# Patient Record
Sex: FEMALE | Race: WHITE | Employment: OTHER
[De-identification: names, ages, dates, MRNs, and addresses within clinical notes are randomized per-mention and may not be internally consistent; named-entity substitution may affect disease eponyms.]

---

## 2017-01-09 DIAGNOSIS — Z12.11 SCREENING FOR COLORECTAL CANCER: ICD-10-CM

## 2017-01-09 DIAGNOSIS — Z12.12 SCREENING FOR COLORECTAL CANCER: ICD-10-CM

## 2017-01-09 LAB
CONTROL: NORMAL
HEMOCCULT STL QL: NEGATIVE

## 2017-01-09 PROCEDURE — 82274 ASSAY TEST FOR BLOOD FECAL: CPT | Performed by: NURSE PRACTITIONER

## 2017-01-16 ENCOUNTER — OFFICE VISIT (OUTPATIENT)
Dept: PRIMARY CARE CLINIC | Facility: CLINIC | Age: 62
End: 2017-01-16

## 2017-01-16 VITALS
BODY MASS INDEX: 38.72 KG/M2 | HEIGHT: 66 IN | SYSTOLIC BLOOD PRESSURE: 121 MMHG | RESPIRATION RATE: 22 BRPM | TEMPERATURE: 97.6 F | DIASTOLIC BLOOD PRESSURE: 78 MMHG | WEIGHT: 240.9 LBS | HEART RATE: 70 BPM

## 2017-01-16 DIAGNOSIS — G47.33 OSA (OBSTRUCTIVE SLEEP APNEA): ICD-10-CM

## 2017-01-16 DIAGNOSIS — E78.5 DYSLIPIDEMIA: ICD-10-CM

## 2017-01-16 DIAGNOSIS — E11.8 CONTROLLED TYPE 2 DIABETES MELLITUS WITH COMPLICATION, WITHOUT LONG-TERM CURRENT USE OF INSULIN (HCC): Primary | ICD-10-CM

## 2017-01-16 DIAGNOSIS — E03.1 CONGENITAL HYPOTHYROIDISM WITHOUT GOITER: ICD-10-CM

## 2017-01-16 PROCEDURE — 99214 OFFICE O/P EST MOD 30 MIN: CPT | Performed by: NURSE PRACTITIONER

## 2017-01-16 RX ORDER — ATORVASTATIN CALCIUM 40 MG/1
40 TABLET, FILM COATED ORAL DAILY
Qty: 90 TABLET | Refills: 3 | Status: SHIPPED | OUTPATIENT
Start: 2017-01-16 | End: 2018-03-20 | Stop reason: SDUPTHER

## 2017-01-16 RX ORDER — SIMVASTATIN 40 MG
TABLET ORAL
Qty: 90 TABLET | Refills: 1 | Status: CANCELLED | OUTPATIENT
Start: 2017-01-16

## 2017-01-16 ASSESSMENT — ENCOUNTER SYMPTOMS
WHEEZING: 0
CONSTIPATION: 0
RHINORRHEA: 0
COUGH: 0
SORE THROAT: 0
SHORTNESS OF BREATH: 0
DIARRHEA: 0
VOMITING: 0
BACK PAIN: 1
ABDOMINAL PAIN: 0
NAUSEA: 0

## 2017-02-08 ENCOUNTER — TELEPHONE (OUTPATIENT)
Dept: PRIMARY CARE CLINIC | Facility: CLINIC | Age: 62
End: 2017-02-08

## 2017-02-08 DIAGNOSIS — E11.8 CONTROLLED TYPE 2 DIABETES MELLITUS WITH COMPLICATION, WITHOUT LONG-TERM CURRENT USE OF INSULIN (HCC): Primary | ICD-10-CM

## 2017-02-08 DIAGNOSIS — M17.11 PRIMARY OSTEOARTHRITIS OF RIGHT KNEE: ICD-10-CM

## 2017-02-08 DIAGNOSIS — G89.29 CHRONIC BILATERAL LOW BACK PAIN WITH RIGHT-SIDED SCIATICA: ICD-10-CM

## 2017-02-08 DIAGNOSIS — M54.41 CHRONIC BILATERAL LOW BACK PAIN WITH RIGHT-SIDED SCIATICA: ICD-10-CM

## 2017-02-08 RX ORDER — MELOXICAM 15 MG/1
15 TABLET ORAL DAILY
Qty: 90 TABLET | Refills: 1 | Status: SHIPPED | OUTPATIENT
Start: 2017-02-08 | End: 2018-03-20 | Stop reason: HOSPADM

## 2017-02-08 RX ORDER — LANCETS 30 GAUGE
1 EACH MISCELLANEOUS DAILY
Qty: 100 EACH | Refills: 3 | Status: SHIPPED | OUTPATIENT
Start: 2017-02-08 | End: 2021-09-01 | Stop reason: SDUPTHER

## 2017-04-12 DIAGNOSIS — E11.8 CONTROLLED TYPE 2 DIABETES MELLITUS WITH COMPLICATION, WITHOUT LONG-TERM CURRENT USE OF INSULIN (HCC): Primary | ICD-10-CM

## 2017-04-12 RX ORDER — LISINOPRIL 2.5 MG/1
2.5 TABLET ORAL DAILY
Qty: 90 TABLET | Refills: 3 | Status: SHIPPED | OUTPATIENT
Start: 2017-04-12 | End: 2018-03-20 | Stop reason: SDUPTHER

## 2017-04-12 RX ORDER — M-VIT,TX,IRON,MINS/CALC/FOLIC 27MG-0.4MG
1 TABLET ORAL DAILY
Qty: 90 TABLET | Refills: 3 | Status: SHIPPED | OUTPATIENT
Start: 2017-04-12 | End: 2018-03-20 | Stop reason: SDUPTHER

## 2017-05-01 ENCOUNTER — HOSPITAL ENCOUNTER (OUTPATIENT)
Age: 62
Discharge: HOME | End: 2017-05-01
Payer: COMMERCIAL

## 2017-05-01 DIAGNOSIS — E78.5 DYSLIPIDEMIA: ICD-10-CM

## 2017-05-01 DIAGNOSIS — E03.1 CONGENITAL HYPOTHYROIDISM WITHOUT GOITER: ICD-10-CM

## 2017-05-01 LAB
ALT SERPL-CCNC: 28 U/L (ref 5–33)
AST SERPL-CCNC: 18 U/L
CHOLESTEROL/HDL RATIO: 5.2
CHOLESTEROL: 171 MG/DL
HDLC SERPL-MCNC: 33 MG/DL
LDL CHOLESTEROL: 117 MG/DL (ref 0–130)
THYROXINE, FREE: 1.37 NG/DL (ref 0.93–1.7)
TRIGL SERPL-MCNC: 107 MG/DL
TSH SERPL DL<=0.05 MIU/L-ACNC: 2.55 MIU/L (ref 0.3–5)
VLDLC SERPL CALC-MCNC: ABNORMAL MG/DL (ref 1–30)

## 2017-05-01 PROCEDURE — 80061 LIPID PANEL: CPT

## 2017-05-01 PROCEDURE — 84460 ALANINE AMINO (ALT) (SGPT): CPT

## 2017-05-01 PROCEDURE — 36415 COLL VENOUS BLD VENIPUNCTURE: CPT

## 2017-05-01 PROCEDURE — 84439 ASSAY OF FREE THYROXINE: CPT

## 2017-05-01 PROCEDURE — 84443 ASSAY THYROID STIM HORMONE: CPT

## 2017-05-01 PROCEDURE — 84450 TRANSFERASE (AST) (SGOT): CPT

## 2017-05-02 ENCOUNTER — TELEPHONE (OUTPATIENT)
Dept: PRIMARY CARE CLINIC | Age: 62
End: 2017-05-02

## 2017-07-12 ENCOUNTER — APPOINTMENT (OUTPATIENT)
Dept: CT IMAGING | Age: 62
End: 2017-07-12
Payer: COMMERCIAL

## 2017-07-12 ENCOUNTER — HOSPITAL ENCOUNTER (EMERGENCY)
Age: 62
Discharge: HOME | End: 2017-07-12
Attending: EMERGENCY MEDICINE
Payer: COMMERCIAL

## 2017-07-12 VITALS
DIASTOLIC BLOOD PRESSURE: 95 MMHG | BODY MASS INDEX: 35.51 KG/M2 | RESPIRATION RATE: 16 BRPM | WEIGHT: 220 LBS | OXYGEN SATURATION: 94 % | SYSTOLIC BLOOD PRESSURE: 152 MMHG | TEMPERATURE: 97.4 F | HEART RATE: 82 BPM

## 2017-07-12 DIAGNOSIS — S16.1XXA NECK STRAIN, INITIAL ENCOUNTER: Primary | ICD-10-CM

## 2017-07-12 DIAGNOSIS — M54.2 NECK PAIN: ICD-10-CM

## 2017-07-12 LAB
ANION GAP SERPL CALCULATED.3IONS-SCNC: 16 MMOL/L (ref 9–17)
BUN BLDV-MCNC: 12 MG/DL (ref 8–23)
BUN/CREAT BLD: 15 (ref 9–20)
CALCIUM SERPL-MCNC: 9.5 MG/DL (ref 8.6–10.4)
CHLORIDE BLD-SCNC: 100 MMOL/L (ref 98–107)
CO2: 24 MMOL/L (ref 20–31)
CREAT SERPL-MCNC: 0.82 MG/DL (ref 0.5–0.9)
GFR AFRICAN AMERICAN: >60 ML/MIN
GFR NON-AFRICAN AMERICAN: >60 ML/MIN
GFR SERPL CREATININE-BSD FRML MDRD: ABNORMAL ML/MIN/{1.73_M2}
GFR SERPL CREATININE-BSD FRML MDRD: ABNORMAL ML/MIN/{1.73_M2}
GLUCOSE BLD-MCNC: 196 MG/DL (ref 70–99)
POTASSIUM SERPL-SCNC: 3.9 MMOL/L (ref 3.7–5.3)
SODIUM BLD-SCNC: 140 MMOL/L (ref 135–144)

## 2017-07-12 PROCEDURE — 70450 CT HEAD/BRAIN W/O DYE: CPT

## 2017-07-12 PROCEDURE — 70498 CT ANGIOGRAPHY NECK: CPT

## 2017-07-12 PROCEDURE — 6360000004 HC RX CONTRAST MEDICATION: Performed by: EMERGENCY MEDICINE

## 2017-07-12 PROCEDURE — 36415 COLL VENOUS BLD VENIPUNCTURE: CPT

## 2017-07-12 PROCEDURE — 6370000000 HC RX 637 (ALT 250 FOR IP): Performed by: EMERGENCY MEDICINE

## 2017-07-12 PROCEDURE — 99284 EMERGENCY DEPT VISIT MOD MDM: CPT

## 2017-07-12 PROCEDURE — 80048 BASIC METABOLIC PNL TOTAL CA: CPT

## 2017-07-12 RX ORDER — HYDROCODONE BITARTRATE AND ACETAMINOPHEN 5; 325 MG/1; MG/1
2 TABLET ORAL ONCE
Status: COMPLETED | OUTPATIENT
Start: 2017-07-12 | End: 2017-07-12

## 2017-07-12 RX ORDER — IBUPROFEN 200 MG
400 TABLET ORAL EVERY 8 HOURS PRN
Qty: 30 TABLET | Refills: 0 | Status: SHIPPED | OUTPATIENT
Start: 2017-07-12 | End: 2019-10-15

## 2017-07-12 RX ORDER — DIAZEPAM 2 MG/1
2 TABLET ORAL EVERY 8 HOURS PRN
Qty: 10 TABLET | Refills: 0 | Status: SHIPPED | OUTPATIENT
Start: 2017-07-12 | End: 2017-07-22

## 2017-07-12 RX ADMIN — HYDROCODONE BITARTRATE AND ACETAMINOPHEN 2 TABLET: 5; 325 TABLET ORAL at 15:46

## 2017-07-12 RX ADMIN — IOPAMIDOL 100 ML: 612 INJECTION, SOLUTION INTRAVENOUS at 16:31

## 2017-07-12 ASSESSMENT — PAIN DESCRIPTION - ORIENTATION
ORIENTATION: POSTERIOR
ORIENTATION: POSTERIOR

## 2017-07-12 ASSESSMENT — PAIN DESCRIPTION - FREQUENCY: FREQUENCY: CONTINUOUS

## 2017-07-12 ASSESSMENT — PAIN DESCRIPTION - LOCATION
LOCATION: NECK
LOCATION: NECK

## 2017-07-12 ASSESSMENT — PAIN SCALES - GENERAL
PAINLEVEL_OUTOF10: 8
PAINLEVEL_OUTOF10: 10
PAINLEVEL_OUTOF10: 10

## 2017-07-12 ASSESSMENT — ENCOUNTER SYMPTOMS: BACK PAIN: 1

## 2017-07-12 ASSESSMENT — PAIN DESCRIPTION - PAIN TYPE
TYPE: ACUTE PAIN
TYPE: ACUTE PAIN

## 2017-07-12 ASSESSMENT — PAIN DESCRIPTION - ONSET: ONSET: SUDDEN

## 2017-07-12 ASSESSMENT — PAIN DESCRIPTION - DESCRIPTORS: DESCRIPTORS: ACHING

## 2018-03-09 ENCOUNTER — HOSPITAL ENCOUNTER (EMERGENCY)
Age: 63
Discharge: HOME | End: 2018-03-09

## 2018-03-09 ENCOUNTER — APPOINTMENT (OUTPATIENT)
Dept: GENERAL RADIOLOGY | Age: 63
End: 2018-03-09

## 2018-03-09 VITALS
BODY MASS INDEX: 36.65 KG/M2 | HEART RATE: 89 BPM | TEMPERATURE: 97.2 F | HEIGHT: 65 IN | WEIGHT: 220 LBS | RESPIRATION RATE: 16 BRPM | SYSTOLIC BLOOD PRESSURE: 163 MMHG | DIASTOLIC BLOOD PRESSURE: 95 MMHG | OXYGEN SATURATION: 96 %

## 2018-03-09 DIAGNOSIS — R05.9 COUGH: Primary | ICD-10-CM

## 2018-03-09 PROCEDURE — 99283 EMERGENCY DEPT VISIT LOW MDM: CPT

## 2018-03-09 PROCEDURE — 6370000000 HC RX 637 (ALT 250 FOR IP)

## 2018-03-09 PROCEDURE — 71046 X-RAY EXAM CHEST 2 VIEWS: CPT

## 2018-03-09 PROCEDURE — 94664 DEMO&/EVAL PT USE INHALER: CPT

## 2018-03-09 RX ORDER — GUAIFENESIN AND CODEINE PHOSPHATE 100; 10 MG/5ML; MG/5ML
5 SOLUTION ORAL 3 TIMES DAILY PRN
Qty: 180 ML | Refills: 0 | Status: SHIPPED | OUTPATIENT
Start: 2018-03-09 | End: 2018-03-16

## 2018-03-09 RX ORDER — DOXYCYCLINE HYCLATE 100 MG
100 TABLET ORAL 2 TIMES DAILY
Qty: 20 TABLET | Refills: 0 | Status: SHIPPED | OUTPATIENT
Start: 2018-03-09 | End: 2018-03-19

## 2018-03-09 RX ORDER — IPRATROPIUM BROMIDE AND ALBUTEROL SULFATE 2.5; .5 MG/3ML; MG/3ML
1 SOLUTION RESPIRATORY (INHALATION)
Status: DISCONTINUED | OUTPATIENT
Start: 2018-03-09 | End: 2018-03-09

## 2018-03-09 RX ORDER — IPRATROPIUM BROMIDE AND ALBUTEROL SULFATE 2.5; .5 MG/3ML; MG/3ML
SOLUTION RESPIRATORY (INHALATION)
Status: COMPLETED
Start: 2018-03-09 | End: 2018-03-09

## 2018-03-09 RX ORDER — ALBUTEROL SULFATE 90 UG/1
2 AEROSOL, METERED RESPIRATORY (INHALATION)
Qty: 1 INHALER | Refills: 0 | Status: SHIPPED | OUTPATIENT
Start: 2018-03-09 | End: 2019-03-26

## 2018-03-09 RX ORDER — IPRATROPIUM BROMIDE AND ALBUTEROL SULFATE 2.5; .5 MG/3ML; MG/3ML
1 SOLUTION RESPIRATORY (INHALATION) ONCE
Status: COMPLETED | OUTPATIENT
Start: 2018-03-09 | End: 2018-03-09

## 2018-03-09 RX ADMIN — IPRATROPIUM BROMIDE AND ALBUTEROL SULFATE 1 AMPULE: 2.5; .5 SOLUTION RESPIRATORY (INHALATION) at 15:40

## 2018-03-09 RX ADMIN — IPRATROPIUM BROMIDE AND ALBUTEROL SULFATE 1 AMPULE: .5; 3 SOLUTION RESPIRATORY (INHALATION) at 15:40

## 2018-03-09 ASSESSMENT — ENCOUNTER SYMPTOMS
EYE REDNESS: 0
EYE DISCHARGE: 0

## 2018-03-09 NOTE — ED PROVIDER NOTES
LANCETS MISC    1 each by Does not apply route daily    LEVOTHYROXINE (SYNTHROID) 175 MCG TABLET    Take 1 tablet by mouth Daily    LISINOPRIL (ZESTRIL) 2.5 MG TABLET    Take 1 tablet by mouth daily    MELOXICAM (MOBIC) 15 MG TABLET    Take 1 tablet by mouth daily    METFORMIN (GLUCOPHAGE) 1000 MG TABLET    Take 1 tablet by mouth 2 times daily (with meals)    MULTIPLE VITAMINS-MINERALS (THERAPEUTIC MULTIVITAMIN-MINERALS) TABLET    Take 1 tablet by mouth daily    VENTOLIN  (90 BASE) MCG/ACT INHALER    INHALE TWO PUFFS BY MOUTH EVERY 6 HOURS AS NEEDED FOR WHEEZING       ALLERGIES     Patient has no known allergies. FAMILY HISTORY       Family History   Problem Relation Age of Onset    Cancer Father     Diabetes Sister     Diabetes Brother     Diabetes Paternal Grandmother         SOCIAL HISTORY       Social History     Social History    Marital status:      Spouse name: N/A    Number of children: N/A    Years of education: N/A     Social History Main Topics    Smoking status: Current Every Day Smoker     Packs/day: 1.00     Years: 40.00    Smokeless tobacco: Never Used    Alcohol use No    Drug use: No    Sexual activity: No     Other Topics Concern    None     Social History Narrative    None       REVIEW OF SYSTEMS       Review of Systems   Constitutional: Negative. HENT: Negative. Eyes: Negative for discharge and redness. Respiratory:        See HPI   Cardiovascular: Negative. Review of systems as in HPI & PMH otherwise negative    PHYSICAL EXAM    (up to 7 for level 4, 8 or more for level 5)     ED Triage Vitals [03/09/18 1446]   BP Temp Temp Source Pulse Resp SpO2 Height Weight   (!) 163/95 97.2 °F (36.2 °C) Tympanic 79 20 93 % 5' 5\" (1.651 m) 220 lb (99.8 kg)       Physical Exam   Constitutional: She is oriented to person, place, and time. She appears well-developed and well-nourished. HENT:   Head: Normocephalic and atraumatic.    Mouth/Throat: Oropharynx is clear administered this visit:    Medications   ipratropium-albuterol (DUONEB) nebulizer solution 1 ampule (1 ampule Inhalation Given 3/9/18 4300)       CONSULTS: (None if blank)  None    Procedures: (None if blank)       CLINICAL IMPRESSION      1. Cough          DISPOSITION/PLAN   DISPOSITION Decision To Discharge 03/09/2018 03:46:03 PM      PATIENT REFERRED TO:  Ioana Johnson, [place] [address]  [phone]    Schedule an appointment as soon as possible for a visit         DISCHARGE MEDICATIONS:  New Prescriptions    ALBUTEROL SULFATE HFA (PROAIR HFA) 108 (90 BASE) MCG/ACT INHALER    Inhale 2 puffs into the lungs every 4-6 hours as needed for Wheezing    DOXYCYCLINE HYCLATE (VIBRA-TABS) 100 MG TABLET    Take 1 tablet by mouth 2 times daily for 10 days    GUAIFENESIN-CODEINE (TUSSI-ORGANIDIN NR) 100-10 MG/5ML SYRUP    Take 5 mLs by mouth 3 times daily as needed for Cough for up to 7 days.               (Please note that portions of this note were completed with a voice recognition program.  Efforts were made to edit the dictations but occasionally words are mis-transcribed.)      Electronically signed by Noemi Rice PA-C on 3/9/18 at 3:15 PM              Noemi Rice PA-C  03/09/18 4171

## 2018-03-20 ENCOUNTER — OFFICE VISIT (OUTPATIENT)
Dept: PRIMARY CARE CLINIC | Age: 63
End: 2018-03-20

## 2018-03-20 VITALS
SYSTOLIC BLOOD PRESSURE: 116 MMHG | HEART RATE: 65 BPM | TEMPERATURE: 97.7 F | WEIGHT: 247.8 LBS | BODY MASS INDEX: 39.82 KG/M2 | HEIGHT: 66 IN | RESPIRATION RATE: 22 BRPM | DIASTOLIC BLOOD PRESSURE: 67 MMHG

## 2018-03-20 DIAGNOSIS — E11.8 CONTROLLED TYPE 2 DIABETES MELLITUS WITH COMPLICATION, WITHOUT LONG-TERM CURRENT USE OF INSULIN (HCC): Primary | ICD-10-CM

## 2018-03-20 DIAGNOSIS — E78.5 DYSLIPIDEMIA: ICD-10-CM

## 2018-03-20 DIAGNOSIS — E03.1 CONGENITAL HYPOTHYROIDISM WITHOUT GOITER: ICD-10-CM

## 2018-03-20 PROCEDURE — 99214 OFFICE O/P EST MOD 30 MIN: CPT | Performed by: NURSE PRACTITIONER

## 2018-03-20 RX ORDER — ATORVASTATIN CALCIUM 40 MG/1
40 TABLET, FILM COATED ORAL DAILY
Qty: 90 TABLET | Refills: 3 | Status: SHIPPED | OUTPATIENT
Start: 2018-03-20 | End: 2019-03-26 | Stop reason: SDUPTHER

## 2018-03-20 RX ORDER — M-VIT,TX,IRON,MINS/CALC/FOLIC 27MG-0.4MG
1 TABLET ORAL DAILY
Qty: 90 TABLET | Refills: 3 | Status: SHIPPED | OUTPATIENT
Start: 2018-03-20 | End: 2018-06-20

## 2018-03-20 RX ORDER — LEVOTHYROXINE SODIUM 175 UG/1
175 TABLET ORAL DAILY
Qty: 90 TABLET | Refills: 3 | Status: SHIPPED | OUTPATIENT
Start: 2018-03-20 | End: 2019-03-26 | Stop reason: SDUPTHER

## 2018-03-20 RX ORDER — LISINOPRIL 2.5 MG/1
2.5 TABLET ORAL DAILY
Qty: 90 TABLET | Refills: 3 | Status: SHIPPED | OUTPATIENT
Start: 2018-03-20 | End: 2019-03-26 | Stop reason: SDUPTHER

## 2018-03-20 ASSESSMENT — PATIENT HEALTH QUESTIONNAIRE - PHQ9
SUM OF ALL RESPONSES TO PHQ QUESTIONS 1-9: 2
2. FEELING DOWN, DEPRESSED OR HOPELESS: 1
SUM OF ALL RESPONSES TO PHQ9 QUESTIONS 1 & 2: 2
1. LITTLE INTEREST OR PLEASURE IN DOING THINGS: 1

## 2018-03-20 ASSESSMENT — ENCOUNTER SYMPTOMS
NAUSEA: 0
SORE THROAT: 1
SINUS PRESSURE: 1
COUGH: 1
EYE REDNESS: 1
SHORTNESS OF BREATH: 0
DIARRHEA: 0
ABDOMINAL PAIN: 0
VOMITING: 0
WHEEZING: 0

## 2018-03-20 NOTE — PROGRESS NOTES
include diabetes mellitus, dyslipidemia, obesity and tobacco exposure. Current diabetic treatment includes oral agent (monotherapy). She is compliant with treatment all of the time. Her weight is stable. She is following a generally unhealthy diet. When asked about meal planning, she reported none. She has had a previous visit with a dietitian. She rarely participates in exercise. There is no change in her home blood glucose trend. An ACE inhibitor/angiotensin II receptor blocker is being taken. She does not see a podiatrist.Eye exam is not current. Past Medical History:     Past Medical History:   Diagnosis Date    COPD (chronic obstructive pulmonary disease) ([facility] [address]     Hyperlipidemia     Hyperthyroidism     Hypothyroidism     Obesity     Osteoarthritis     Sleep apnea     Tobacco abuse     Type II or unspecified type diabetes mellitus without mention of complication, not stated as uncontrolled       Reviewed all health maintenance requirements and ordered appropriate tests  Health Maintenance Due   Topic Date Due    Colon Cancer Screen FIT/FOBT  01/09/2018    A1C test (Diabetic or Prediabetic)  01/13/2018    Diabetic microalbuminuria test  01/13/2018    Diabetic foot exam  01/16/2018       Past Surgical History:     Past Surgical History:   Procedure Laterality Date    HEEL SPUR SURGERY      right heel    TONSILLECTOMY      TOOTH EXTRACTION      All of back teeth    TUBAL LIGATION          Medications:       Prior to Admission medications    Medication Sig Start Date End Date Taking?  Authorizing Provider   lisinopril (ZESTRIL) 2.5 MG tablet Take 1 tablet by mouth daily 3/20/18  Yes Stephanie Johnson CNP   Multiple Vitamins-Minerals (THERAPEUTIC MULTIVITAMIN-MINERALS) tablet Take 1 tablet by mouth daily 3/20/18 3/20/19 Yes Stephanie Johnson CNP   atorvastatin (LIPITOR) 40 MG tablet Take 1 tablet by mouth daily 3/20/18  Yes Stephanie Johnson CNP   levothyroxine (SYNTHROID) 175 MCG tablet Take 1 tablet breath and wheezing. Cardiovascular: Negative for chest pain. Gastrointestinal: Negative for abdominal pain, diarrhea, nausea and vomiting. Endocrine: Positive for polydipsia and polyuria. Musculoskeletal: Negative for myalgias. Skin: Negative for rash. Neurological: Positive for dizziness. Negative for headaches. Physical Exam:   Vitals:  /67   Pulse 65   Temp 97.7 °F (36.5 °C) (Temporal)   Resp 22   Ht 5' 6\" (1.676 m)   Wt 247 lb 12.8 oz (112.4 kg)   BMI 40.00 kg/m²     Physical Exam   Constitutional: She is oriented to person, place, and time. She appears well-developed and well-nourished. No distress. Over nourished   HENT:   Mouth/Throat: Mucous membranes are normal. Mucous membranes are not dry. Posterior oropharyngeal erythema present. Eyes: Right conjunctiva is injected. Left conjunctiva is injected. No scleral icterus. Bilateral lower lid periorbital edema noted   Neck: Normal range of motion. Neck supple. Cardiovascular: Normal rate and regular rhythm. Pulmonary/Chest: Effort normal and breath sounds normal.   Abdominal: Soft. Bowel sounds are normal. She exhibits no distension. There is no tenderness. Obese abdomen   Musculoskeletal: She exhibits no edema. Neurological: She is alert and oriented to person, place, and time. Skin: Skin is warm and dry. No rash noted. Psychiatric: She has a normal mood and affect. Her behavior is normal.   Nursing note and vitals reviewed. Patient is here for diabetic followup. A complete foot exam was done as follows:  Skin: Skin color, texture, turgor normal. No rashes or lesions.   Pulses:  present 2+  Nails:  discoloration: yes  thickening: yes  Monofilament testing:  present  Hair:  absent  Data:     Lab Results   Component Value Date     07/12/2017    K 3.9 07/12/2017     07/12/2017    CO2 24 07/12/2017    BUN 12 07/12/2017    CREATININE 0.82 07/12/2017    GLUCOSE 196 07/12/2017    GLUCOSE 103 05/24/2012    PROT 7.7 01/13/2017    LABALBU 4.3 01/13/2017    LABALBU 4.5 10/27/2011    BILITOT 0.42 01/13/2017    ALKPHOS 95 01/13/2017    AST 18 05/01/2017    ALT 28 05/01/2017     Lab Results   Component Value Date    WBC 8.0 06/20/2016    RBC 4.89 06/20/2016    RBC 4.83 05/24/2012    HGB 14.2 06/20/2016    HCT 42.8 06/20/2016    MCV 87.5 06/20/2016    MCH 29.1 06/20/2016    MCHC 33.2 06/20/2016    RDW 15.0 06/20/2016     06/20/2016     05/24/2012    MPV NOT REPORTED 06/20/2016     Lab Results   Component Value Date    TSH 2.55 05/01/2017     Lab Results   Component Value Date    CHOL 171 05/01/2017    HDL 33 05/01/2017    LABA1C 6.4 01/13/2017       Assessment/Plan:     1. Controlled type 2 diabetes mellitus with complication, without long-term current use of insulin (HCC)  HM DIABETES FOOT EXAM    lisinopril (ZESTRIL) 2.5 MG tablet    Multiple Vitamins-Minerals (THERAPEUTIC MULTIVITAMIN-MINERALS) tablet    metFORMIN (GLUCOPHAGE) 1000 MG tablet    CBC Auto Differential    Comprehensive Metabolic Panel    Hemoglobin A1C    Microalbumin, Ur   2. Dyslipidemia  atorvastatin (LIPITOR) 40 MG tablet    Lipid Panel   3. Congenital hypothyroidism without goiter  levothyroxine (SYNTHROID) 175 MCG tablet    TSH without Reflex    T4, Free    T4, Free    TSH without Reflex       1. Demetrius Thomson received counseling on the following healthy behaviors: nutrition, exercise and medication adherence  2. Patient given educational materials - see patient instructions  3. Was a self-tracking handout given in paper form or via [employer]? No  If yes, see orders or list here. 4.  Discussed use, benefit, and side effects of prescribed medications. Barriers to medication compliance addressed. All patient questions answered. Pt voiced understanding. 5.  Reviewed prior labs and health maintenance  6. Continue current medications, diet and exercise.     Completed Refills   Requested Prescriptions     Signed Prescriptions Disp Refills    lisinopril (ZESTRIL) 2.5 MG tablet 90 tablet 3     Sig: Take 1 tablet by mouth daily    Multiple Vitamins-Minerals (THERAPEUTIC MULTIVITAMIN-MINERALS) tablet 90 tablet 3     Sig: Take 1 tablet by mouth daily    atorvastatin (LIPITOR) 40 MG tablet 90 tablet 3     Sig: Take 1 tablet by mouth daily    levothyroxine (SYNTHROID) 175 MCG tablet 90 tablet 3     Sig: Take 1 tablet by mouth Daily    metFORMIN (GLUCOPHAGE) 1000 MG tablet 180 tablet 3     Sig: Take 1 tablet by mouth 2 times daily (with meals)         Return in about 3 months (around 6/20/2018) for check up-a1c in office.

## 2018-03-21 ENCOUNTER — TELEPHONE (OUTPATIENT)
Dept: PRIMARY CARE CLINIC | Age: 63
End: 2018-03-21

## 2018-03-21 ENCOUNTER — HOSPITAL ENCOUNTER (OUTPATIENT)
Age: 63
Discharge: HOME | End: 2018-03-21

## 2018-03-21 DIAGNOSIS — E11.8 CONTROLLED TYPE 2 DIABETES MELLITUS WITH COMPLICATION, WITHOUT LONG-TERM CURRENT USE OF INSULIN (HCC): ICD-10-CM

## 2018-03-21 DIAGNOSIS — E78.5 DYSLIPIDEMIA: ICD-10-CM

## 2018-03-21 DIAGNOSIS — E03.1 CONGENITAL HYPOTHYROIDISM WITHOUT GOITER: Primary | ICD-10-CM

## 2018-03-21 DIAGNOSIS — E03.1 CONGENITAL HYPOTHYROIDISM WITHOUT GOITER: ICD-10-CM

## 2018-03-21 LAB
ABSOLUTE EOS #: 0.37 K/UL (ref 0–0.44)
ABSOLUTE IMMATURE GRANULOCYTE: 0.03 K/UL (ref 0–0.3)
ABSOLUTE LYMPH #: 2.16 K/UL (ref 1.1–3.7)
ABSOLUTE MONO #: 0.48 K/UL (ref 0.1–1.2)
ALBUMIN SERPL-MCNC: 4.3 G/DL (ref 3.5–5.2)
ALBUMIN/GLOBULIN RATIO: 1.4 (ref 1–2.5)
ALP BLD-CCNC: 64 U/L (ref 35–104)
ALT SERPL-CCNC: 22 U/L (ref 5–33)
ANION GAP SERPL CALCULATED.3IONS-SCNC: 10 MMOL/L (ref 9–17)
AST SERPL-CCNC: 36 U/L
BASOPHILS # BLD: 1 % (ref 0–2)
BASOPHILS ABSOLUTE: 0.08 K/UL (ref 0–0.2)
BILIRUB SERPL-MCNC: 0.32 MG/DL (ref 0.3–1.2)
BUN BLDV-MCNC: 9 MG/DL (ref 8–23)
BUN/CREAT BLD: 10 (ref 9–20)
CALCIUM SERPL-MCNC: 10.1 MG/DL (ref 8.6–10.4)
CHLORIDE BLD-SCNC: 92 MMOL/L (ref 98–107)
CHOLESTEROL/HDL RATIO: 7.2
CHOLESTEROL: 402 MG/DL
CO2: 36 MMOL/L (ref 20–31)
CREAT SERPL-MCNC: 0.91 MG/DL (ref 0.5–0.9)
CREATININE URINE: 33 MG/DL (ref 28–217)
DIFFERENTIAL TYPE: ABNORMAL
EOSINOPHILS RELATIVE PERCENT: 5 % (ref 1–4)
ESTIMATED AVERAGE GLUCOSE: 137 MG/DL
GFR AFRICAN AMERICAN: >60 ML/MIN
GFR NON-AFRICAN AMERICAN: >60 ML/MIN
GFR SERPL CREATININE-BSD FRML MDRD: ABNORMAL ML/MIN/{1.73_M2}
GFR SERPL CREATININE-BSD FRML MDRD: ABNORMAL ML/MIN/{1.73_M2}
GLUCOSE BLD-MCNC: 111 MG/DL (ref 70–99)
HBA1C MFR BLD: 6.4 % (ref 4.8–5.9)
HCT VFR BLD CALC: 44.5 % (ref 36.3–47.1)
HDLC SERPL-MCNC: 56 MG/DL
HEMOGLOBIN: 14.4 G/DL (ref 11.9–15.1)
IMMATURE GRANULOCYTES: 0 %
LDL CHOLESTEROL: 294 MG/DL (ref 0–130)
LYMPHOCYTES # BLD: 29 % (ref 24–43)
MCH RBC QN AUTO: 30.7 PG (ref 25.2–33.5)
MCHC RBC AUTO-ENTMCNC: 32.4 G/DL (ref 28.4–34.8)
MCV RBC AUTO: 94.9 FL (ref 82.6–102.9)
MICROALBUMIN/CREAT 24H UR: 15 MG/L
MICROALBUMIN/CREAT UR-RTO: 45 MCG/MG CREAT
MONOCYTES # BLD: 7 % (ref 3–12)
NRBC AUTOMATED: 0 PER 100 WBC
PDW BLD-RTO: 13.5 % (ref 11.8–14.4)
PLATELET # BLD: 238 K/UL (ref 138–453)
PLATELET ESTIMATE: ABNORMAL
PMV BLD AUTO: 10 FL (ref 8.1–13.5)
POTASSIUM SERPL-SCNC: 4.3 MMOL/L (ref 3.7–5.3)
RBC # BLD: 4.69 M/UL (ref 3.95–5.11)
RBC # BLD: ABNORMAL 10*6/UL
SEG NEUTROPHILS: 58 % (ref 36–65)
SEGMENTED NEUTROPHILS ABSOLUTE COUNT: 4.22 K/UL (ref 1.5–8.1)
SODIUM BLD-SCNC: 138 MMOL/L (ref 135–144)
THYROXINE, FREE: 0.38 NG/DL (ref 0.93–1.7)
TOTAL PROTEIN: 7.4 G/DL (ref 6.4–8.3)
TRIGL SERPL-MCNC: 259 MG/DL
TSH SERPL DL<=0.05 MIU/L-ACNC: 58.28 MIU/L (ref 0.3–5)
VLDLC SERPL CALC-MCNC: ABNORMAL MG/DL (ref 1–30)
WBC # BLD: 7.3 K/UL (ref 3.5–11.3)
WBC # BLD: ABNORMAL 10*3/UL

## 2018-03-21 PROCEDURE — 80053 COMPREHEN METABOLIC PANEL: CPT

## 2018-03-21 PROCEDURE — 84443 ASSAY THYROID STIM HORMONE: CPT

## 2018-03-21 PROCEDURE — 85025 COMPLETE CBC W/AUTO DIFF WBC: CPT

## 2018-03-21 PROCEDURE — 84439 ASSAY OF FREE THYROXINE: CPT

## 2018-03-21 PROCEDURE — 82043 UR ALBUMIN QUANTITATIVE: CPT

## 2018-03-21 PROCEDURE — 36415 COLL VENOUS BLD VENIPUNCTURE: CPT

## 2018-03-21 PROCEDURE — 83036 HEMOGLOBIN GLYCOSYLATED A1C: CPT

## 2018-03-21 PROCEDURE — 82570 ASSAY OF URINE CREATININE: CPT

## 2018-03-21 PROCEDURE — 80061 LIPID PANEL: CPT

## 2018-03-21 NOTE — TELEPHONE ENCOUNTER
----- Message from [address] [place] [address], CNP sent at 3/21/2018 11:01 AM EDT -----  Restart all medications and recheck thyroid labs in 2 months.

## 2018-06-16 ENCOUNTER — HOSPITAL ENCOUNTER (OUTPATIENT)
Age: 63
Discharge: HOME | End: 2018-06-16

## 2018-06-16 DIAGNOSIS — E03.1 CONGENITAL HYPOTHYROIDISM WITHOUT GOITER: ICD-10-CM

## 2018-06-16 LAB
THYROXINE, FREE: 1.66 NG/DL (ref 0.93–1.7)
TSH SERPL DL<=0.05 MIU/L-ACNC: 0.04 MIU/L (ref 0.3–5)

## 2018-06-16 PROCEDURE — 84443 ASSAY THYROID STIM HORMONE: CPT

## 2018-06-16 PROCEDURE — 36415 COLL VENOUS BLD VENIPUNCTURE: CPT

## 2018-06-16 PROCEDURE — 84439 ASSAY OF FREE THYROXINE: CPT

## 2018-06-20 ENCOUNTER — TELEPHONE (OUTPATIENT)
Dept: PRIMARY CARE CLINIC | Age: 63
End: 2018-06-20

## 2018-06-20 ENCOUNTER — OFFICE VISIT (OUTPATIENT)
Dept: PRIMARY CARE CLINIC | Age: 63
End: 2018-06-20

## 2018-06-20 VITALS
HEIGHT: 66 IN | WEIGHT: 224.1 LBS | SYSTOLIC BLOOD PRESSURE: 128 MMHG | DIASTOLIC BLOOD PRESSURE: 66 MMHG | TEMPERATURE: 98.6 F | HEART RATE: 83 BPM | RESPIRATION RATE: 20 BRPM | BODY MASS INDEX: 36.02 KG/M2

## 2018-06-20 DIAGNOSIS — E03.1 CONGENITAL HYPOTHYROIDISM WITHOUT GOITER: ICD-10-CM

## 2018-06-20 DIAGNOSIS — E78.5 DYSLIPIDEMIA: ICD-10-CM

## 2018-06-20 DIAGNOSIS — Z12.11 SCREENING FOR COLORECTAL CANCER: Primary | ICD-10-CM

## 2018-06-20 DIAGNOSIS — Z12.12 SCREENING FOR COLORECTAL CANCER: Primary | ICD-10-CM

## 2018-06-20 DIAGNOSIS — Z12.31 ENCOUNTER FOR SCREENING MAMMOGRAM FOR BREAST CANCER: ICD-10-CM

## 2018-06-20 DIAGNOSIS — E11.8 CONTROLLED TYPE 2 DIABETES MELLITUS WITH COMPLICATION, WITHOUT LONG-TERM CURRENT USE OF INSULIN (HCC): Primary | ICD-10-CM

## 2018-06-20 DIAGNOSIS — R01.1 NEWLY RECOGNIZED HEART MURMUR: ICD-10-CM

## 2018-06-20 PROCEDURE — 99214 OFFICE O/P EST MOD 30 MIN: CPT | Performed by: NURSE PRACTITIONER

## 2018-06-20 ASSESSMENT — ENCOUNTER SYMPTOMS
NAUSEA: 0
ABDOMINAL PAIN: 0
CONSTIPATION: 0
COUGH: 0
DIARRHEA: 0
VOMITING: 0
RHINORRHEA: 0
SHORTNESS OF BREATH: 0
SORE THROAT: 0
WHEEZING: 0

## 2018-06-27 ENCOUNTER — TELEPHONE (OUTPATIENT)
Dept: PRIMARY CARE CLINIC | Age: 63
End: 2018-06-27

## 2018-06-27 DIAGNOSIS — Z12.11 SCREENING FOR COLORECTAL CANCER: ICD-10-CM

## 2018-06-27 DIAGNOSIS — Z12.12 SCREENING FOR COLORECTAL CANCER: ICD-10-CM

## 2018-06-27 DIAGNOSIS — E03.1 CONGENITAL HYPOTHYROIDISM WITHOUT GOITER: ICD-10-CM

## 2018-06-27 LAB
CONTROL: PRESENT
HEMOCCULT STL QL: NORMAL

## 2018-06-27 PROCEDURE — 82274 ASSAY TEST FOR BLOOD FECAL: CPT | Performed by: NURSE PRACTITIONER

## 2018-07-16 ENCOUNTER — HOSPITAL ENCOUNTER (OUTPATIENT)
Dept: NON INVASIVE DIAGNOSTICS | Age: 63
Discharge: HOME | End: 2018-07-16

## 2018-07-16 ENCOUNTER — HOSPITAL ENCOUNTER (OUTPATIENT)
Dept: WOMENS IMAGING | Age: 63
Discharge: HOME | End: 2018-07-18

## 2018-07-16 DIAGNOSIS — R01.1 NEWLY RECOGNIZED HEART MURMUR: ICD-10-CM

## 2018-07-16 DIAGNOSIS — Z12.31 ENCOUNTER FOR SCREENING MAMMOGRAM FOR BREAST CANCER: ICD-10-CM

## 2018-07-16 LAB
LV EF: 60 %
LVEF MODALITY: NORMAL

## 2018-07-16 PROCEDURE — 77067 SCR MAMMO BI INCL CAD: CPT

## 2018-07-16 PROCEDURE — 93306 TTE W/DOPPLER COMPLETE: CPT

## 2018-07-17 ENCOUNTER — TELEPHONE (OUTPATIENT)
Dept: PRIMARY CARE CLINIC | Age: 63
End: 2018-07-17

## 2018-07-17 NOTE — TELEPHONE ENCOUNTER
----- Message from [address] [place] [address], CYRUS - CNP sent at 7/16/2018  6:01 PM EDT -----  Results are normal, please call patient and make them aware.

## 2018-07-18 ENCOUNTER — TELEPHONE (OUTPATIENT)
Dept: PRIMARY CARE CLINIC | Age: 63
End: 2018-07-18

## 2018-08-03 ENCOUNTER — HOSPITAL ENCOUNTER (OUTPATIENT)
Age: 63
Discharge: HOME | End: 2018-08-03

## 2018-09-13 ENCOUNTER — HOSPITAL ENCOUNTER (OUTPATIENT)
Age: 63
Discharge: HOME | End: 2018-09-13

## 2018-09-13 DIAGNOSIS — E11.8 CONTROLLED TYPE 2 DIABETES MELLITUS WITH COMPLICATION, WITHOUT LONG-TERM CURRENT USE OF INSULIN (HCC): ICD-10-CM

## 2018-09-13 DIAGNOSIS — E03.1 CONGENITAL HYPOTHYROIDISM WITHOUT GOITER: ICD-10-CM

## 2018-09-13 LAB
ALBUMIN SERPL-MCNC: 3.9 G/DL (ref 3.5–5.2)
ALBUMIN/GLOBULIN RATIO: 1 (ref 1–2.5)
ALP BLD-CCNC: 127 U/L (ref 35–104)
ALT SERPL-CCNC: 17 U/L (ref 5–33)
ANION GAP SERPL CALCULATED.3IONS-SCNC: 13 MMOL/L (ref 9–17)
AST SERPL-CCNC: 15 U/L
BILIRUB SERPL-MCNC: 0.36 MG/DL (ref 0.3–1.2)
BUN BLDV-MCNC: 9 MG/DL (ref 8–23)
BUN/CREAT BLD: 14 (ref 9–20)
CALCIUM SERPL-MCNC: 10 MG/DL (ref 8.6–10.4)
CHLORIDE BLD-SCNC: 102 MMOL/L (ref 98–107)
CHOLESTEROL/HDL RATIO: 7.9
CHOLESTEROL: 254 MG/DL
CO2: 27 MMOL/L (ref 20–31)
CREAT SERPL-MCNC: 0.63 MG/DL (ref 0.5–0.9)
CREATININE URINE: 102.8 MG/DL (ref 28–217)
ESTIMATED AVERAGE GLUCOSE: 134 MG/DL
GFR AFRICAN AMERICAN: >60 ML/MIN
GFR NON-AFRICAN AMERICAN: >60 ML/MIN
GFR SERPL CREATININE-BSD FRML MDRD: ABNORMAL ML/MIN/{1.73_M2}
GFR SERPL CREATININE-BSD FRML MDRD: ABNORMAL ML/MIN/{1.73_M2}
GLUCOSE BLD-MCNC: 127 MG/DL (ref 70–99)
HBA1C MFR BLD: 6.3 % (ref 4.8–5.9)
HDLC SERPL-MCNC: 32 MG/DL
LDL CHOLESTEROL: 177 MG/DL (ref 0–130)
MICROALBUMIN/CREAT 24H UR: 14 MG/L
MICROALBUMIN/CREAT UR-RTO: 14 MCG/MG CREAT
POTASSIUM SERPL-SCNC: 4.3 MMOL/L (ref 3.7–5.3)
SODIUM BLD-SCNC: 142 MMOL/L (ref 135–144)
THYROXINE, FREE: 1.51 NG/DL (ref 0.93–1.7)
TOTAL PROTEIN: 7.7 G/DL (ref 6.4–8.3)
TRIGL SERPL-MCNC: 226 MG/DL
TSH SERPL DL<=0.05 MIU/L-ACNC: 0.3 MIU/L (ref 0.3–5)
VLDLC SERPL CALC-MCNC: ABNORMAL MG/DL (ref 1–30)

## 2018-09-13 PROCEDURE — 84439 ASSAY OF FREE THYROXINE: CPT

## 2018-09-13 PROCEDURE — 82043 UR ALBUMIN QUANTITATIVE: CPT

## 2018-09-13 PROCEDURE — 84443 ASSAY THYROID STIM HORMONE: CPT

## 2018-09-13 PROCEDURE — 36415 COLL VENOUS BLD VENIPUNCTURE: CPT

## 2018-09-13 PROCEDURE — 80053 COMPREHEN METABOLIC PANEL: CPT

## 2018-09-13 PROCEDURE — 82570 ASSAY OF URINE CREATININE: CPT

## 2018-09-13 PROCEDURE — 80061 LIPID PANEL: CPT

## 2018-09-13 PROCEDURE — 83036 HEMOGLOBIN GLYCOSYLATED A1C: CPT

## 2018-09-24 ENCOUNTER — OFFICE VISIT (OUTPATIENT)
Dept: PRIMARY CARE CLINIC | Age: 63
End: 2018-09-24

## 2018-09-24 VITALS
HEART RATE: 85 BPM | BODY MASS INDEX: 35.62 KG/M2 | RESPIRATION RATE: 20 BRPM | HEIGHT: 66 IN | TEMPERATURE: 98.4 F | WEIGHT: 221.6 LBS | SYSTOLIC BLOOD PRESSURE: 125 MMHG | DIASTOLIC BLOOD PRESSURE: 66 MMHG

## 2018-09-24 DIAGNOSIS — E11.8 CONTROLLED TYPE 2 DIABETES MELLITUS WITH COMPLICATION, WITHOUT LONG-TERM CURRENT USE OF INSULIN (HCC): Primary | ICD-10-CM

## 2018-09-24 DIAGNOSIS — E03.1 CONGENITAL HYPOTHYROIDISM WITHOUT GOITER: ICD-10-CM

## 2018-09-24 DIAGNOSIS — E78.5 DYSLIPIDEMIA: ICD-10-CM

## 2018-09-24 PROCEDURE — 99214 OFFICE O/P EST MOD 30 MIN: CPT | Performed by: NURSE PRACTITIONER

## 2018-09-24 ASSESSMENT — ENCOUNTER SYMPTOMS
DIARRHEA: 0
VOMITING: 0
ABDOMINAL PAIN: 0
SORE THROAT: 0
WHEEZING: 0
CONSTIPATION: 0
RHINORRHEA: 0
COUGH: 0
SHORTNESS OF BREATH: 0
NAUSEA: 0

## 2018-09-24 NOTE — PROGRESS NOTES
Name: Marina Kelley  : 1955         Chief Complaint:     Chief Complaint   Patient presents with    Diabetes     Routine office visit. Statyes her blood sugar today was 140.  Hyperlipidemia    Hypertension    Thyroid Problem       History of Present Illness:      Marina Kelley is a 58 y.o.  female who presents with Diabetes (Routine office visit. Statyes her blood sugar today was 140. ); Hyperlipidemia; Hypertension; and Thyroid Problem      Hilda Shahid is here today for a routine office visit. HypothyroidStable, patient continues to take levothyroxin as directed on an empty stomach first thing in the morning with water. Labs within normal limits. Diabetes   She presents for her follow-up diabetic visit. She has type 2 diabetes mellitus. Her disease course has been stable. There are no hypoglycemic associated symptoms. Pertinent negatives for hypoglycemia include no dizziness or headaches. Associated symptoms include foot paresthesias. Pertinent negatives for diabetes include no chest pain, no fatigue, no polydipsia, no polyphagia and no polyuria. There are no hypoglycemic complications. Symptoms are stable. Risk factors for coronary artery disease include diabetes mellitus, dyslipidemia, hypertension, obesity, post-menopausal, sedentary lifestyle and tobacco exposure. Current diabetic treatment includes oral agent (monotherapy). She is compliant with treatment most of the time. Her weight is stable. She is following a generally healthy diet. When asked about meal planning, she reported none. She has had a previous visit with a dietitian. She rarely participates in exercise. There is no change in her home blood glucose trend. An ACE inhibitor/angiotensin II receptor blocker is being taken. She does not see a podiatrist.Eye exam is not current. Hyperlipidemia   This is a chronic problem. The current episode started more than 1 year ago. The problem is uncontrolled.  Recent lipid tests were reviewed and are high (NOT TAKING MEDICATION). Exacerbating diseases include diabetes and obesity. Factors aggravating her hyperlipidemia include smoking. Pertinent negatives include no chest pain, myalgias or shortness of breath. Current antihyperlipidemic treatment includes statins. The current treatment provides mild improvement of lipids. Compliance problems include adherence to diet and adherence to exercise. Risk factors for coronary artery disease include dyslipidemia, diabetes mellitus, hypertension, obesity, post-menopausal and a sedentary lifestyle. Past Medical History:     Past Medical History:   Diagnosis Date    COPD (chronic obstructive pulmonary disease) ([facility] [address]     Hyperlipidemia     Hyperthyroidism     Hypothyroidism     Obesity     Osteoarthritis     Sleep apnea     Tobacco abuse     Type II or unspecified type diabetes mellitus without mention of complication, not stated as uncontrolled       Reviewed all health maintenance requirements and ordered appropriate tests  Health Maintenance Due   Topic Date Due    Diabetic foot exam  01/16/2018       Past Surgical History:     Past Surgical History:   Procedure Laterality Date    HEEL SPUR SURGERY      right heel    TONSILLECTOMY      TOOTH EXTRACTION      All of back teeth    TUBAL LIGATION          Medications:       Prior to Admission medications    Medication Sig Start Date End Date Taking?  Authorizing Provider   lisinopril (ZESTRIL) 2.5 MG tablet Take 1 tablet by mouth daily 3/20/18  Yes Yvetta Due Might, APRN - CNP   atorvastatin (LIPITOR) 40 MG tablet Take 1 tablet by mouth daily 3/20/18  Yes Yvetta Due Might, APRN - CNP   levothyroxine (SYNTHROID) 175 MCG tablet Take 1 tablet by mouth Daily 3/20/18  Yes Yvetta Due Might, APRN - CNP   metFORMIN (GLUCOPHAGE) 1000 MG tablet Take 1 tablet by mouth 2 times daily (with meals) 3/20/18  Yes Yvetta Due Might, APRN - CNP   albuterol sulfate HFA (PROAIR HFA) 108 (90 Base) MCG/ACT inhaler Inhale Negative for gait problem and myalgias. Skin: Negative for rash. Neurological: Negative for dizziness, syncope and headaches. Physical Exam:   Vitals:  /66 (Site: Left Upper Arm, Position: Sitting, Cuff Size: Large Adult)   Pulse 85   Temp 98.4 °F (36.9 °C) (Temporal)   Resp 20   Ht 5' 6\" (1.676 m)   Wt 221 lb 9.6 oz (100.5 kg)   BMI 35.77 kg/m²     Physical Exam   Constitutional: She is oriented to person, place, and time. She appears well-developed and well-nourished. No distress. HENT:   Nose: Mucosal edema present. No rhinorrhea. Mouth/Throat: Mucous membranes are normal. Mucous membranes are not dry. Posterior oropharyngeal erythema present. Eyes: Conjunctivae are normal. No scleral icterus. Neck: Normal range of motion. Neck supple. No thyromegaly present. Cardiovascular: Normal rate and regular rhythm. Murmur heard. Pulmonary/Chest: Effort normal and breath sounds normal.   Abdominal: Soft. Bowel sounds are normal. She exhibits no distension. There is no tenderness. Musculoskeletal: She exhibits no edema. Lymphadenopathy:     She has no cervical adenopathy. Neurological: She is alert and oriented to person, place, and time. Skin: Skin is warm and dry. No rash noted. Psychiatric: She has a normal mood and affect. Her behavior is normal.   Nursing note and vitals reviewed. Patient is here for diabetic followup. A complete foot exam was done as follows:  Skin: Skin color, texture, turgor normal. No rashes or lesions.   Pulses:  present 2+  Nails:  discoloration: yes  thickening: yes  Monofilament testing:  partially present  Hair:  absent    Data:     Lab Results   Component Value Date     09/13/2018    K 4.3 09/13/2018     09/13/2018    CO2 27 09/13/2018    BUN 9 09/13/2018    CREATININE 0.63 09/13/2018    GLUCOSE 127 09/13/2018    GLUCOSE 103 05/24/2012    PROT 7.7 09/13/2018    LABALBU 3.9 09/13/2018    LABALBU 4.5 10/27/2011    BILITOT 0.36 09/13/2018

## 2019-01-08 ENCOUNTER — TELEPHONE (OUTPATIENT)
Dept: PRIMARY CARE CLINIC | Age: 64
End: 2019-01-08

## 2019-03-23 ENCOUNTER — HOSPITAL ENCOUNTER (OUTPATIENT)
Age: 64
Discharge: HOME | End: 2019-03-23
Payer: MEDICARE

## 2019-03-23 DIAGNOSIS — E11.8 CONTROLLED TYPE 2 DIABETES MELLITUS WITH COMPLICATION, WITHOUT LONG-TERM CURRENT USE OF INSULIN (HCC): ICD-10-CM

## 2019-03-23 DIAGNOSIS — E78.5 DYSLIPIDEMIA: ICD-10-CM

## 2019-03-23 LAB
ABSOLUTE EOS #: 0.32 K/UL (ref 0–0.44)
ABSOLUTE IMMATURE GRANULOCYTE: 0.04 K/UL (ref 0–0.3)
ABSOLUTE LYMPH #: 3.15 K/UL (ref 1.1–3.7)
ABSOLUTE MONO #: 0.73 K/UL (ref 0.1–1.2)
ALBUMIN SERPL-MCNC: 3.7 G/DL (ref 3.5–5.2)
ALBUMIN/GLOBULIN RATIO: 1 (ref 1–2.5)
ALP BLD-CCNC: 147 U/L (ref 35–104)
ALT SERPL-CCNC: 21 U/L (ref 5–33)
ANION GAP SERPL CALCULATED.3IONS-SCNC: 11 MMOL/L (ref 9–17)
AST SERPL-CCNC: 16 U/L
BASOPHILS # BLD: 1 % (ref 0–2)
BASOPHILS ABSOLUTE: 0.09 K/UL (ref 0–0.2)
BILIRUB SERPL-MCNC: 0.34 MG/DL (ref 0.3–1.2)
BUN BLDV-MCNC: 10 MG/DL (ref 8–23)
BUN/CREAT BLD: 16 (ref 9–20)
CALCIUM SERPL-MCNC: 10.1 MG/DL (ref 8.6–10.4)
CHLORIDE BLD-SCNC: 99 MMOL/L (ref 98–107)
CHOLESTEROL/HDL RATIO: 6.2
CHOLESTEROL: 230 MG/DL
CO2: 28 MMOL/L (ref 20–31)
CREAT SERPL-MCNC: 0.64 MG/DL (ref 0.5–0.9)
CREATININE URINE: 97.9 MG/DL (ref 28–217)
DIFFERENTIAL TYPE: ABNORMAL
EOSINOPHILS RELATIVE PERCENT: 4 % (ref 1–4)
ESTIMATED AVERAGE GLUCOSE: 148 MG/DL
GFR AFRICAN AMERICAN: >60 ML/MIN
GFR NON-AFRICAN AMERICAN: >60 ML/MIN
GFR SERPL CREATININE-BSD FRML MDRD: ABNORMAL ML/MIN/{1.73_M2}
GFR SERPL CREATININE-BSD FRML MDRD: ABNORMAL ML/MIN/{1.73_M2}
GLUCOSE BLD-MCNC: 161 MG/DL (ref 70–99)
HBA1C MFR BLD: 6.8 % (ref 4.8–5.9)
HCT VFR BLD CALC: 48.4 % (ref 36.3–47.1)
HDLC SERPL-MCNC: 37 MG/DL
HEMOGLOBIN: 15.8 G/DL (ref 11.9–15.1)
IMMATURE GRANULOCYTES: 0 %
LDL CHOLESTEROL: 163 MG/DL (ref 0–130)
LYMPHOCYTES # BLD: 35 % (ref 24–43)
MCH RBC QN AUTO: 28.9 PG (ref 25.2–33.5)
MCHC RBC AUTO-ENTMCNC: 32.6 G/DL (ref 28.4–34.8)
MCV RBC AUTO: 88.6 FL (ref 82.6–102.9)
MICROALBUMIN/CREAT 24H UR: <12 MG/L
MICROALBUMIN/CREAT UR-RTO: NORMAL MCG/MG CREAT
MONOCYTES # BLD: 8 % (ref 3–12)
NRBC AUTOMATED: 0 PER 100 WBC
PDW BLD-RTO: 12.7 % (ref 11.8–14.4)
PLATELET # BLD: 299 K/UL (ref 138–453)
PLATELET ESTIMATE: ABNORMAL
PMV BLD AUTO: 9.5 FL (ref 8.1–13.5)
POTASSIUM SERPL-SCNC: 4.2 MMOL/L (ref 3.7–5.3)
RBC # BLD: 5.46 M/UL (ref 3.95–5.11)
RBC # BLD: ABNORMAL 10*6/UL
SEG NEUTROPHILS: 52 % (ref 36–65)
SEGMENTED NEUTROPHILS ABSOLUTE COUNT: 4.66 K/UL (ref 1.5–8.1)
SODIUM BLD-SCNC: 138 MMOL/L (ref 135–144)
TOTAL PROTEIN: 7.4 G/DL (ref 6.4–8.3)
TRIGL SERPL-MCNC: 151 MG/DL
VLDLC SERPL CALC-MCNC: ABNORMAL MG/DL (ref 1–30)
WBC # BLD: 9 K/UL (ref 3.5–11.3)
WBC # BLD: ABNORMAL 10*3/UL

## 2019-03-23 PROCEDURE — 85025 COMPLETE CBC W/AUTO DIFF WBC: CPT

## 2019-03-23 PROCEDURE — 83036 HEMOGLOBIN GLYCOSYLATED A1C: CPT

## 2019-03-23 PROCEDURE — 80061 LIPID PANEL: CPT

## 2019-03-23 PROCEDURE — 82043 UR ALBUMIN QUANTITATIVE: CPT

## 2019-03-23 PROCEDURE — 82570 ASSAY OF URINE CREATININE: CPT

## 2019-03-23 PROCEDURE — 80053 COMPREHEN METABOLIC PANEL: CPT

## 2019-03-23 PROCEDURE — 36415 COLL VENOUS BLD VENIPUNCTURE: CPT

## 2019-03-26 ENCOUNTER — OFFICE VISIT (OUTPATIENT)
Dept: PRIMARY CARE CLINIC | Age: 64
End: 2019-03-26
Payer: MEDICARE

## 2019-03-26 VITALS
WEIGHT: 230 LBS | RESPIRATION RATE: 20 BRPM | BODY MASS INDEX: 36.96 KG/M2 | HEART RATE: 77 BPM | TEMPERATURE: 98 F | DIASTOLIC BLOOD PRESSURE: 71 MMHG | SYSTOLIC BLOOD PRESSURE: 124 MMHG | HEIGHT: 66 IN

## 2019-03-26 DIAGNOSIS — Z87.891 PERSONAL HISTORY OF TOBACCO USE: ICD-10-CM

## 2019-03-26 DIAGNOSIS — E11.8 CONTROLLED TYPE 2 DIABETES MELLITUS WITH COMPLICATION, WITHOUT LONG-TERM CURRENT USE OF INSULIN (HCC): Primary | ICD-10-CM

## 2019-03-26 DIAGNOSIS — E78.5 DYSLIPIDEMIA: ICD-10-CM

## 2019-03-26 DIAGNOSIS — E03.1 CONGENITAL HYPOTHYROIDISM WITHOUT GOITER: ICD-10-CM

## 2019-03-26 PROCEDURE — G8484 FLU IMMUNIZE NO ADMIN: HCPCS | Performed by: NURSE PRACTITIONER

## 2019-03-26 PROCEDURE — 1090F PRES/ABSN URINE INCON ASSESS: CPT | Performed by: NURSE PRACTITIONER

## 2019-03-26 PROCEDURE — G0296 VISIT TO DETERM LDCT ELIG: HCPCS | Performed by: NURSE PRACTITIONER

## 2019-03-26 PROCEDURE — 4004F PT TOBACCO SCREEN RCVD TLK: CPT | Performed by: NURSE PRACTITIONER

## 2019-03-26 PROCEDURE — G8417 CALC BMI ABV UP PARAM F/U: HCPCS | Performed by: NURSE PRACTITIONER

## 2019-03-26 PROCEDURE — 99214 OFFICE O/P EST MOD 30 MIN: CPT | Performed by: NURSE PRACTITIONER

## 2019-03-26 PROCEDURE — G8427 DOCREV CUR MEDS BY ELIG CLIN: HCPCS | Performed by: NURSE PRACTITIONER

## 2019-03-26 PROCEDURE — 3017F COLORECTAL CA SCREEN DOC REV: CPT | Performed by: NURSE PRACTITIONER

## 2019-03-26 PROCEDURE — 1123F ACP DISCUSS/DSCN MKR DOCD: CPT | Performed by: NURSE PRACTITIONER

## 2019-03-26 PROCEDURE — G8400 PT W/DXA NO RESULTS DOC: HCPCS | Performed by: NURSE PRACTITIONER

## 2019-03-26 PROCEDURE — 2022F DILAT RTA XM EVC RTNOPTHY: CPT | Performed by: NURSE PRACTITIONER

## 2019-03-26 PROCEDURE — 3044F HG A1C LEVEL LT 7.0%: CPT | Performed by: NURSE PRACTITIONER

## 2019-03-26 PROCEDURE — 4040F PNEUMOC VAC/ADMIN/RCVD: CPT | Performed by: NURSE PRACTITIONER

## 2019-03-26 RX ORDER — LEVOTHYROXINE SODIUM 175 UG/1
175 TABLET ORAL DAILY
Qty: 90 TABLET | Refills: 3 | Status: SHIPPED | OUTPATIENT
Start: 2019-03-26 | End: 2020-04-27

## 2019-03-26 RX ORDER — ATORVASTATIN CALCIUM 40 MG/1
40 TABLET, FILM COATED ORAL DAILY
Qty: 90 TABLET | Refills: 3 | Status: SHIPPED | OUTPATIENT
Start: 2019-03-26 | End: 2020-04-27

## 2019-03-26 RX ORDER — LISINOPRIL 2.5 MG/1
2.5 TABLET ORAL DAILY
Qty: 90 TABLET | Refills: 3 | Status: SHIPPED | OUTPATIENT
Start: 2019-03-26 | End: 2020-04-27

## 2019-03-26 ASSESSMENT — ENCOUNTER SYMPTOMS
SORE THROAT: 0
NAUSEA: 0
WHEEZING: 0
VOMITING: 0
CONSTIPATION: 0
DIARRHEA: 0
SHORTNESS OF BREATH: 0
RHINORRHEA: 0
ABDOMINAL PAIN: 0
COUGH: 0

## 2019-03-26 ASSESSMENT — PATIENT HEALTH QUESTIONNAIRE - PHQ9
SUM OF ALL RESPONSES TO PHQ9 QUESTIONS 1 & 2: 1
2. FEELING DOWN, DEPRESSED OR HOPELESS: 1
1. LITTLE INTEREST OR PLEASURE IN DOING THINGS: 0
SUM OF ALL RESPONSES TO PHQ QUESTIONS 1-9: 1
SUM OF ALL RESPONSES TO PHQ QUESTIONS 1-9: 1

## 2019-04-08 ENCOUNTER — TELEPHONE (OUTPATIENT)
Dept: ONCOLOGY | Age: 64
End: 2019-04-08

## 2019-04-08 NOTE — LETTER
4/8/2019        Raz Vargas  [address] [facility]    Dear Raz Vargas:    Your healthcare provider has ordered a low dose CT scan of the chest for lung cancer screening. You will find enclosed, information about CT lung screening. Please review the statement of understanding, you will be asked to sign a copy of this at the time of your CT scan    If you have not already been contacted to make the appointment for your scan, please call our scheduling department at [phone]    Keep in mind that CT lung screening does not take the place of smoking cessation. If you are a current smoker, you will find enclosed smoking cessation resources. Please do not hesitate to contact me if you have any questions or concerns.     [address] [facility],      [facility] Scre[facility]  [phone]

## 2019-04-16 ENCOUNTER — TELEPHONE (OUTPATIENT)
Dept: PRIMARY CARE CLINIC | Age: 64
End: 2019-04-16

## 2019-04-16 ENCOUNTER — HOSPITAL ENCOUNTER (OUTPATIENT)
Dept: CT IMAGING | Age: 64
Discharge: HOME | End: 2019-04-18
Payer: MEDICARE

## 2019-04-16 DIAGNOSIS — Z87.891 PERSONAL HISTORY OF TOBACCO USE: ICD-10-CM

## 2019-04-16 PROCEDURE — G0297 LDCT FOR LUNG CA SCREEN: HCPCS

## 2019-08-06 ENCOUNTER — TELEPHONE (OUTPATIENT)
Dept: PRIMARY CARE CLINIC | Age: 64
End: 2019-08-06

## 2019-09-30 ENCOUNTER — TELEPHONE (OUTPATIENT)
Dept: PRIMARY CARE CLINIC | Age: 64
End: 2019-09-30

## 2019-10-07 ENCOUNTER — TELEPHONE (OUTPATIENT)
Dept: PRIMARY CARE CLINIC | Age: 64
End: 2019-10-07

## 2019-10-09 ENCOUNTER — HOSPITAL ENCOUNTER (OUTPATIENT)
Age: 64
Discharge: HOME | End: 2019-10-09
Payer: MEDICARE

## 2019-10-09 DIAGNOSIS — E11.8 CONTROLLED TYPE 2 DIABETES MELLITUS WITH COMPLICATION, WITHOUT LONG-TERM CURRENT USE OF INSULIN (HCC): ICD-10-CM

## 2019-10-09 DIAGNOSIS — E03.1 CONGENITAL HYPOTHYROIDISM WITHOUT GOITER: ICD-10-CM

## 2019-10-09 LAB
ALBUMIN SERPL-MCNC: 3.9 G/DL (ref 3.5–5.2)
ALBUMIN/GLOBULIN RATIO: 1.1 (ref 1–2.5)
ALP BLD-CCNC: 132 U/L (ref 35–104)
ALT SERPL-CCNC: 18 U/L (ref 5–33)
ANION GAP SERPL CALCULATED.3IONS-SCNC: 15 MMOL/L (ref 9–17)
AST SERPL-CCNC: 16 U/L
BILIRUB SERPL-MCNC: 0.35 MG/DL (ref 0.3–1.2)
BUN BLDV-MCNC: 9 MG/DL (ref 8–23)
BUN/CREAT BLD: 15 (ref 9–20)
CALCIUM SERPL-MCNC: 9.7 MG/DL (ref 8.6–10.4)
CHLORIDE BLD-SCNC: 102 MMOL/L (ref 98–107)
CHOLESTEROL/HDL RATIO: 4.6
CHOLESTEROL: 193 MG/DL
CO2: 24 MMOL/L (ref 20–31)
CREAT SERPL-MCNC: 0.62 MG/DL (ref 0.5–0.9)
ESTIMATED AVERAGE GLUCOSE: 166 MG/DL
GFR AFRICAN AMERICAN: >60 ML/MIN
GFR NON-AFRICAN AMERICAN: >60 ML/MIN
GFR SERPL CREATININE-BSD FRML MDRD: ABNORMAL ML/MIN/{1.73_M2}
GFR SERPL CREATININE-BSD FRML MDRD: ABNORMAL ML/MIN/{1.73_M2}
GLUCOSE BLD-MCNC: 159 MG/DL (ref 70–99)
HBA1C MFR BLD: 7.4 % (ref 4.8–5.9)
HDLC SERPL-MCNC: 42 MG/DL
LDL CHOLESTEROL: 119 MG/DL (ref 0–130)
POTASSIUM SERPL-SCNC: 3.9 MMOL/L (ref 3.7–5.3)
SODIUM BLD-SCNC: 141 MMOL/L (ref 135–144)
THYROXINE, FREE: 0.82 NG/DL (ref 0.93–1.7)
TOTAL PROTEIN: 7.5 G/DL (ref 6.4–8.3)
TRIGL SERPL-MCNC: 158 MG/DL
TSH SERPL DL<=0.05 MIU/L-ACNC: 10.4 MIU/L (ref 0.3–5)
VLDLC SERPL CALC-MCNC: ABNORMAL MG/DL (ref 1–30)

## 2019-10-09 PROCEDURE — 80061 LIPID PANEL: CPT

## 2019-10-09 PROCEDURE — 84439 ASSAY OF FREE THYROXINE: CPT

## 2019-10-09 PROCEDURE — 80053 COMPREHEN METABOLIC PANEL: CPT

## 2019-10-09 PROCEDURE — 36415 COLL VENOUS BLD VENIPUNCTURE: CPT

## 2019-10-09 PROCEDURE — 83036 HEMOGLOBIN GLYCOSYLATED A1C: CPT

## 2019-10-09 PROCEDURE — 84443 ASSAY THYROID STIM HORMONE: CPT

## 2019-10-15 ENCOUNTER — OFFICE VISIT (OUTPATIENT)
Dept: PRIMARY CARE CLINIC | Age: 64
End: 2019-10-15
Payer: MEDICARE

## 2019-10-15 VITALS
HEIGHT: 66 IN | RESPIRATION RATE: 18 BRPM | HEART RATE: 78 BPM | DIASTOLIC BLOOD PRESSURE: 70 MMHG | WEIGHT: 238.4 LBS | BODY MASS INDEX: 38.31 KG/M2 | SYSTOLIC BLOOD PRESSURE: 119 MMHG | TEMPERATURE: 97.8 F

## 2019-10-15 DIAGNOSIS — E03.1 CONGENITAL HYPOTHYROIDISM WITHOUT GOITER: ICD-10-CM

## 2019-10-15 DIAGNOSIS — Z12.12 SCREENING FOR COLORECTAL CANCER: Primary | ICD-10-CM

## 2019-10-15 DIAGNOSIS — E11.8 CONTROLLED TYPE 2 DIABETES MELLITUS WITH COMPLICATION, WITHOUT LONG-TERM CURRENT USE OF INSULIN (HCC): Primary | ICD-10-CM

## 2019-10-15 DIAGNOSIS — Z12.11 SCREENING FOR COLORECTAL CANCER: Primary | ICD-10-CM

## 2019-10-15 DIAGNOSIS — E78.5 DYSLIPIDEMIA: ICD-10-CM

## 2019-10-15 PROCEDURE — 1090F PRES/ABSN URINE INCON ASSESS: CPT | Performed by: NURSE PRACTITIONER

## 2019-10-15 PROCEDURE — G8400 PT W/DXA NO RESULTS DOC: HCPCS | Performed by: NURSE PRACTITIONER

## 2019-10-15 PROCEDURE — 1123F ACP DISCUSS/DSCN MKR DOCD: CPT | Performed by: NURSE PRACTITIONER

## 2019-10-15 PROCEDURE — G8427 DOCREV CUR MEDS BY ELIG CLIN: HCPCS | Performed by: NURSE PRACTITIONER

## 2019-10-15 PROCEDURE — 2022F DILAT RTA XM EVC RTNOPTHY: CPT | Performed by: NURSE PRACTITIONER

## 2019-10-15 PROCEDURE — 4040F PNEUMOC VAC/ADMIN/RCVD: CPT | Performed by: NURSE PRACTITIONER

## 2019-10-15 PROCEDURE — 4004F PT TOBACCO SCREEN RCVD TLK: CPT | Performed by: NURSE PRACTITIONER

## 2019-10-15 PROCEDURE — G8484 FLU IMMUNIZE NO ADMIN: HCPCS | Performed by: NURSE PRACTITIONER

## 2019-10-15 PROCEDURE — 3017F COLORECTAL CA SCREEN DOC REV: CPT | Performed by: NURSE PRACTITIONER

## 2019-10-15 PROCEDURE — G8417 CALC BMI ABV UP PARAM F/U: HCPCS | Performed by: NURSE PRACTITIONER

## 2019-10-15 PROCEDURE — 99214 OFFICE O/P EST MOD 30 MIN: CPT | Performed by: NURSE PRACTITIONER

## 2019-10-15 ASSESSMENT — ENCOUNTER SYMPTOMS
DIARRHEA: 0
WHEEZING: 0
VOMITING: 0
NAUSEA: 0
COUGH: 0
CONSTIPATION: 0
RHINORRHEA: 0
SORE THROAT: 0
ABDOMINAL PAIN: 0
SHORTNESS OF BREATH: 0

## 2019-10-30 ENCOUNTER — TELEPHONE (OUTPATIENT)
Dept: PRIMARY CARE CLINIC | Age: 64
End: 2019-10-30

## 2019-11-04 ENCOUNTER — TELEPHONE (OUTPATIENT)
Dept: PRIMARY CARE CLINIC | Age: 64
End: 2019-11-04

## 2019-11-04 DIAGNOSIS — R19.5 POSITIVE FIT (FECAL IMMUNOCHEMICAL TEST): Primary | ICD-10-CM

## 2019-11-04 DIAGNOSIS — Z12.12 SCREENING FOR COLORECTAL CANCER: ICD-10-CM

## 2019-11-04 DIAGNOSIS — Z12.11 SCREENING FOR COLORECTAL CANCER: ICD-10-CM

## 2019-11-04 LAB
CONTROL: PRESENT
HEMOCCULT STL QL: POSITIVE

## 2019-11-04 PROCEDURE — 82274 ASSAY TEST FOR BLOOD FECAL: CPT | Performed by: NURSE PRACTITIONER

## 2019-11-08 PROBLEM — R19.5 POSITIVE FIT (FECAL IMMUNOCHEMICAL TEST): Status: ACTIVE | Noted: 2019-11-08

## 2019-11-08 RX ORDER — SODIUM, POTASSIUM,MAG SULFATES 17.5-3.13G
SOLUTION, RECONSTITUTED, ORAL ORAL
Qty: 2 BOTTLE | Refills: 0 | Status: SHIPPED
Start: 2019-11-08 | End: 2020-06-25

## 2020-04-09 ENCOUNTER — OFFICE VISIT (OUTPATIENT)
Dept: PRIMARY CARE CLINIC | Age: 65
End: 2020-04-09
Payer: MEDICARE

## 2020-04-09 VITALS
SYSTOLIC BLOOD PRESSURE: 123 MMHG | WEIGHT: 212.6 LBS | RESPIRATION RATE: 20 BRPM | BODY MASS INDEX: 35.42 KG/M2 | HEART RATE: 62 BPM | DIASTOLIC BLOOD PRESSURE: 64 MMHG | TEMPERATURE: 98.3 F | HEIGHT: 65 IN

## 2020-04-09 PROCEDURE — G8417 CALC BMI ABV UP PARAM F/U: HCPCS | Performed by: NURSE PRACTITIONER

## 2020-04-09 PROCEDURE — 4040F PNEUMOC VAC/ADMIN/RCVD: CPT | Performed by: NURSE PRACTITIONER

## 2020-04-09 PROCEDURE — 1090F PRES/ABSN URINE INCON ASSESS: CPT | Performed by: NURSE PRACTITIONER

## 2020-04-09 PROCEDURE — 1123F ACP DISCUSS/DSCN MKR DOCD: CPT | Performed by: NURSE PRACTITIONER

## 2020-04-09 PROCEDURE — G0444 DEPRESSION SCREEN ANNUAL: HCPCS | Performed by: NURSE PRACTITIONER

## 2020-04-09 PROCEDURE — 4004F PT TOBACCO SCREEN RCVD TLK: CPT | Performed by: NURSE PRACTITIONER

## 2020-04-09 PROCEDURE — 3017F COLORECTAL CA SCREEN DOC REV: CPT | Performed by: NURSE PRACTITIONER

## 2020-04-09 PROCEDURE — 99214 OFFICE O/P EST MOD 30 MIN: CPT | Performed by: NURSE PRACTITIONER

## 2020-04-09 PROCEDURE — G8427 DOCREV CUR MEDS BY ELIG CLIN: HCPCS | Performed by: NURSE PRACTITIONER

## 2020-04-09 PROCEDURE — G8400 PT W/DXA NO RESULTS DOC: HCPCS | Performed by: NURSE PRACTITIONER

## 2020-04-09 RX ORDER — ALBUTEROL SULFATE 90 UG/1
2 AEROSOL, METERED RESPIRATORY (INHALATION)
Qty: 1 INHALER | Refills: 0 | Status: SHIPPED | OUTPATIENT
Start: 2020-04-09 | End: 2021-08-31 | Stop reason: SDUPTHER

## 2020-04-09 RX ORDER — HYDROXYZINE HYDROCHLORIDE 25 MG/1
25 TABLET, FILM COATED ORAL EVERY 8 HOURS PRN
Qty: 90 TABLET | Refills: 0 | Status: SHIPPED | OUTPATIENT
Start: 2020-04-09 | End: 2020-09-10

## 2020-04-09 RX ORDER — VENLAFAXINE HYDROCHLORIDE 37.5 MG/1
37.5 CAPSULE, EXTENDED RELEASE ORAL DAILY
Qty: 30 CAPSULE | Refills: 0 | Status: SHIPPED | OUTPATIENT
Start: 2020-04-09 | End: 2020-06-25 | Stop reason: SDUPTHER

## 2020-04-09 ASSESSMENT — PATIENT HEALTH QUESTIONNAIRE - PHQ9
4. FEELING TIRED OR HAVING LITTLE ENERGY: 3
8. MOVING OR SPEAKING SO SLOWLY THAT OTHER PEOPLE COULD HAVE NOTICED. OR THE OPPOSITE, BEING SO FIGETY OR RESTLESS THAT YOU HAVE BEEN MOVING AROUND A LOT MORE THAN USUAL: 0
5. POOR APPETITE OR OVEREATING: 2
9. THOUGHTS THAT YOU WOULD BE BETTER OFF DEAD, OR OF HURTING YOURSELF: 1
3. TROUBLE FALLING OR STAYING ASLEEP: 1
SUM OF ALL RESPONSES TO PHQ QUESTIONS 1-9: 15
6. FEELING BAD ABOUT YOURSELF - OR THAT YOU ARE A FAILURE OR HAVE LET YOURSELF OR YOUR FAMILY DOWN: 2
SUM OF ALL RESPONSES TO PHQ QUESTIONS 1-9: 15
10. IF YOU CHECKED OFF ANY PROBLEMS, HOW DIFFICULT HAVE THESE PROBLEMS MADE IT FOR YOU TO DO YOUR WORK, TAKE CARE OF THINGS AT HOME, OR GET ALONG WITH OTHER PEOPLE: 1
SUM OF ALL RESPONSES TO PHQ9 QUESTIONS 1 & 2: 4
1. LITTLE INTEREST OR PLEASURE IN DOING THINGS: 1
7. TROUBLE CONCENTRATING ON THINGS, SUCH AS READING THE NEWSPAPER OR WATCHING TELEVISION: 2
2. FEELING DOWN, DEPRESSED OR HOPELESS: 3

## 2020-04-09 ASSESSMENT — ENCOUNTER SYMPTOMS
VISUAL CHANGE: 0
NAUSEA: 0
COUGH: 0
VOMITING: 0
CONSTIPATION: 0
SORE THROAT: 0
SHORTNESS OF BREATH: 0
RHINORRHEA: 0
DIARRHEA: 0
WHEEZING: 0
ABDOMINAL PAIN: 0

## 2020-04-09 ASSESSMENT — COLUMBIA-SUICIDE SEVERITY RATING SCALE - C-SSRS
1. WITHIN THE PAST MONTH, HAVE YOU WISHED YOU WERE DEAD OR WISHED YOU COULD GO TO SLEEP AND NOT WAKE UP?: YES
6. HAVE YOU EVER DONE ANYTHING, STARTED TO DO ANYTHING, OR PREPARED TO DO ANYTHING TO END YOUR LIFE?: NO
2. HAVE YOU ACTUALLY HAD ANY THOUGHTS OF KILLING YOURSELF?: NO

## 2020-04-09 NOTE — PROGRESS NOTES
(chronic obstructive pulmonary disease) ([facility] [address]     Hyperlipidemia     Hyperthyroidism     Hypothyroidism     Obesity     Osteoarthritis     Sleep apnea     Tobacco abuse     Type II or unspecified type diabetes mellitus without mention of complication, not stated as uncontrolled       Reviewed all health maintenance requirements and ordered appropriate tests  Health Maintenance Due   Topic Date Due    DTaP/Tdap/Td vaccine (1 - Tdap) 01/02/1973    Diabetic retinal exam  11/06/2013    Shingles Vaccine (2 of 3) 09/02/2016    Annual Wellness Visit (AWV)  05/29/2019    Diabetic microalbuminuria test  03/23/2020    Diabetic foot exam  03/26/2020       Past Surgical History:     Past Surgical History:   Procedure Laterality Date    HEEL SPUR SURGERY      right heel    TONSILLECTOMY      TOOTH EXTRACTION      All of back teeth    TUBAL LIGATION          Medications:       Prior to Admission medications    Medication Sig Start Date End Date Taking?  Authorizing Provider   albuterol sulfate HFA (PROAIR HFA) 108 (90 Base) MCG/ACT inhaler Inhale 2 puffs into the lungs every 4-6 hours as needed for Wheezing 4/9/20  Yes Milas Brim Might, APRN - CNP   venlafaxine (EFFEXOR XR) 37.5 MG extended release capsule Take 1 capsule by mouth daily 4/9/20  Yes Deniz Romanoim Might, APRN - CNP   hydrOXYzine (ATARAX) 25 MG tablet Take 1 tablet by mouth every 8 hours as needed for Anxiety 4/9/20 5/9/20 Yes Milas Brim Might, APRN - CNP   SITagliptin (JANUVIA) 100 MG tablet Take 1 tablet by mouth daily 10/15/19  Yes Sudhiras Rosalinaim Might, APRN - CNP   lisinopril (ZESTRIL) 2.5 MG tablet Take 1 tablet by mouth daily 3/26/19  Yes Sudhiras Brim Might, APRN - CNP   atorvastatin (LIPITOR) 40 MG tablet Take 1 tablet by mouth daily 3/26/19  Yes Milas Brim Might, APRN - CNP   levothyroxine (SYNTHROID) 175 MCG tablet Take 1 tablet by mouth Daily 3/26/19  Yes Sudhiras Brim Might, APRN - CNP   metFORMIN (GLUCOPHAGE) 1000 MG tablet Take 1 tablet by mouth 2 times daily (with meals) 3/26/19  Yes CYRUS Freedman CNP   Blood Glucose Monitoring Suppl (TRUE METRIX METER) W/DEVICE KIT 1 each by Does not apply route daily 2/8/17  Yes CYRUS Freedman CNP   glucose blood VI test strips (TRUE METRIX BLOOD GLUCOSE TEST) strip 1 each by In Vitro route daily 2/8/17  Yes CYRUS Freedman CNP   Lancets MISC 1 each by Does not apply route daily 2/8/17  Yes Reuben Stevens MightCYRUS - CNP   aspirin EC 81 MG EC tablet Take 81 mg by mouth daily. Yes Historical Provider, MD   Na Sulfate-K Sulfate-Mg Sulf (SUPREP BOWEL PREP KIT) 17.5-3.13-1.6 GM/177ML SOLN Take as directed  Patient not taking: Reported on 4/9/2020 11/8/19   Rolando Mann MD        Allergies:       Patient has no known allergies. Social History:     Tobacco:    reports that she has been smoking. She has a 40.00 pack-year smoking history. She has never used smokeless tobacco.  Alcohol:      reports no history of alcohol use. Drug Use:  reports no history of drug use. Family History:     Family History   Problem Relation Age of Onset   [place] Cancer Father     Diabetes Sister     Diabetes Brother     Diabetes Paternal Grandmother        Review of Systems:     Positive and Negative as described in HPI    Review of Systems   Constitutional: Positive for fatigue. Negative for appetite change, chills, fever and unexpected weight change. HENT: Negative for congestion, rhinorrhea and sore throat. Eyes: Negative for visual disturbance. Respiratory: Negative for cough, shortness of breath and wheezing. Cardiovascular: Negative for chest pain and palpitations. Gastrointestinal: Negative for abdominal pain, constipation, diarrhea, nausea and vomiting. Genitourinary: Negative for difficulty urinating and dysuria. Musculoskeletal: Negative for gait problem. Skin: Negative for rash. Neurological: Positive for headaches. Negative for dizziness, seizures and syncope.    Psychiatric/Behavioral: Positive for agitation, decreased concentration, dysphoric mood and sleep disturbance. Negative for behavioral problems, confusion, hallucinations, self-injury and suicidal ideas. The patient is nervous/anxious and has insomnia. The patient is not hyperactive. Physical Exam:   Vitals:  /64 (Site: Left Upper Arm, Position: Sitting, Cuff Size: Large Adult)   Pulse 62   Temp 98.3 °F (36.8 °C) (Temporal)   Resp 20   Ht 5' 5\" (1.651 m)   Wt 212 lb 9.6 oz (96.4 kg)   BMI 35.38 kg/m²     Physical Exam  Vitals signs and nursing note reviewed. Constitutional:       General: She is not in acute distress. Appearance: Normal appearance. She is obese. She is not ill-appearing. HENT:      Mouth/Throat:      Mouth: Mucous membranes are moist.   Eyes:      General: No scleral icterus. Neck:      Musculoskeletal: Normal range of motion and neck supple. Cardiovascular:      Rate and Rhythm: Normal rate and regular rhythm. Pulmonary:      Effort: Pulmonary effort is normal.      Breath sounds: Normal breath sounds. No wheezing. Abdominal:      General: Bowel sounds are normal. There is no distension. Palpations: Abdomen is soft. Tenderness: There is no abdominal tenderness. Musculoskeletal:      Right lower leg: No edema. Left lower leg: No edema. Skin:     General: Skin is warm and dry. Findings: No rash. Neurological:      Mental Status: She is alert and oriented to person, place, and time. Psychiatric:         Attention and Perception: Attention normal.         Mood and Affect: Affect normal. Mood is depressed. Mood is not anxious. Speech: Speech normal.         Behavior: Behavior normal. Behavior is cooperative. Thought Content: Thought content normal. Thought content does not include homicidal or suicidal ideation. Thought content does not include homicidal or suicidal plan.          Cognition and Memory: Cognition normal.         Judgment: Judgment normal.         Data: (EFFEXOR XR) 37.5 MG extended release capsule 30 capsule 0     Sig: Take 1 capsule by mouth daily    hydrOXYzine (ATARAX) 25 MG tablet 90 tablet 0     Sig: Take 1 tablet by mouth every 8 hours as needed for Anxiety         Return in about 4 weeks (around 5/7/2020) for check up.

## 2020-04-09 NOTE — PATIENT INSTRUCTIONS
depressed. Your relationships may suffer, and you may not do well at work or school. What can you do to manage stress? How to relax your mind  · Write. It may help to write about things that are bothering you. This helps you find out how much stress you feel and what is causing it. When you know this, you can find better ways to cope. · Let your feelings out. Talk, laugh, cry, and express anger when you need to. Talking with friends, family, a counselor, or a member of the clergy about your feelings is a healthy way to relieve stress. · Do something you enjoy. For example, listen to music or go to a movie. Practice your hobby or do volunteer work. · Meditate. This can help you relax, because you are not worrying about what happened before or what may happen in the future. · Do guided imagery. Imagine yourself in any setting that helps you feel calm. You can use audiotapes, books, or a teacher to guide you. How to relax your body  · Do something active. Exercise or activity can help reduce stress. Walking is a great way to get started. Even everyday activities such as housecleaning or yard work can help. · Do breathing exercises. For example:  ? From a standing position, bend forward from the waist with your knees slightly bent. Let your arms dangle close to the floor. ? Breathe in slowly and deeply as you return to a standing position. Roll up slowly and lift your head last.  ? Hold your breath for just a few seconds in the standing position. ? Breathe out slowly and bend forward from the waist.  · Try yoga or errol chi. These techniques combine exercise and meditation. You may need some training at first to learn them. What can you do to prevent stress? · Manage your time. This helps you find time to do the things you want and need to do. · Get enough sleep. Your body recovers from the stresses of the day while you are sleeping. · Get support.  Your family, friends, and community can make a difference in

## 2020-04-15 ENCOUNTER — HOSPITAL ENCOUNTER (OUTPATIENT)
Age: 65
Discharge: HOME | End: 2020-04-15
Payer: MEDICARE

## 2020-04-15 LAB
ALT SERPL-CCNC: 22 U/L (ref 5–33)
ANION GAP SERPL CALCULATED.3IONS-SCNC: 14 MMOL/L (ref 9–17)
AST SERPL-CCNC: 20 U/L
BUN BLDV-MCNC: 8 MG/DL (ref 8–23)
BUN/CREAT BLD: 13 (ref 9–20)
CALCIUM SERPL-MCNC: 9.9 MG/DL (ref 8.6–10.4)
CHLORIDE BLD-SCNC: 101 MMOL/L (ref 98–107)
CO2: 25 MMOL/L (ref 20–31)
CREAT SERPL-MCNC: 0.6 MG/DL (ref 0.5–0.9)
CREATININE URINE: 117.2 MG/DL (ref 28–217)
ESTIMATED AVERAGE GLUCOSE: 151 MG/DL
GFR AFRICAN AMERICAN: >60 ML/MIN
GFR NON-AFRICAN AMERICAN: >60 ML/MIN
GFR SERPL CREATININE-BSD FRML MDRD: ABNORMAL ML/MIN/{1.73_M2}
GFR SERPL CREATININE-BSD FRML MDRD: ABNORMAL ML/MIN/{1.73_M2}
GLUCOSE BLD-MCNC: 116 MG/DL (ref 70–99)
HBA1C MFR BLD: 6.9 % (ref 4.8–5.9)
MICROALBUMIN/CREAT 24H UR: 23 MG/L
MICROALBUMIN/CREAT UR-RTO: 20 MCG/MG CREAT
POTASSIUM SERPL-SCNC: 4.1 MMOL/L (ref 3.7–5.3)
SODIUM BLD-SCNC: 140 MMOL/L (ref 135–144)
THYROXINE, FREE: 1.55 NG/DL (ref 0.93–1.7)
TSH SERPL DL<=0.05 MIU/L-ACNC: 1.15 MIU/L (ref 0.3–5)

## 2020-04-15 PROCEDURE — 84439 ASSAY OF FREE THYROXINE: CPT

## 2020-04-15 PROCEDURE — 82570 ASSAY OF URINE CREATININE: CPT

## 2020-04-15 PROCEDURE — 83036 HEMOGLOBIN GLYCOSYLATED A1C: CPT

## 2020-04-15 PROCEDURE — 80048 BASIC METABOLIC PNL TOTAL CA: CPT

## 2020-04-15 PROCEDURE — 84443 ASSAY THYROID STIM HORMONE: CPT

## 2020-04-15 PROCEDURE — 84450 TRANSFERASE (AST) (SGOT): CPT

## 2020-04-15 PROCEDURE — 84460 ALANINE AMINO (ALT) (SGPT): CPT

## 2020-04-15 PROCEDURE — 36415 COLL VENOUS BLD VENIPUNCTURE: CPT

## 2020-04-15 PROCEDURE — 82043 UR ALBUMIN QUANTITATIVE: CPT

## 2020-04-27 RX ORDER — LEVOTHYROXINE SODIUM 175 UG/1
TABLET ORAL
Qty: 90 TABLET | Refills: 2 | Status: SHIPPED | OUTPATIENT
Start: 2020-04-27 | End: 2020-12-28 | Stop reason: SDUPTHER

## 2020-04-27 RX ORDER — LISINOPRIL 2.5 MG/1
TABLET ORAL
Qty: 90 TABLET | Refills: 2 | Status: SHIPPED | OUTPATIENT
Start: 2020-04-27 | End: 2020-12-28 | Stop reason: SDUPTHER

## 2020-04-27 RX ORDER — ATORVASTATIN CALCIUM 40 MG/1
TABLET, FILM COATED ORAL
Qty: 90 TABLET | Refills: 2 | Status: SHIPPED | OUTPATIENT
Start: 2020-04-27 | End: 2020-12-28 | Stop reason: SDUPTHER

## 2020-05-20 ENCOUNTER — TELEPHONE (OUTPATIENT)
Dept: ONCOLOGY | Age: 65
End: 2020-05-20

## 2020-06-25 ENCOUNTER — OFFICE VISIT (OUTPATIENT)
Dept: PRIMARY CARE CLINIC | Age: 65
End: 2020-06-25
Payer: MEDICARE

## 2020-06-25 VITALS
BODY MASS INDEX: 37.22 KG/M2 | TEMPERATURE: 97.4 F | DIASTOLIC BLOOD PRESSURE: 58 MMHG | SYSTOLIC BLOOD PRESSURE: 116 MMHG | HEIGHT: 65 IN | WEIGHT: 223.4 LBS | HEART RATE: 67 BPM | RESPIRATION RATE: 20 BRPM

## 2020-06-25 PROBLEM — E66.01 MORBIDLY OBESE (HCC): Status: ACTIVE | Noted: 2020-06-25

## 2020-06-25 PROCEDURE — 4040F PNEUMOC VAC/ADMIN/RCVD: CPT | Performed by: NURSE PRACTITIONER

## 2020-06-25 PROCEDURE — 3017F COLORECTAL CA SCREEN DOC REV: CPT | Performed by: NURSE PRACTITIONER

## 2020-06-25 PROCEDURE — 4004F PT TOBACCO SCREEN RCVD TLK: CPT | Performed by: NURSE PRACTITIONER

## 2020-06-25 PROCEDURE — G8417 CALC BMI ABV UP PARAM F/U: HCPCS | Performed by: NURSE PRACTITIONER

## 2020-06-25 PROCEDURE — G8427 DOCREV CUR MEDS BY ELIG CLIN: HCPCS | Performed by: NURSE PRACTITIONER

## 2020-06-25 PROCEDURE — 3044F HG A1C LEVEL LT 7.0%: CPT | Performed by: NURSE PRACTITIONER

## 2020-06-25 PROCEDURE — 1090F PRES/ABSN URINE INCON ASSESS: CPT | Performed by: NURSE PRACTITIONER

## 2020-06-25 PROCEDURE — G8400 PT W/DXA NO RESULTS DOC: HCPCS | Performed by: NURSE PRACTITIONER

## 2020-06-25 PROCEDURE — 99214 OFFICE O/P EST MOD 30 MIN: CPT | Performed by: NURSE PRACTITIONER

## 2020-06-25 PROCEDURE — 2022F DILAT RTA XM EVC RTNOPTHY: CPT | Performed by: NURSE PRACTITIONER

## 2020-06-25 PROCEDURE — 1123F ACP DISCUSS/DSCN MKR DOCD: CPT | Performed by: NURSE PRACTITIONER

## 2020-06-25 RX ORDER — VENLAFAXINE HYDROCHLORIDE 37.5 MG/1
37.5 CAPSULE, EXTENDED RELEASE ORAL DAILY
Qty: 90 CAPSULE | Refills: 1 | Status: SHIPPED | OUTPATIENT
Start: 2020-06-25 | End: 2020-12-28 | Stop reason: SDUPTHER

## 2020-06-25 ASSESSMENT — ENCOUNTER SYMPTOMS
CONSTIPATION: 0
NAUSEA: 0
DIARRHEA: 0
ABDOMINAL PAIN: 0
VOMITING: 0
COUGH: 0
WHEEZING: 0
VISUAL CHANGE: 0
RHINORRHEA: 0
SHORTNESS OF BREATH: 0
SORE THROAT: 0

## 2020-06-25 NOTE — PATIENT INSTRUCTIONS
sugar levels. A registered dietitian or certified diabetes educator can help you plan how many carbs to include in each meal and snack. For most adults, a guideline for the daily amount of carbs is:  · 45 to 60 grams at each meal. That's about the same as 3 to 4 carbohydrate servings. · 15 to 20 grams at each snack. That's about the same as 1 carbohydrate serving. Count carbs  Counting carbs lets you know how much rapid-acting insulin to take before you eat. If you use an insulin pump, you get a constant rate of insulin during the day. So the pump must be programmed at meals. This gives you extra insulin to cover the rise in blood sugar after meals. If you take insulin:  · Learn your own insulin-to-carb ratio. You and your diabetes health professional will figure out the ratio. You can do this by testing your blood sugar after meals. For example, you may need a certain amount of insulin for every 15 grams of carbs. · Add up the carb grams in a meal. Then you can figure out how many units of insulin to take based on your insulin-to-carb ratio. · Exercise lowers blood sugar. You can use less insulin than you would if you were not doing exercise. Keep in mind that timing matters. If you exercise within 1 hour after a meal, your body may need less insulin for that meal than it would if you exercised 3 hours after the meal. Test your blood sugar to find out how exercise affects your need for insulin. If you do or don't take insulin:  · Look at labels on packaged foods. This can tell you how many carbs are in a serving. You can also use guides from the American Diabetes Association. · Be aware of portions, or serving sizes. If a package has two servings and you eat the whole package, you need to double the number of grams of carbohydrate listed for one serving. · Protein, fat, and fiber do not raise blood sugar as much as carbs do.  If you eat a lot of these nutrients in a meal, your blood sugar will rise more slowly than it would otherwise. Eat from all food groups  · Eat at least three meals a day. · Plan meals to include food from all the food groups. The food groups include grains, fruits, dairy, proteins, and vegetables. · Talk to your dietitian or diabetes educator about ways to add limited amounts of sweets into your meal plan. · If you drink alcohol, talk to your doctor. It may not be recommended when you are taking certain diabetes medicines. Where can you learn more? Go to https://[employer].Planet Daily. org and sign in to your [employer] account. Enter O852 in the [employer] box to learn more about \"Counting Carbohydrates: Care Instructions. \"     If you do not have an account, please click on the \"Sign Up Now\" link. Current as of: December 20, 2019               Content Version: 12.5  © 2805-8631 Healthwise, Incorporated. Care instructions adapted under license by [facility] ([facility]). If you have questions about a medical condition or this instruction, always ask your healthcare professional. [address] any warranty or liability for your use of this information.

## 2020-06-25 NOTE — PROGRESS NOTES
concentrated sweets. She has not had a previous visit with a dietitian. She never participates in exercise. There is no change in her home blood glucose trend. Her breakfast blood glucose range is generally 140-180 mg/dl. An ACE inhibitor/angiotensin II receptor blocker is being taken. She does not see a podiatrist.Eye exam is current. Hyperlipidemia   This is a chronic problem. The current episode started more than 1 year ago. The problem is controlled. Recent lipid tests were reviewed and are normal. Exacerbating diseases include diabetes, hypothyroidism and obesity. She has no history of chronic renal disease, liver disease or nephrotic syndrome. Factors aggravating her hyperlipidemia include fatty foods and smoking. Pertinent negatives include no chest pain, leg pain, myalgias or shortness of breath. Current antihyperlipidemic treatment includes statins. The current treatment provides moderate improvement of lipids. Compliance problems include adherence to exercise and adherence to diet. Risk factors for coronary artery disease include diabetes mellitus, hypertension, obesity, family history, dyslipidemia, stress, a sedentary lifestyle and post-menopausal.   Mental Health Problem   The primary symptoms include dysphoric mood (improving). The primary symptoms do not include delusions, hallucinations, bizarre behavior, disorganized speech, negative symptoms or somatic symptoms. The current episode started more than 1 month ago. This is a chronic problem. The onset of the illness is precipitated by emotional stress and a stressful event. The degree of incapacity that she is experiencing as a consequence of her illness is moderate. Sequelae of the illness include harmed interpersonal relations. Additional symptoms of the illness include anhedonia and insomnia.  Additional symptoms of the illness do not include hypersomnia, appetite change, unexpected weight change, fatigue, agitation, psychomotor retardation, tablet TAKE ONE TABLET BY MOUTH TWICE A DAY WITH MEALS 4/27/20  Yes CYRUS Prabhakar CNP   levothyroxine (SYNTHROID) 175 MCG tablet TAKE ONE TABLET BY MOUTH DAILY 4/27/20  Yes CYRUS Prabhakar CNP   atorvastatin (LIPITOR) 40 MG tablet TAKE ONE TABLET BY MOUTH DAILY 4/27/20  Yes CYRUS Prabhakar CNP   albuterol sulfate HFA (PROAIR HFA) 108 (90 Base) MCG/ACT inhaler Inhale 2 puffs into the lungs every 4-6 hours as needed for Wheezing 4/9/20  Yes CYRUS Prabhakar CNP   Blood Glucose Monitoring Suppl (TRUE METRIX METER) W/DEVICE KIT 1 each by Does not apply route daily 2/8/17  Yes CYRUS Prabhakar CNP   glucose blood VI test strips (TRUE METRIX BLOOD GLUCOSE TEST) strip 1 each by In Vitro route daily 2/8/17  Yes CYRUS Prabhakar CNP   Lancets MISC 1 each by Does not apply route daily 2/8/17  Yes CYRUS Prabhakar CNP   aspirin EC 81 MG EC tablet Take 81 mg by mouth daily. Yes Historical Provider, MD        Allergies:       Patient has no known allergies. Social History:     Tobacco:    reports that she has been smoking. She has a 40.00 pack-year smoking history. She has never used smokeless tobacco.  Alcohol:      reports no history of alcohol use. Drug Use:  reports no history of drug use. Family History:     Family History   Problem Relation Age of Onset   [facility] Cancer Father     Diabetes Sister     Diabetes Brother     Diabetes Paternal Grandmother        Review of Systems:     Positive and Negative as described in HPI    Review of Systems   Constitutional: Negative for appetite change, chills, fatigue, fever and unexpected weight change. HENT: Negative for congestion, rhinorrhea and sore throat. Eyes: Negative for visual disturbance. Respiratory: Negative for cough, shortness of breath and wheezing. Cardiovascular: Negative for chest pain and palpitations. Gastrointestinal: Negative for abdominal pain, constipation, diarrhea, nausea and vomiting.    Endocrine: Negative for polydipsia, polyphagia and polyuria. Genitourinary: Negative for difficulty urinating and dysuria. Musculoskeletal: Negative for gait problem and myalgias. Skin: Negative for rash. Neurological: Negative for dizziness, seizures, syncope and headaches. Psychiatric/Behavioral: Positive for dysphoric mood (improving). Negative for agitation, behavioral problems, confusion, decreased concentration, hallucinations, self-injury, sleep disturbance and suicidal ideas. The patient has insomnia. The patient is not nervous/anxious and is not hyperactive. Physical Exam:   Vitals:  BP (!) 116/58 (Site: Left Upper Arm, Position: Sitting, Cuff Size: Large Adult)   Pulse 67   Temp 97.4 °F (36.3 °C) (Temporal)   Resp 20   Ht 5' 4.5\" (1.638 m)   Wt 223 lb 6.4 oz (101.3 kg)   BMI 37.75 kg/m²     Physical Exam  Vitals signs and nursing note reviewed. Constitutional:       General: She is not in acute distress. Appearance: Normal appearance. She is obese. She is not ill-appearing. HENT:      Mouth/Throat:      Mouth: Mucous membranes are moist.      Pharynx: Posterior oropharyngeal erythema present. Eyes:      General: No scleral icterus. Conjunctiva/sclera: Conjunctivae normal.   Neck:      Musculoskeletal: Normal range of motion and neck supple. Cardiovascular:      Rate and Rhythm: Normal rate and regular rhythm. Heart sounds: No murmur. Pulmonary:      Effort: Pulmonary effort is normal.      Breath sounds: Normal breath sounds. No wheezing. Abdominal:      General: Bowel sounds are normal. There is no distension. Palpations: Abdomen is soft. Tenderness: There is no abdominal tenderness. Musculoskeletal:      Right lower leg: No edema. Left lower leg: No edema. Skin:     General: Skin is warm and dry. Findings: No rash. Neurological:      Mental Status: She is alert and oriented to person, place, and time.    Psychiatric:         Attention and Perception: Attention and perception normal.         Mood and Affect: Mood is depressed (mildly). Mood is not anxious. Speech: Speech normal.         Behavior: Behavior normal.         Thought Content: Thought content normal. Thought content does not include homicidal or suicidal ideation. Thought content does not include homicidal or suicidal plan. Cognition and Memory: Cognition normal.         Judgment: Judgment normal.         Data:     Lab Results   Component Value Date     04/15/2020    K 4.1 04/15/2020     04/15/2020    CO2 25 04/15/2020    BUN 8 04/15/2020    CREATININE 0.60 04/15/2020    GLUCOSE 116 04/15/2020    GLUCOSE 103 05/24/2012    PROT 7.5 10/09/2019    LABALBU 3.9 10/09/2019    LABALBU 4.5 10/27/2011    BILITOT 0.35 10/09/2019    ALKPHOS 132 10/09/2019    AST 20 04/15/2020    ALT 22 04/15/2020     Lab Results   Component Value Date    WBC 9.0 03/23/2019    RBC 5.46 03/23/2019    RBC 4.83 05/24/2012    HGB 15.8 03/23/2019    HCT 48.4 03/23/2019    MCV 88.6 03/23/2019    MCH 28.9 03/23/2019    MCHC 32.6 03/23/2019    RDW 12.7 03/23/2019     03/23/2019     05/24/2012    MPV 9.5 03/23/2019     Lab Results   Component Value Date    TSH 1.15 04/15/2020     Lab Results   Component Value Date    CHOL 193 10/09/2019    HDL 42 10/09/2019    LABA1C 6.9 04/15/2020       Assessment/Plan:      Diagnosis Orders   1. Controlled type 2 diabetes mellitus with complication, without long-term current use of insulin ([facility])   DIABETES FOOT EXAM    SITagliptin (JANUVIA) 100 MG tablet    CBC Auto Differential    ALT    AST    Basic Metabolic Panel    Hemoglobin A1C    Lipid Panel    Microalbumin, Ur   2. Dysthymia  venlafaxine (EFFEXOR XR) 37.5 MG extended release capsule   3. Stress reaction  venlafaxine (EFFEXOR XR) 37.5 MG extended release capsule   4. Congenital hypothyroidism without goiter  T4, Free    TSH without Reflex   5.  Morbidly obese ([address]       Diabetes-continue

## 2020-07-02 ENCOUNTER — OFFICE VISIT (OUTPATIENT)
Dept: PRIMARY CARE CLINIC | Age: 65
End: 2020-07-02
Payer: MEDICARE

## 2020-07-02 VITALS
TEMPERATURE: 96.5 F | HEART RATE: 69 BPM | SYSTOLIC BLOOD PRESSURE: 118 MMHG | BODY MASS INDEX: 38.06 KG/M2 | DIASTOLIC BLOOD PRESSURE: 61 MMHG | WEIGHT: 225.2 LBS

## 2020-07-02 PROCEDURE — G8400 PT W/DXA NO RESULTS DOC: HCPCS | Performed by: NURSE PRACTITIONER

## 2020-07-02 PROCEDURE — G8427 DOCREV CUR MEDS BY ELIG CLIN: HCPCS | Performed by: NURSE PRACTITIONER

## 2020-07-02 PROCEDURE — 99213 OFFICE O/P EST LOW 20 MIN: CPT | Performed by: NURSE PRACTITIONER

## 2020-07-02 PROCEDURE — 4040F PNEUMOC VAC/ADMIN/RCVD: CPT | Performed by: NURSE PRACTITIONER

## 2020-07-02 PROCEDURE — 1123F ACP DISCUSS/DSCN MKR DOCD: CPT | Performed by: NURSE PRACTITIONER

## 2020-07-02 PROCEDURE — 1090F PRES/ABSN URINE INCON ASSESS: CPT | Performed by: NURSE PRACTITIONER

## 2020-07-02 PROCEDURE — 4004F PT TOBACCO SCREEN RCVD TLK: CPT | Performed by: NURSE PRACTITIONER

## 2020-07-02 PROCEDURE — 3017F COLORECTAL CA SCREEN DOC REV: CPT | Performed by: NURSE PRACTITIONER

## 2020-07-02 PROCEDURE — G8417 CALC BMI ABV UP PARAM F/U: HCPCS | Performed by: NURSE PRACTITIONER

## 2020-07-02 RX ORDER — AMOXICILLIN AND CLAVULANATE POTASSIUM 875; 125 MG/1; MG/1
1 TABLET, FILM COATED ORAL 2 TIMES DAILY
Qty: 20 TABLET | Refills: 0 | Status: SHIPPED | OUTPATIENT
Start: 2020-07-02 | End: 2020-07-12

## 2020-07-02 RX ORDER — SITAGLIPTIN 100 MG/1
100 TABLET, FILM COATED ORAL DAILY
COMMUNITY
Start: 2020-06-25 | End: 2020-11-18 | Stop reason: SDUPTHER

## 2020-07-02 ASSESSMENT — ENCOUNTER SYMPTOMS
TROUBLE SWALLOWING: 0
VOMITING: 0
SORE THROAT: 0
NAUSEA: 0

## 2020-07-02 NOTE — PROGRESS NOTES
[facility] WALK-IN CARE  [address] [facility] [address] [facility]  Dept: [phone]  Dept Fax: [phone]    Maritza Rob is a 76 y.o. female who present[facility] the [facility] today for her medical conditions/complaints as noted below. Maritza Rob is c/o of Dental Pain (left lower side of teeth hurts. Pain started yesterday. )      HPI:     Yara Kay is here today for a walk in visit for dental pain. She contacted her dentist however she is unable to get in until July 14. See below for further detail    Dental Pain    This is a new problem. The current episode started yesterday. The problem has been gradually worsening. The pain is at a severity of 7/10. The pain is moderate. Associated symptoms include thermal sensitivity. Pertinent negatives include no difficulty swallowing, fever or oral bleeding. Treatments tried: oragel. The treatment provided mild relief.        Past Medical History:   Diagnosis Date    COPD (chronic obstructive pulmonary disease) ([address]     Hyperlipidemia     Hyperthyroidism     Hypothyroidism     Obesity     Osteoarthritis     Sleep apnea     Tobacco abuse     Type II or unspecified type diabetes mellitus without mention of complication, not stated as uncontrolled         Current Outpatient Medications   Medication Sig Dispense Refill    amoxicillin-clavulanate (AUGMENTIN) 875-125 MG per tablet Take 1 tablet by mouth 2 times daily for 10 days 20 tablet 0    venlafaxine (EFFEXOR XR) 37.5 MG extended release capsule Take 1 capsule by mouth daily 90 capsule 1    SITagliptin (JANUVIA) 100 MG tablet Take 1 tablet by mouth daily 90 tablet 3    lisinopril (PRINIVIL;ZESTRIL) 2.5 MG tablet TAKE ONE TABLET BY MOUTH DAILY 90 tablet 2    metFORMIN (GLUCOPHAGE) 1000 MG tablet TAKE ONE TABLET BY MOUTH TWICE A DAY WITH MEALS 180 tablet 2    levothyroxine (SYNTHROID) 175 MCG tablet TAKE ONE TABLET BY MOUTH DAILY 90 tablet 2    atorvastatin (LIPITOR) 40 MG tablet TAKE ONE TABLET BY MOUTH DAILY 90 tablet 2    albuterol sulfate HFA (PROAIR HFA) 108 (90 Base) MCG/ACT inhaler Inhale 2 puffs into the lungs every 4-6 hours as needed for Wheezing 1 Inhaler 0    Blood Glucose Monitoring Suppl (TRUE METRIX METER) W/DEVICE KIT 1 each by Does not apply route daily 1 kit 0    glucose blood VI test strips (TRUE METRIX BLOOD GLUCOSE TEST) strip 1 each by In Vitro route daily 100 each 3    Lancets MISC 1 each by Does not apply route daily 100 each 3    aspirin EC 81 MG EC tablet Take 81 mg by mouth daily.  JANUVIA 100 MG tablet Take 100 mg by mouth daily       No current facility-administered medications for this visit. No Known Allergies    Subjective:      Review of Systems   Constitutional: Negative for activity change, chills, fatigue and fever. HENT: Positive for dental problem. Negative for sneezing, sore throat and trouble swallowing. Dental pain and dental caries   Gastrointestinal: Negative for nausea and vomiting. Musculoskeletal: Negative for myalgias. Objective:     Physical Exam  Vitals signs and nursing note reviewed. Constitutional:       General: She is not in acute distress. Appearance: Normal appearance. She is not toxic-appearing or diaphoretic. HENT:      Head: Normocephalic and atraumatic. Mouth/Throat:      Mouth: Mucous membranes are moist. No angioedema. Dentition: Abnormal dentition. Gingival swelling and dental caries present. Cardiovascular:      Rate and Rhythm: Normal rate and regular rhythm. Pulmonary:      Effort: Pulmonary effort is normal.      Breath sounds: Normal breath sounds. No wheezing, rhonchi or rales. Neurological:      General: No focal deficit present. Mental Status: She is alert and oriented to person, place, and time.    Psychiatric:         Mood and Affect: Mood normal.         Behavior: Behavior normal.       /61   Pulse 69   Temp 96.5 °F (35.8 °C) (Temporal)   Wt 225 lb 3.2 oz (102.2 kg)   BMI 38.06 kg/m²     Assessment:      Diagnosis Orders   1. Dental infection  amoxicillin-clavulanate (AUGMENTIN) 875-125 MG per tablet     Will start Augmentin twice daily x10 days. For pain control advised her to continue Orajel along with Tylenol. She is to take the Tylenol as directed on the bottle. She is to keep scheduled appointment with her dentist.     Plan:       Discussed exam, POCT findings, plan of care (including prescriptive and supportive as listed below) and follow-up atlength with patient. Reviewed all prescribed and recommended medications, administration and side effects. Encouraged to return to [address] [place] [address] for noimprovement and or worsening of symptoms. To ER or call 911 if any difficulty breathing, shortness of breath, inability to swallow, hives or temp greater than 103 degrees. Questions answered. They verbalized understandingand agreement. Return if symptoms worsen or fail to improve. Orders Placed This Encounter   Medications    amoxicillin-clavulanate (AUGMENTIN) 875-125 MG per tablet     Sig: Take 1 tablet by mouth 2 times daily for 10 days     Dispense:  20 tablet     Refill:  0          All patient questions answered. Pt voiced understanding.       Electronically signed by CYRUS Conn CNP on 7/2/2020 at 12:09 PM

## 2020-07-02 NOTE — PATIENT INSTRUCTIONS
Patient Education        Abscessed Tooth: Care Instructions  Your Care Instructions     An abscessed tooth is a tooth that has a pocket of pus in the tissues around it. Pus forms when the body tries to fight an infection caused by bacteria. If the pus cannot drain, it forms an abscess. An abscessed tooth can cause red, swollen gums and throbbing pain, especially when you chew. You may have a bad taste in your mouth and a fever, and your jaw may swell. Damage to the tooth, untreated tooth decay, or gum disease can cause an abscessed tooth. An abscessed tooth needs to be treated by a dental professional right away. If it is not treated, the infection could spread to other parts of your body. Your dentist will give you antibiotics to stop the infection. He or she may make a hole in the tooth or cut open (roseline) the abscess inside your mouth so that the infection can drain, which should relieve your pain. You may need to have a root canal treatment, which tries to save your tooth by taking out the infected pulp and replacing it with a healing medicine and/or a filling. If these treatments do not work, your tooth may have to be removed. Follow-up care is a key part of your treatment and safety. Be sure to make and go to all appointments, and call your doctor if you are having problems. It's also a good idea to know your test results and keep a list of the medicines you take. How can you care for yourself at home? · Reduce pain and swelling in your face and jaw by putting ice or a cold pack on the outside of your cheek. Do this for 10 to 20 minutes at a time. Put a thin cloth between the ice and your skin. · Take pain medicines exactly as directed. ? If the doctor gave you a prescription medicine for pain, take it as prescribed. ? If you are not taking a prescription pain medicine, ask your doctor if you can take an over-the-counter medicine. · Take antibiotics as directed.  Do not stop taking them just because you feel better. You need to take the full course of antibiotics. To prevent tooth abscess  · Brush and floss every day. Have regular dental checkups. · Eat a healthy diet. Avoid sugary foods and drinks. · Do not smoke or vape with nicotine. And don't use spit tobacco. Tobacco and nicotine slow your ability to heal. They increase your risk for gum disease and cancer of the mouth and throat. If you need help quitting, talk to your doctor about stop-smoking programs and medicines. These can increase your chances of quitting for good. When should you call for help? [id number] anytime you think you may need emergency care. For example, call if:  · You have trouble breathing. Call your doctor now or seek immediate medical care if:  · You have new or worse symptoms of infection, such as:  ? Increased pain, swelling, warmth, or redness. ? Red streaks leading from the area. ? Pus draining from the area. ? A fever. Watch closely for changes in your health, and be sure to contact your doctor if:  · You do not get better as expected. Where can you learn more? Go to https://[employer] Group.NewGoTos. org and sign in to your [employer] account. Enter L172 in the [facility] box to learn more about \"Abscessed Tooth: Care Instructions. \"     If you do not have an account, please click on the \"Sign Up Now\" link. Current as of: March 25, 2020               Content Version: 12.5  © 0798-8208 Healthwise, Incorporated. Care instructions adapted under license by [facility] ([facility]). If you have questions about a medical condition or this instruction, always ask your healthcare professional. [place] [address] any warranty or liability for your use of this information.

## 2020-07-21 ENCOUNTER — TELEPHONE (OUTPATIENT)
Dept: PRIMARY CARE CLINIC | Age: 65
End: 2020-07-21

## 2020-07-21 ENCOUNTER — HOSPITAL ENCOUNTER (OUTPATIENT)
Dept: CT IMAGING | Age: 65
Discharge: HOME | End: 2020-07-23
Payer: MEDICARE

## 2020-07-21 PROCEDURE — G0297 LDCT FOR LUNG CA SCREEN: HCPCS

## 2020-07-27 RX ORDER — AMOXICILLIN AND CLAVULANATE POTASSIUM 875; 125 MG/1; MG/1
1 TABLET, FILM COATED ORAL 2 TIMES DAILY
Qty: 20 TABLET | Refills: 0 | Status: CANCELLED | OUTPATIENT
Start: 2020-07-27 | End: 2020-08-06

## 2020-07-27 RX ORDER — AMOXICILLIN 500 MG/1
500 CAPSULE ORAL 2 TIMES DAILY
Qty: 20 CAPSULE | Refills: 0 | Status: SHIPPED | OUTPATIENT
Start: 2020-07-27 | End: 2020-08-06

## 2020-07-27 NOTE — TELEPHONE ENCOUNTER
Phone call from patient requesting refill of antibiotics for tooth ache. States she has not been able to get to dentist. hospitals had appt but it was cancelled. Health Maintenance   Topic Date Due    Annual Wellness Visit (AWV)  05/29/2019    Breast cancer screen  07/16/2020    DEXA (modify frequency per FRAX score)  10/15/2020 (Originally 1/2/2009)    Diabetic retinal exam  06/25/2021 (Originally 11/6/2013)    DTaP/Tdap/Td vaccine (1 - Tdap) 06/25/2021 (Originally 1/2/1973)    Shingles Vaccine (2 of 3) 06/25/2021 (Originally 9/2/2016)    Flu vaccine (1) 09/01/2020    Lipid screen  10/09/2020    Colon Cancer Screen FIT/FOBT  11/04/2020    A1C test (Diabetic or Prediabetic)  04/15/2021    Diabetic microalbuminuria test  04/15/2021    TSH testing  04/15/2021    Potassium monitoring  04/15/2021    Creatinine monitoring  04/15/2021    Diabetic foot exam  06/25/2021    Pneumococcal 65+ years Vaccine (1 of 1 - PPSV23) 07/08/2021    Low dose CT lung screening  07/21/2021    Hepatitis C screen  Completed    Hepatitis A vaccine  Aged Out    Hib vaccine  Aged Out    Meningococcal (ACWY) vaccine  Aged Out             (applicable per patient's age: Cancer Screenings, Depression Screening, Fall Risk Screening, Immunizations)    Hemoglobin A1C (%)   Date Value   04/15/2020 6.9 (H)   10/09/2019 7.4 (H)   03/23/2019 6.8 (H)     Microalb/Crt.  Ratio (mcg/mg creat)   Date Value   04/15/2020 20     LDL Cholesterol (mg/dL)   Date Value   10/09/2019 119     AST (U/L)   Date Value   04/15/2020 20     ALT (U/L)   Date Value   04/15/2020 22     BUN (mg/dL)   Date Value   04/15/2020 8      (goal A1C is < 7)   (goal LDL is <100) need 30-50% reduction from baseline     BP Readings from Last 3 Encounters:   07/02/20 118/61   06/25/20 (!) 116/58   04/09/20 123/64    (goal /80)      All Future Testing planned in [facility]:  Lab Frequency Next Occurrence   COLONOSCOPY W/ OR W/O BIOPSY Once 11/08/2019   CBC Auto Differential Once 12/22/2020   ALT Once 12/25/2020   AST Once 20/19/7644   Basic Metabolic Panel Once 20/16/5481   Hemoglobin A1C Once 12/22/2020   Lipid Panel Once 12/22/2020   Microalbumin, Ur Once 12/22/2020   T4, Free Once 12/22/2020   TSH without Reflex Once 12/22/2020       Next Visit Date:  Future Appointments   Date Time Provider Christopher Petersen   9/30/2020  9:00 AM Stephanie Johnson, APRN - CNP [facility]   12/28/2020 10:00 AM Stephanie Johnson, CYRUS - CNP [facility]            Patient Active Problem List:     Dermatophytosis of foot     Diabetes type 2, controlled ([address]     Dyslipidemia     Hypothyroid     Obesity (BMI 30-39. 9)     Essential hypertension     Noncompliance with therapeutic plan     Tobacco abuse     Positive FIT (fecal immunochemical test)     Morbidly obese (HCC)

## 2020-09-10 RX ORDER — HYDROXYZINE HYDROCHLORIDE 25 MG/1
TABLET, FILM COATED ORAL
Qty: 90 TABLET | Refills: 0 | Status: SHIPPED | OUTPATIENT
Start: 2020-09-10 | End: 2020-12-29

## 2020-09-10 NOTE — TELEPHONE ENCOUNTER
12/22/2020   Microalbumin, Ur Once 12/22/2020   T4, Free Once 12/22/2020   TSH without Reflex Once 12/22/2020       Next Visit Date:  Future Appointments   Date Time Provider Christopher Petersen   9/30/2020  9:00 AM CYRUS Valentin CNP [facility]   12/28/2020 10:00 AM CYRUS Valentin [facility]            Patient Active Problem List:     Dermatophytosis of foot     Diabetes type 2, controlled ([address]     Dyslipidemia     Hypothyroid     Obesity (BMI 30-39. 9)     Essential hypertension     Noncompliance with therapeutic plan     Tobacco abuse     Positive FIT (fecal immunochemical test)     Morbidly obese (HCC)

## 2020-11-10 ENCOUNTER — OFFICE VISIT (OUTPATIENT)
Dept: PRIMARY CARE CLINIC | Age: 65
End: 2020-11-10
Payer: MEDICARE

## 2020-11-10 VITALS
TEMPERATURE: 98.4 F | BODY MASS INDEX: 38.07 KG/M2 | HEART RATE: 83 BPM | WEIGHT: 223 LBS | SYSTOLIC BLOOD PRESSURE: 138 MMHG | DIASTOLIC BLOOD PRESSURE: 74 MMHG | HEIGHT: 64 IN | OXYGEN SATURATION: 96 %

## 2020-11-10 PROCEDURE — G8427 DOCREV CUR MEDS BY ELIG CLIN: HCPCS | Performed by: NURSE PRACTITIONER

## 2020-11-10 PROCEDURE — 99214 OFFICE O/P EST MOD 30 MIN: CPT | Performed by: NURSE PRACTITIONER

## 2020-11-10 PROCEDURE — 1123F ACP DISCUSS/DSCN MKR DOCD: CPT | Performed by: NURSE PRACTITIONER

## 2020-11-10 PROCEDURE — 4004F PT TOBACCO SCREEN RCVD TLK: CPT | Performed by: NURSE PRACTITIONER

## 2020-11-10 PROCEDURE — 3017F COLORECTAL CA SCREEN DOC REV: CPT | Performed by: NURSE PRACTITIONER

## 2020-11-10 PROCEDURE — G8417 CALC BMI ABV UP PARAM F/U: HCPCS | Performed by: NURSE PRACTITIONER

## 2020-11-10 PROCEDURE — G8484 FLU IMMUNIZE NO ADMIN: HCPCS | Performed by: NURSE PRACTITIONER

## 2020-11-10 PROCEDURE — G8400 PT W/DXA NO RESULTS DOC: HCPCS | Performed by: NURSE PRACTITIONER

## 2020-11-10 PROCEDURE — 1090F PRES/ABSN URINE INCON ASSESS: CPT | Performed by: NURSE PRACTITIONER

## 2020-11-10 PROCEDURE — 4040F PNEUMOC VAC/ADMIN/RCVD: CPT | Performed by: NURSE PRACTITIONER

## 2020-11-10 RX ORDER — VARENICLINE TARTRATE
KIT
Qty: 1 BOX | Refills: 0 | Status: SHIPPED
Start: 2020-11-10 | End: 2021-02-19

## 2020-11-10 ASSESSMENT — ENCOUNTER SYMPTOMS
VOMITING: 0
SORE THROAT: 0
SHORTNESS OF BREATH: 1
COUGH: 1
ABDOMINAL PAIN: 0
RHINORRHEA: 0
NAUSEA: 0
CONSTIPATION: 0
WHEEZING: 0
DIARRHEA: 0

## 2020-11-10 NOTE — PATIENT INSTRUCTIONS
Patient Education        Stopping Smoking: Care Instructions  Your Care Instructions     Cigarette smokers crave the nicotine in cigarettes. Giving it up is much harder than simply changing a habit. Your body has to stop craving the nicotine. It is hard to quit, but you can do it. There are many tools that people use to quit smoking. You may find that combining tools works best for you. There are several steps to quitting. First you get ready to quit. Then you get support to help you. After that, you learn new skills and behaviors to become a nonsmoker. For many people, a necessary step is getting and using medicine. Your doctor will help you set up the plan that best meets your needs. You may want to attend a smoking cessation program to help you quit smoking. When you choose a program, look for one that has proven success. Ask your doctor for ideas. You will greatly increase your chances of success if you take medicine as well as get counseling or join a cessation program.  Some of the changes you feel when you first quit tobacco are uncomfortable. Your body will miss the nicotine at first, and you may feel short-tempered and grumpy. You may have trouble sleeping or concentrating. Medicine can help you deal with these symptoms. You may struggle with changing your smoking habits and rituals. The last step is the tricky one: Be prepared for the smoking urge to continue for a time. This is a lot to deal with, but keep at it. You will feel better. Follow-up care is a key part of your treatment and safety. Be sure to make and go to all appointments, and call your doctor if you are having problems. It's also a good idea to know your test results and keep a list of the medicines you take. How can you care for yourself at home? · Ask your family, friends, and coworkers for support. You have a better chance of quitting if you have help and support.   · Join a support group, such as Nicotine Anonymous, for people who are trying to quit smoking. · Consider signing up for a smoking cessation program, such as the American Lung Association's Freedom from Smoking program.  · Get text messaging support. Go to the website at www.smokefree. gov to sign up for the [facility] program.  · Set a quit date. Pick your date carefully so that it is not right in the middle of a big deadline or stressful time. Once you quit, do not even take a puff. Get rid of all ashtrays and lighters after your last cigarette. Clean your house and your clothes so that they do not smell of smoke. · Learn how to be a nonsmoker. Think about ways you can avoid those things that make you reach for a cigarette. ? Avoid situations that put you at greatest risk for smoking. For some people, it is hard to have a drink with friends without smoking. For others, they might skip a coffee break with coworkers who smoke. ? Change your daily routine. Take a different route to work or eat a meal in a different place. · Cut down on stress. Calm yourself or release tension by doing an activity you enjoy, such as reading a book, taking a hot bath, or gardening. · Talk to your doctor or pharmacist about nicotine replacement therapy, which replaces the nicotine in your body. You still get nicotine but you do not use tobacco. Nicotine replacement products help you slowly reduce the amount of nicotine you need. These products come in several forms, many of them available over-the-counter:  ? Nicotine patches  ? Nicotine gum and lozenges  ? Nicotine inhaler  · Ask your doctor about bupropion (Wellbutrin) or varenicline (Chantix), which are prescription medicines. They do not contain nicotine. They help you by reducing withdrawal symptoms, such as stress and anxiety. · Some people find hypnosis, acupuncture, and massage helpful for ending the smoking habit. · Eat a healthy diet and get regular exercise.  Having healthy habits will help your body move past its craving for nicotine. · Be prepared to keep trying. Most people are not successful the first few times they try to quit. Do not get mad at yourself if you smoke again. Make a list of things you learned and think about when you want to try again, such as next week, next month, or next year. Where can you learn more? Go to https://3Jampesolitarioewbia.[employer]. org and sign in to your [employer] account. Enter P486 in the [employer] box to learn more about \"Stopping Smoking: Care Instructions. \"     If you do not have an account, please click on the \"Sign Up Now\" link. Current as of: March 12, 2020               Content Version: 12.6  © 2006-2020 [employer]. Care instructions adapted under license by [facility] ([facility]). If you have questions about a medical condition or this instruction, always ask your healthcare professional. [address] any warranty or liability for your use of this information.

## 2020-11-10 NOTE — PROGRESS NOTES
Name: Ebonie Do  : 1955         Chief Complaint:     Chief Complaint   Patient presents with    Nicotine Dependence     She is asking help with smoking cessation       History of Present Illness:      Ebonie Do is a 76 y.o.  female who presents with Nicotine Dependence (She is asking help with smoking cessation)      Scott Caraballo is here today for a routine office visit. Cigarette dependence-patient would like to stop smoking. She has tried patches and Wellbutrin in the past without success. She would like to try Chantix. She knows that she needs to stop due to the risk of lung cancer. Her also recently passed away from lung cancer and she wishes to stop as soon as possible. She does have a quit date in mind. Diastolic dysfunction-murmur seems to be a little louder. Last echo was over 2 years ago. We will repeat echo. See below for further comments. Congestive Heart Failure   Presents for follow-up visit. Associated symptoms include shortness of breath. Pertinent negatives include no abdominal pain, chest pain, chest pressure, claudication, edema, fatigue, muscle weakness, near-syncope, nocturia, orthopnea, palpitations, paroxysmal nocturnal dyspnea or unexpected weight change. The symptoms have been stable. Compliance with total regimen is 51-75%. Compliance problems include adherence to diet and adherence to exercise. Compliance with diet is 51-75%. Compliance with exercise is 26-50%. Compliance with medications is %.          Past Medical History:     Past Medical History:   Diagnosis Date    COPD [facility]chronic obstructive [facility] disease) [address] [address]     Hyperlipidemia     Hyperthyroidism     Hypothyroidism     Obesity     Osteoarthritis     Sleep apnea     Tobacco abuse     Type II or unspecified type diabetes mellitus without mention of complication, not stated as uncontrolled       Reviewed all health maintenance requirements and ordered appropriate tests  Health Maintenance Due   Topic Date Due    Annual Wellness Visit (AWV)  05/29/2019    Breast cancer screen  07/16/2020    Lipid screen  10/09/2020    Colon Cancer Screen FIT/FOBT  11/04/2020       Past Surgical History:     Past Surgical History:   Procedure Laterality Date    HEEL SPUR SURGERY      right heel    TONSILLECTOMY      TOOTH EXTRACTION      All of back teeth    TUBAL LIGATION          Medications:       Prior to Admission medications    Medication Sig Start Date End Date Taking?  Authorizing Provider   varenicline (CHANTIX STARTING MONTH PAK) 0.5 MG X 11 & 1 MG X 42 tablet Take by mouth. 11/10/20  Yes CYRUS Johnson CNP   hydrOXYzine (ATARAX) 25 MG tablet TAKE ONE TABLET BY MOUTH EVERY 8 HOURS AS NEEDED FOR ANXIETY 9/10/20  Yes CYRUS Johnson CNP   venlafaxine (EFFEXOR XR) 37.5 MG extended release capsule Take 1 capsule by mouth daily 6/25/20  Yes CYRUS Johnson CNP   lisinopril (PRINIVIL;ZESTRIL) 2.5 MG tablet TAKE ONE TABLET BY MOUTH DAILY 4/27/20  Yes CYRUS Johnson CNP   metFORMIN (GLUCOPHAGE) 1000 MG tablet TAKE ONE TABLET BY MOUTH TWICE A DAY WITH MEALS 4/27/20  Yes CYRUS Johnson CNP   levothyroxine (SYNTHROID) 175 MCG tablet TAKE ONE TABLET BY MOUTH DAILY 4/27/20  CYRUS Cotter CNP   atorvastatin (LIPITOR) 40 MG tablet TAKE ONE TABLET BY MOUTH DAILY 4/27/20  Yes CYRUS Johnson CNP   albuterol sulfate HFA (PROAIR HFA) 108 (90 Base) MCG/ACT inhaler Inhale 2 puffs into the lungs every 4-6 hours as needed for Wheezing 4/9/20  Yes CYRUS Johnson CNP   Blood Glucose Monitoring Suppl (TRUE METRIX METER) W/DEVICE KIT 1 each by Does not apply route daily 2/8/17  CYRUS Cotter CNP   glucose blood VI test strips (TRUE METRIX BLOOD GLUCOSE TEST) strip 1 each by In Vitro route daily 2/8/17  Yes CYRUS Johnson CNP   Lancets MISC 1 each by Does not apply route daily 2/8/17  Yes Anna Johnson, APRN - CNP   aspirin EC 81 MG EC tablet Take 81 mg by mouth daily. Yes Historical Provider, MD العليUVIA 100 MG tablet Take 100 mg by mouth daily 6/25/20   Historical Provider, MD   SITagliptin (JANUVIA) 100 MG tablet Take 1 tablet by mouth daily  Patient not taking: Reported on 11/10/2020 6/25/20   CYRUS Samano - CNP        Allergies:       Patient has no known allergies. Social History:     Tobacco:    reports that she has been smoking. She has a 40.00 pack-year smoking history. She has never used smokeless tobacco.  Alcohol:      reports no history of alcohol use. Drug Use:  reports no history of drug use. Family History:     Family History   Problem Relation Age of Onset   Tg [place] Cancer Father     Diabetes Sister     Diabetes Brother     Diabetes Paternal Grandmother        Review of Systems:     Positive and Negative as described in HPI    Review of Systems   Constitutional: Negative for chills, fatigue, fever and unexpected weight change. HENT: Negative for congestion, rhinorrhea and sore throat. Eyes: Negative for visual disturbance. Respiratory: Positive for cough and shortness of breath. Negative for wheezing. Cardiovascular: Negative for chest pain, palpitations, claudication and near-syncope. Gastrointestinal: Negative for abdominal pain, constipation, diarrhea, nausea and vomiting. Genitourinary: Negative for difficulty urinating, dysuria and nocturia. Musculoskeletal: Negative for gait problem, muscle weakness, neck pain and neck stiffness. Skin: Negative for rash. Neurological: Negative for dizziness, syncope, light-headedness and headaches. Physical Exam:   Vitals:  /74 (Site: Left Upper Arm, Position: Sitting, Cuff Size: Large Adult)   Pulse 83   Temp 98.4 °F (36.9 °C) (Temporal)   Ht 5' 4\" (1.626 m)   Wt 223 lb (101.2 kg)   SpO2 96%   BMI 38.28 kg/m²     Physical Exam  Vitals signs and nursing note reviewed. Constitutional:       General: She is not in acute distress.      Appearance: Normal appearance. She is obese. She is not ill-appearing. HENT:      Mouth/Throat:      Mouth: Mucous membranes are moist.      Pharynx: Posterior oropharyngeal erythema present. Eyes:      General: No scleral icterus. Conjunctiva/sclera: Conjunctivae normal.   Neck:      Musculoskeletal: Normal range of motion and neck supple. Cardiovascular:      Rate and Rhythm: Normal rate and regular rhythm. Heart sounds: Murmur present. Pulmonary:      Effort: Pulmonary effort is normal.      Breath sounds: Normal breath sounds. No wheezing. Abdominal:      General: Bowel sounds are normal. There is no distension. Palpations: Abdomen is soft. Tenderness: There is no abdominal tenderness. Musculoskeletal:      Right lower leg: No edema. Left lower leg: No edema. Skin:     General: Skin is warm and dry. Findings: No rash. Neurological:      Mental Status: She is alert and oriented to person, place, and time. Psychiatric:         Attention and Perception: Attention and perception normal.         Mood and Affect: Mood is depressed (mildly). Mood is not anxious. Speech: Speech normal.         Behavior: Behavior normal.         Thought Content: Thought content normal. Thought content does not include homicidal or suicidal ideation. Thought content does not include homicidal or suicidal plan.          Cognition and Memory: Cognition normal.         Judgment: Judgment normal.         Data:     Lab Results   Component Value Date     04/15/2020    K 4.1 04/15/2020     04/15/2020    CO2 25 04/15/2020    BUN 8 04/15/2020    CREATININE 0.60 04/15/2020    GLUCOSE 116 04/15/2020    GLUCOSE 103 05/24/2012    PROT 7.5 10/09/2019    LABALBU 3.9 10/09/2019    LABALBU 4.5 10/27/2011    BILITOT 0.35 10/09/2019    ALKPHOS 132 10/09/2019    AST 20 04/15/2020    ALT 22 04/15/2020     Lab Results   Component Value Date    WBC 9.0 03/23/2019    RBC 5.46 03/23/2019    RBC 4.83 05/24/2012    HGB 15.8 03/23/2019    HCT 48.4 03/23/2019    MCV 88.6 03/23/2019    MCH 28.9 03/23/2019    MCHC 32.6 03/23/2019    RDW 12.7 03/23/2019     03/23/2019     05/24/2012    MPV 9.5 03/23/2019     Lab Results   Component Value Date    TSH 1.15 04/15/2020     Lab Results   Component Value Date    CHOL 193 10/09/2019    HDL 42 10/09/2019    LABA1C 6.9 04/15/2020       Assessment/Plan:      Diagnosis Orders   1. Cigarette nicotine dependence without complication  varenicline (CHANTIX STARTING MONTH CASSIE) 0.5 MG X 11 & 1 MG X 42 tablet   2. Diastolic dysfunction  ECHO 2D WO Color Doppler Complete       1. Neil Valdez received counseling on the following healthy behaviors: nutrition, exercise, medication adherence and tobacco cessation  2. Patient given educational materials - see patient instructions  3. Was a self-tracking handout given in paper form or via [employer]? No  If yes, see orders or list here. 4.  Discussed use, benefit, and side effects of prescribed medications. Barriers to medication compliance addressed. All patient questions answered. Pt voiced understanding. 5.  Reviewed prior labs and health maintenance  6. Continue current medications, diet and exercise. Completed Refills   Requested Prescriptions     Signed Prescriptions Disp Refills    varenicline (CHANTIX STARTING MONTH CASSIE) 0.5 MG X 11 & 1 MG X 42 tablet 1 box 0     Sig: Take by mouth. Return if symptoms worsen or fail to improve.

## 2020-11-18 ENCOUNTER — OFFICE VISIT (OUTPATIENT)
Dept: PRIMARY CARE CLINIC | Age: 65
End: 2020-11-18
Payer: MEDICARE

## 2020-11-18 VITALS
HEIGHT: 64 IN | BODY MASS INDEX: 39.2 KG/M2 | RESPIRATION RATE: 22 BRPM | HEART RATE: 68 BPM | DIASTOLIC BLOOD PRESSURE: 70 MMHG | SYSTOLIC BLOOD PRESSURE: 138 MMHG | TEMPERATURE: 97.8 F | WEIGHT: 229.6 LBS

## 2020-11-18 PROCEDURE — G8484 FLU IMMUNIZE NO ADMIN: HCPCS | Performed by: NURSE PRACTITIONER

## 2020-11-18 PROCEDURE — 1123F ACP DISCUSS/DSCN MKR DOCD: CPT | Performed by: NURSE PRACTITIONER

## 2020-11-18 PROCEDURE — G0438 PPPS, INITIAL VISIT: HCPCS | Performed by: NURSE PRACTITIONER

## 2020-11-18 PROCEDURE — 3017F COLORECTAL CA SCREEN DOC REV: CPT | Performed by: NURSE PRACTITIONER

## 2020-11-18 PROCEDURE — 4040F PNEUMOC VAC/ADMIN/RCVD: CPT | Performed by: NURSE PRACTITIONER

## 2020-11-18 ASSESSMENT — PATIENT HEALTH QUESTIONNAIRE - PHQ9
SUM OF ALL RESPONSES TO PHQ9 QUESTIONS 1 & 2: 0
SUM OF ALL RESPONSES TO PHQ QUESTIONS 1-9: 0
1. LITTLE INTEREST OR PLEASURE IN DOING THINGS: 0
SUM OF ALL RESPONSES TO PHQ QUESTIONS 1-9: 0
2. FEELING DOWN, DEPRESSED OR HOPELESS: 0
SUM OF ALL RESPONSES TO PHQ QUESTIONS 1-9: 0

## 2020-11-18 ASSESSMENT — LIFESTYLE VARIABLES: HOW OFTEN DO YOU HAVE A DRINK CONTAINING ALCOHOL: 0

## 2020-11-18 NOTE — PROGRESS NOTES
Medicare Annual Wellness Visit  Name: Amber Mackey Date: 2020   MRN: [id number] Sex: Female   Age: 76 y.o. Ethnicity: Non-/Non    : 1955 Race: Cr Lopez is here for Medicare AWV    Screenings for behavioral, psychosocial and functional/safety risks, and cognitive dysfunction are all negative except as indicated below. These results, as well as other patient data from the [address] form, are documented in Flowsheets linked to this Encounter. No Known Allergies      Prior to Visit Medications    Medication Sig Taking? Authorizing Provider   varenicline (CHANTIX STARTING MONTH ) 0.5 MG X 11 & 1 MG X 42 tablet Take by mouth.  Yes Debbrah Kayser Might, APRN - CNP   hydrOXYzine (ATARAX) 25 MG tablet TAKE ONE TABLET BY MOUTH EVERY 8 HOURS AS NEEDED FOR ANXIETY Yes Debbrah Kayser Might, APRN - CNP   venlafaxine (EFFEXOR XR) 37.5 MG extended release capsule Take 1 capsule by mouth daily Yes Debbrah Kayser Might, APRN - CNP   SITagliptin (JANUVIA) 100 MG tablet Take 1 tablet by mouth daily Yes Debbrah Kayser Might, APRN - CNP   lisinopril (PRINIVIL;ZESTRIL) 2.5 MG tablet TAKE ONE TABLET BY MOUTH DAILY Yes Debbrah Kayser Might, APRN - CNP   metFORMIN (GLUCOPHAGE) 1000 MG tablet TAKE ONE TABLET BY MOUTH TWICE A DAY WITH MEALS Yes Debbrah Kayser Might, APRN - CNP   levothyroxine (SYNTHROID) 175 MCG tablet TAKE ONE TABLET BY MOUTH DAILY Yes Debbrah Kayser Might, APRN - CNP   atorvastatin (LIPITOR) 40 MG tablet TAKE ONE TABLET BY MOUTH DAILY Yes Debbrah Kayser Might, APRN - CNP   albuterol sulfate HFA (PROAIR HFA) 108 (90 Base) MCG/ACT inhaler Inhale 2 puffs into the lungs every 4-6 hours as needed for Wheezing Yes Debbrah Kayser Might, APRN - CNP   Blood Glucose Monitoring Suppl (TRUE METRIX METER) W/DEVICE KIT 1 each by Does not apply route daily  Patient taking differently: 1 each by Does not apply route as needed  Yes Debbrah Kayser Might, APRN - CNP   glucose blood VI test strips (TRUE METRIX BLOOD GLUCOSE TEST) strip 1 each by In Vitro route daily  Patient taking differently: 1 each by In Vitro route as needed  Yes CYRUS Rose CNP   Lancets MISC 1 each by Does not apply route daily Yes CYRUS Rose CNP   aspirin EC 81 MG EC tablet Take 81 mg by mouth daily. Yes Historical Provider, MD         Past Medical History:   Diagnosis Date    COPD (chronic obstructive pulmonary disease) ([address]     Hyperlipidemia     Hyperthyroidism     Hypothyroidism     Obesity     Osteoarthritis     Sleep apnea     Tobacco abuse     Type II or unspecified type diabetes mellitus without mention of complication, not stated as uncontrolled        Past Surgical History:   Procedure Laterality Date    HEEL SPUR SURGERY      right heel    TONSILLECTOMY      TOOTH EXTRACTION      All of back teeth    TUBAL LIGATION           Family History   Problem Relation Age of Onset    Cancer Father     Diabetes Sister     Diabetes Brother     Diabetes Paternal Grandmother        CareTeam (Including outside providers/suppliers regularly involved in providing care):   Patient Care Team:  CYRUS Rose CNP as PCP - General (Nurse Practitioner)  CYRUS Castellanos CNP as PCP - [facility] Empaneled Provider  Payton Johnson RN as Nurse Navigator    Wt Readings from Last 3 Encounters:   11/18/20 229 lb 9.6 oz (104.1 kg)   11/10/20 223 lb (101.2 kg)   07/02/20 225 lb 3.2 oz (102.2 kg)     Vitals:    11/18/20 1535   BP: 138/70   Site: Left Upper Arm   Pulse: 68   Resp: 22   Temp: 97.8 °F (36.6 °C)   TempSrc: Temporal   Weight: 229 lb 9.6 oz (104.1 kg)   Height: 5' 4\" (1.626 m)     Body mass index is 39.41 kg/m². Based upon direct observation of the patient, evaluation of cognition reveals recent and remote memory intact.     General Appearance: alert and oriented to person, place and time and obese  Skin: warm and dry  ENT: oropharynx clear and moist with normal mucous membranes  Pulmonary/Chest: decreased breath sounds noted- mildly diminished bases bilaterally  Cardiovascular: normal rate and regular rhythm  Abdomen: soft, non-tender  Extremities: no edema    Patient's complete Health Risk Assessment and screening values have been reviewed and are found in Flowsheets. The following problems were reviewed today and where indicated follow up appointments were made and/or referrals ordered. Positive Risk Factor Screenings with Interventions:     Cognitive: Words recalled: 0 Words Recalled  Total Score Interpretation: Positive Mini-Cog  Did the patient refuse to take the cognition test?: No  Cognitive Impairment Interventions:  · Patient declines any further evaluation/treatment for cognitive impairment    Substance History:  Social History     Tobacco History     Smoking Status  Current Every Day Smoker Smoking Frequency  1 pack/day for 40 years (40 pk yrs)    Smokeless Tobacco Use  Never Used          Alcohol History     Alcohol Use Status  No          Drug Use     Drug Use Status  No          Sexual Activity     Sexually Active  Never               Alcohol Screening:       A score of 8 or more is associated with harmful or hazardous drinking. A score of 13 or more in women, and 15 or more in men, is likely to indicate alcohol dependence. Substance Abuse Interventions:  · Tobacco abuse:  on Chantix    General Health and ACP:  General  In general, how would you say your health is?: Good  In the past 7 days, have you experienced any of the following?  New or Increased Pain, New or Increased Fatigue, Loneliness, Social Isolation, Stress or Anger?: None of These  Do you get the social and emotional support that you need?: Yes  Do you have a Living Will?: (!) No  Advance Directives     Power of  Living Will ACP-Advance Directive ACP-Power of     Not on File Not on File Filed [address] Risk Interventions:  · No Living Will: Patient declines ACP discussion/assistance    Health Habits/Nutrition:  Health Habits/Nutrition  Do you exercise for at least 20 minutes 2-3 times per week?: (!) No  Have you lost any weight without trying in the past 3 months?: (!) Yes  Do you eat fewer than 2 meals per day?: (!) Yes  Have you seen a dentist within the past year?: (!) No  Body mass index: (!) 39.41  Health Habits/Nutrition Interventions:  · Inadequate physical activity:  educational materials provided to promote increased physical activity  · Nutritional issues:  educational materials to promote weight loss provided  · Dental exam overdue:  patient encouraged to make appointment with his/her dentist    Safety:  Safety  Do you have working smoke detectors?: Yes  Have all throw rugs been removed or fastened?: Yes  Do you have non-slip mats or surfaces in all bathtubs/showers?: (!) No  Do all of your stairways have a railing or banister?: (!) No  Are your doorways, halls and stairs free of clutter?: Yes  Do you always fasten your seatbelt when you are in a car?: Yes  Safety Interventions:  · Home safety tips provided    Personalized Preventive Plan   Current Health Maintenance Status  Immunization History   Administered Date(s) Administered    Pneumococcal Polysaccharide ([id number]) 07/08/2016    Zoster Live (Zostavax) 07/08/2016        Health Maintenance   Topic Date Due    Annual Wellness Visit (AWV)  05/29/2019    Breast cancer screen  07/16/2020    Lipid screen  10/09/2020    Colon Cancer Screen FIT/FOBT  11/04/2020    DEXA (modify frequency per FRAX score)  11/18/2020 (Originally 1/2/2009)    Diabetic retinal exam  06/25/2021 (Originally 11/6/2013)    DTaP/Tdap/Td vaccine (1 - Tdap) 06/25/2021 (Originally 1/2/1973)    Shingles Vaccine (2 of 3) 06/25/2021 (Originally 9/2/2016)    Flu vaccine (1) 10/01/2021 (Originally 9/1/2020)    A1C test (Diabetic or Prediabetic)  04/15/2021    Diabetic microalbuminuria test  04/15/2021    TSH testing  04/15/2021    Potassium monitoring  04/15/2021    Creatinine monitoring  04/15/2021    Diabetic foot exam  06/25/2021    Pneumococcal 65+ years Vaccine (1 of 1 - PPSV23) 07/08/2021    Low dose CT lung screening  07/21/2021    Hepatitis C screen  Completed    Hepatitis A vaccine  Aged Out    Hib vaccine  Aged Out    Meningococcal (ACWY) vaccine  Aged Out     Recommendations for Nav[employer]pert Due: see orders and patient instructions/AVS.  . Recommended screening schedule for the next 5-10 years is provided to the patient in written form: see Patient Instructions/AVS.    Ana Paula Arita was seen today for medicare awv. Diagnoses and all orders for this visit:    Routine general medical examination at a health care facility    Other screening mammogram  -     HOLLIE LIANNA DIGITAL SCREEN BILATERAL;  Future

## 2020-11-20 ENCOUNTER — HOSPITAL ENCOUNTER (OUTPATIENT)
Dept: NON INVASIVE DIAGNOSTICS | Age: 65
Discharge: HOME | End: 2020-11-20
Payer: MEDICARE

## 2020-11-20 LAB
LV EF: 60 %
LVEF MODALITY: NORMAL

## 2020-11-20 PROCEDURE — 93306 TTE W/DOPPLER COMPLETE: CPT

## 2020-11-23 ENCOUNTER — TELEPHONE (OUTPATIENT)
Dept: PRIMARY CARE CLINIC | Age: 65
End: 2020-11-23

## 2020-12-12 ENCOUNTER — HOSPITAL ENCOUNTER (EMERGENCY)
Age: 65
Discharge: HOME | End: 2020-12-12
Attending: EMERGENCY MEDICINE
Payer: MEDICARE

## 2020-12-12 VITALS
HEIGHT: 65 IN | HEART RATE: 72 BPM | TEMPERATURE: 98.8 F | BODY MASS INDEX: 38.32 KG/M2 | RESPIRATION RATE: 16 BRPM | WEIGHT: 230 LBS | DIASTOLIC BLOOD PRESSURE: 51 MMHG | SYSTOLIC BLOOD PRESSURE: 115 MMHG | OXYGEN SATURATION: 95 %

## 2020-12-12 PROCEDURE — 6370000000 HC RX 637 (ALT 250 FOR IP): Performed by: EMERGENCY MEDICINE

## 2020-12-12 PROCEDURE — 99283 EMERGENCY DEPT VISIT LOW MDM: CPT

## 2020-12-12 RX ORDER — OXYCODONE HYDROCHLORIDE AND ACETAMINOPHEN 5; 325 MG/1; MG/1
2 TABLET ORAL ONCE
Status: COMPLETED | OUTPATIENT
Start: 2020-12-12 | End: 2020-12-12

## 2020-12-12 RX ORDER — OXYCODONE HYDROCHLORIDE AND ACETAMINOPHEN 5; 325 MG/1; MG/1
1 TABLET ORAL EVERY 6 HOURS PRN
Qty: 10 TABLET | Refills: 0 | Status: SHIPPED | OUTPATIENT
Start: 2020-12-12 | End: 2020-12-15

## 2020-12-12 RX ADMIN — OXYCODONE HYDROCHLORIDE AND ACETAMINOPHEN 2 TABLET: 5; 325 TABLET ORAL at 07:31

## 2020-12-12 ASSESSMENT — PAIN DESCRIPTION - LOCATION: LOCATION: NECK

## 2020-12-12 ASSESSMENT — PAIN SCALES - GENERAL
PAINLEVEL_OUTOF10: 10
PAINLEVEL_OUTOF10: 10

## 2020-12-12 ASSESSMENT — PAIN - FUNCTIONAL ASSESSMENT: PAIN_FUNCTIONAL_ASSESSMENT: 0-10

## 2020-12-12 ASSESSMENT — PAIN DESCRIPTION - PAIN TYPE: TYPE: ACUTE PAIN;CHRONIC PAIN

## 2020-12-12 ASSESSMENT — PAIN DESCRIPTION - ORIENTATION: ORIENTATION: POSTERIOR

## 2020-12-12 NOTE — ED PROVIDER NOTES
[address] [place] [address] ED  [place] [address]   Chief Complaint   Patient presents with    Neck Pain     Posterior neck, onset last night. Pt report history of neck pain. Denies injury        HPI   Rufus Merritt is a 76 y.o. female who presents with back pain. The context is that it has been on and off for a month. She woke up with it a month ago. She notes that she has not had any fever or altered mental status or weird rashes. Just hurts whenever she moves her neck in any direction including rotationally. She finds her life is best if she does look straight ahead at this point. She is known to have some history of neck problems but there were no injuries associated with this issue. Papo Morales REVIEW OF SYSTEMS   Musculoskeletal: +neckpain  : No dysuria or hematuria  GI: No abdominal pain or vomiting  General: No fevers or chills  Neurologic: No bowel or bladder incontinence, No saddle anesthesia, No leg weakness  Review of systems otherwise negative. PAST MEDICAL & SURGICAL HISTORY   Past Medical History:   Diagnosis Date    COPD (chronic obstructive pulmonary disease) ([facility] [address]     Hyperlipidemia     Hyperthyroidism     Hypothyroidism     Obesity     Osteoarthritis     Sleep apnea     Tobacco abuse     Type II or unspecified type diabetes mellitus without mention of complication, not stated as uncontrolled      Past Surgical History:   Procedure Laterality Date    HEEL SPUR SURGERY      right heel    TONSILLECTOMY      TOOTH EXTRACTION      All of back teeth    TUBAL LIGATION        CURRENT MEDICATIONS   Current Outpatient Rx   Medication Sig Dispense Refill    oxyCODONE-acetaminophen (PERCOCET) 5-325 MG per tablet Take 1 tablet by mouth every 6 hours as needed for Pain for up to 3 days. Intended supply: 3 days.  Take lowest dose possible to manage pain 10 tablet 0    venlafaxine (EFFEXOR XR) 37.5 MG extended release capsule Take 1 capsule by mouth daily 90 capsule 1  SITagliptin (JANUVIA) 100 MG tablet Take 1 tablet by mouth daily 90 tablet 3    lisinopril (PRINIVIL;ZESTRIL) 2.5 MG tablet TAKE ONE TABLET BY MOUTH DAILY (Patient taking differently: Take 2.5 mg by mouth daily ) 90 tablet 2    metFORMIN (GLUCOPHAGE) 1000 MG tablet TAKE ONE TABLET BY MOUTH TWICE A DAY WITH MEALS (Patient taking differently: Take 1,000 mg by mouth 2 times daily (with meals) ) 180 tablet 2    levothyroxine (SYNTHROID) 175 MCG tablet TAKE ONE TABLET BY MOUTH DAILY (Patient taking differently: Take 175 mcg by mouth Daily ) 90 tablet 2    atorvastatin (LIPITOR) 40 MG tablet TAKE ONE TABLET BY MOUTH DAILY 90 tablet 2    albuterol sulfate HFA (PROAIR HFA) 108 (90 Base) MCG/ACT inhaler Inhale 2 puffs into the lungs every 4-6 hours as needed for Wheezing 1 Inhaler 0    aspirin EC 81 MG EC tablet Take 81 mg by mouth daily.  varenicline (CHANTIX STARTING MONTH PAK) 0.5 MG X 11 & 1 MG X 42 tablet Take by mouth. (Patient taking differently: Take 0.5 mg by mouth Take by mouth.) 1 box 0    hydrOXYzine (ATARAX) 25 MG tablet TAKE ONE TABLET BY MOUTH EVERY 8 HOURS AS NEEDED FOR ANXIETY (Patient taking differently: Take 25 mg by mouth every 8 hours as needed ) 90 tablet 0    Blood Glucose Monitoring Suppl (TRUE METRIX METER) W/DEVICE KIT 1 each by Does not apply route daily (Patient taking differently: 1 each by Does not apply route as needed ) 1 kit 0    glucose blood VI test strips (TRUE METRIX BLOOD GLUCOSE TEST) strip 1 each by In Vitro route daily (Patient taking differently: 1 each by In Vitro route as needed ) 100 each 3    Lancets MISC 1 each by Does not apply route daily 100 each 3      ALLERGIES   No Known Allergies   SOCIAL HISTORY   Social History     Socioeconomic History    Marital status:       Spouse name: None    Number of children: None    Years of education: None    Highest education level: None   Occupational History    None   Social Needs    Financial resource her doctor for further treatment plan. FINAL IMPRESSION   1.  Strain of neck muscle, initial encounter        PLAN:   Outpatient treatment, follow-up, and discharge instructions (see EMR)      Electronically signed by: Nicolle Serrano MD, 12/12/2020 7:29 AM           Nicolle Serrano MD  12/12/20 0744

## 2020-12-14 ENCOUNTER — TELEPHONE (OUTPATIENT)
Dept: PRIMARY CARE CLINIC | Age: 65
End: 2020-12-14

## 2020-12-14 NOTE — TELEPHONE ENCOUNTER
[facility] Initial Follow Up Call    Outreach made within 2 business days of discharge: Yes    Patient: Basil Anthony Patient : 1955   MRN: [id number]  Reason for Admission: There are no discharge diagnoses documented for the most recent discharge. Discharge Date: 20       Spoke with: 2020 @ 3:52pm Called patient and left message for her to call office re: recent ER visit. Cf  2020 @ 4:11pm Called patient for the second time, re: recent ER visit, message left for patient to call office. cf     Discharge department/facility:  ER    TCM Interactive Patient Contact:  Was patient able to fill all prescriptions:   Was patient instructed to bring all medications to the follow-up visit:   Is patient taking all medications as directed in the discharge summary?    Does patient understand their discharge instructions:   Does patient have questions or concerns that need addressed prior to 7-14 day follow up office visit:     Scheduled appointment with PCP within 7-14 days    Follow Up  Future Appointments   Date Time Provider Christopher Petersen   2020 10:00 AM Cheryle Huh Might, APRN - CNP [facility]   2021 11:30 AM [facility]   2021  2:00 PM Cheryle Huh Might, APRN - CNP [facility]       Denise Gamez

## 2020-12-28 ENCOUNTER — TELEPHONE (OUTPATIENT)
Dept: PRIMARY CARE CLINIC | Age: 65
End: 2020-12-28

## 2020-12-28 ENCOUNTER — OFFICE VISIT (OUTPATIENT)
Dept: PRIMARY CARE CLINIC | Age: 65
End: 2020-12-28
Payer: MEDICARE

## 2020-12-28 VITALS
HEART RATE: 86 BPM | SYSTOLIC BLOOD PRESSURE: 136 MMHG | OXYGEN SATURATION: 99 % | HEIGHT: 65 IN | WEIGHT: 228 LBS | TEMPERATURE: 96.7 F | RESPIRATION RATE: 18 BRPM | DIASTOLIC BLOOD PRESSURE: 86 MMHG | BODY MASS INDEX: 37.99 KG/M2

## 2020-12-28 LAB
CONTROL: NORMAL
HEMOCCULT STL QL: NEGATIVE

## 2020-12-28 PROCEDURE — 2022F DILAT RTA XM EVC RTNOPTHY: CPT | Performed by: NURSE PRACTITIONER

## 2020-12-28 PROCEDURE — 3044F HG A1C LEVEL LT 7.0%: CPT | Performed by: NURSE PRACTITIONER

## 2020-12-28 PROCEDURE — G8400 PT W/DXA NO RESULTS DOC: HCPCS | Performed by: NURSE PRACTITIONER

## 2020-12-28 PROCEDURE — G8484 FLU IMMUNIZE NO ADMIN: HCPCS | Performed by: NURSE PRACTITIONER

## 2020-12-28 PROCEDURE — 3017F COLORECTAL CA SCREEN DOC REV: CPT | Performed by: NURSE PRACTITIONER

## 2020-12-28 PROCEDURE — 1123F ACP DISCUSS/DSCN MKR DOCD: CPT | Performed by: NURSE PRACTITIONER

## 2020-12-28 PROCEDURE — G8427 DOCREV CUR MEDS BY ELIG CLIN: HCPCS | Performed by: NURSE PRACTITIONER

## 2020-12-28 PROCEDURE — G8417 CALC BMI ABV UP PARAM F/U: HCPCS | Performed by: NURSE PRACTITIONER

## 2020-12-28 PROCEDURE — 4004F PT TOBACCO SCREEN RCVD TLK: CPT | Performed by: NURSE PRACTITIONER

## 2020-12-28 PROCEDURE — 1090F PRES/ABSN URINE INCON ASSESS: CPT | Performed by: NURSE PRACTITIONER

## 2020-12-28 PROCEDURE — 99214 OFFICE O/P EST MOD 30 MIN: CPT | Performed by: NURSE PRACTITIONER

## 2020-12-28 PROCEDURE — 82274 ASSAY TEST FOR BLOOD FECAL: CPT | Performed by: NURSE PRACTITIONER

## 2020-12-28 PROCEDURE — 4040F PNEUMOC VAC/ADMIN/RCVD: CPT | Performed by: NURSE PRACTITIONER

## 2020-12-28 RX ORDER — METFORMIN HYDROCHLORIDE 500 MG/1
1000 TABLET, EXTENDED RELEASE ORAL
Qty: 180 TABLET | Refills: 3 | Status: SHIPPED | OUTPATIENT
Start: 2020-12-28 | End: 2021-12-28 | Stop reason: SDUPTHER

## 2020-12-28 RX ORDER — LISINOPRIL 2.5 MG/1
TABLET ORAL
Qty: 90 TABLET | Refills: 3 | Status: SHIPPED | OUTPATIENT
Start: 2020-12-28 | End: 2021-12-28 | Stop reason: SDUPTHER

## 2020-12-28 RX ORDER — VENLAFAXINE HYDROCHLORIDE 37.5 MG/1
37.5 CAPSULE, EXTENDED RELEASE ORAL DAILY
Qty: 90 CAPSULE | Refills: 3 | Status: SHIPPED | OUTPATIENT
Start: 2020-12-28 | End: 2021-12-28 | Stop reason: SDUPTHER

## 2020-12-28 RX ORDER — LEVOTHYROXINE SODIUM 175 UG/1
TABLET ORAL
Qty: 90 TABLET | Refills: 3 | Status: SHIPPED | OUTPATIENT
Start: 2020-12-28 | End: 2021-12-28 | Stop reason: SDUPTHER

## 2020-12-28 RX ORDER — ATORVASTATIN CALCIUM 40 MG/1
TABLET, FILM COATED ORAL
Qty: 90 TABLET | Refills: 3 | Status: SHIPPED | OUTPATIENT
Start: 2020-12-28 | End: 2021-12-28 | Stop reason: SDUPTHER

## 2020-12-28 ASSESSMENT — ENCOUNTER SYMPTOMS
NAUSEA: 0
RHINORRHEA: 0
ABDOMINAL PAIN: 0
WHEEZING: 0
SORE THROAT: 0
SHORTNESS OF BREATH: 0
COUGH: 0
VOMITING: 0
CONSTIPATION: 0
DIARRHEA: 0

## 2020-12-28 NOTE — PROGRESS NOTES
problem is controlled. Recent lipid tests were reviewed and are normal. Exacerbating diseases include diabetes, hypothyroidism and obesity. She has no history of chronic renal disease, liver disease or nephrotic syndrome. Factors aggravating her hyperlipidemia include fatty foods and smoking. Pertinent negatives include no chest pain, leg pain or shortness of breath. Current antihyperlipidemic treatment includes statins. The current treatment provides moderate improvement of lipids. Compliance problems include adherence to exercise and adherence to diet. Risk factors for coronary artery disease include diabetes mellitus, hypertension, obesity, family history, dyslipidemia, stress, a sedentary lifestyle and post-menopausal.         Past Medical History:     Past Medical History:   Diagnosis Date    COPD (chronic obstructive pulmonary disease) ([facility] [address]     Hyperlipidemia     Hyperthyroidism     Hypothyroidism     Obesity     Osteoarthritis     Sleep apnea     Tobacco abuse     Type II or unspecified type diabetes mellitus without mention of complication, not stated as uncontrolled       Reviewed all health maintenance requirements and ordered appropriate tests  Health Maintenance Due   Topic Date Due    DEXA (modify frequency per FRAX score)  01/02/2009    Breast cancer screen  07/16/2020    Lipid screen  10/09/2020    Colon Cancer Screen FIT/FOBT  11/04/2020       Past Surgical History:     Past Surgical History:   Procedure Laterality Date    HEEL SPUR SURGERY      right heel    TONSILLECTOMY      TOOTH EXTRACTION      All of back teeth    TUBAL LIGATION          Medications:       Prior to Admission medications    Medication Sig Start Date End Date Taking?  Authorizing Provider   lisinopril (PRINIVIL;ZESTRIL) 2.5 MG tablet TAKE ONE TABLET BY MOUTH DAILY 12/28/20  Yes CYRUS Tate - CNP   levothyroxine (SYNTHROID) 175 MCG tablet TAKE ONE TABLET BY MOUTH DAILY 12/28/20  Yes CYRUS Joseph - CNP   venlafaxine (EFFEXOR XR) 37.5 MG extended release capsule Take 1 capsule by mouth daily 12/28/20  Yes CYRUS Mohr CNP   atorvastatin (LIPITOR) 40 MG tablet TAKE ONE TABLET BY MOUTH DAILY 12/28/20  Yes CYRUS Mohr - CNP   metFORMIN (GLUCOPHAGE-XR) 500 MG extended release tablet Take 2 tablets by mouth daily (with breakfast) 12/28/20  Yes CYRUS Mohr CNP   hydrOXYzine (ATARAX) 25 MG tablet TAKE ONE TABLET BY MOUTH EVERY 8 HOURS AS NEEDED FOR ANXIETY  Patient taking differently: Take 25 mg by mouth every 8 hours as needed  9/10/20  Yes CYRUS Mohr CNP   SITagliptin (JANUVIA) 100 MG tablet Take 1 tablet by mouth daily 6/25/20  Yes CYRUS Mohr CNP   albuterol sulfate HFA (PROAIR HFA) 108 (90 Base) MCG/ACT inhaler Inhale 2 puffs into the lungs every 4-6 hours as needed for Wheezing 4/9/20  Yes CYRUS Mohr CNP   Blood Glucose Monitoring Suppl (TRUE METRIX METER) W/DEVICE KIT 1 each by Does not apply route daily  Patient taking differently: 1 each by Does not apply route as needed  2/8/17  Yes CYRUS Mohr CNP   glucose blood VI test strips (TRUE METRIX BLOOD GLUCOSE TEST) strip 1 each by In Vitro route daily  Patient taking differently: 1 each by In Vitro route as needed  2/8/17  Yes CYRUS Mohr CNP   Lancets MISC 1 each by Does not apply route daily 2/8/17  Yes CYRUS Mohr CNP   aspirin EC 81 MG EC tablet Take 81 mg by mouth daily. Yes Historical Provider, MD   varenicline (CHANTIX STARTING MONTH PAK) 0.5 MG X 11 & 1 MG X 42 tablet Take by mouth. Patient not taking: Reported on 12/28/2020 11/10/20   CYRUS Mohr CNP        Allergies:       Patient has no known allergies. Social History:     Tobacco:    reports that she has been smoking. She has a 40.00 pack-year smoking history. She has never used smokeless tobacco.  Alcohol:      reports no history of alcohol use.   Drug Use:  reports no history of drug use.    Family History:     Family History   Problem Relation Age of Onset   Edna Jenkins Cancer Father     Diabetes Sister     Diabetes Brother     Diabetes Paternal Grandmother        Review of Systems:     Positive and Negative as described in HPI    Review of Systems   Constitutional: Negative for chills, fatigue and fever. HENT: Negative for congestion, rhinorrhea and sore throat. Eyes: Negative for visual disturbance. Respiratory: Negative for cough, shortness of breath and wheezing. Cardiovascular: Negative for chest pain and palpitations. Gastrointestinal: Negative for abdominal pain, constipation, diarrhea, nausea and vomiting. Endocrine: Negative for polydipsia, polyphagia and polyuria. Genitourinary: Negative for difficulty urinating and dysuria. Musculoskeletal: Negative for gait problem, neck pain and neck stiffness. Skin: Negative for rash. Neurological: Negative for dizziness, syncope, light-headedness and headaches. Physical Exam:   Vitals:  /86 (Site: Left Upper Arm, Position: Sitting, Cuff Size: Large Adult)   Pulse 86   Temp 96.7 °F (35.9 °C) (Temporal)   Resp 18   Ht 5' 5\" (1.651 m)   Wt 228 lb (103.4 kg)   SpO2 99%   BMI 37.94 kg/m²     Physical Exam  Vitals signs and nursing note reviewed. Constitutional:       General: She is not in acute distress. Appearance: Normal appearance. She is obese. She is not ill-appearing. HENT:      Mouth/Throat:      Mouth: Mucous membranes are moist.   Eyes:      General: No scleral icterus. Conjunctiva/sclera: Conjunctivae normal.   Neck:      Musculoskeletal: Normal range of motion and neck supple. Cardiovascular:      Rate and Rhythm: Normal rate and regular rhythm. Heart sounds: No murmur. Pulmonary:      Effort: Pulmonary effort is normal.      Breath sounds: Normal breath sounds. No wheezing. Abdominal:      General: Bowel sounds are normal. There is no distension. Palpations: Abdomen is soft. Tenderness: There is no abdominal tenderness. Musculoskeletal:      Right lower leg: No edema. Left lower leg: No edema. Skin:     General: Skin is warm and dry. Findings: No rash. Neurological:      Mental Status: She is alert and oriented to person, place, and time. Psychiatric:         Mood and Affect: Mood normal.         Behavior: Behavior normal.         Data:     Lab Results   Component Value Date     04/15/2020    K 4.1 04/15/2020     04/15/2020    CO2 25 04/15/2020    BUN 8 04/15/2020    CREATININE 0.60 04/15/2020    GLUCOSE 116 04/15/2020    GLUCOSE 103 05/24/2012    PROT 7.5 10/09/2019    LABALBU 3.9 10/09/2019    LABALBU 4.5 10/27/2011    BILITOT 0.35 10/09/2019    ALKPHOS 132 10/09/2019    AST 20 04/15/2020    ALT 22 04/15/2020     Lab Results   Component Value Date    WBC 9.0 03/23/2019    RBC 5.46 03/23/2019    RBC 4.83 05/24/2012    HGB 15.8 03/23/2019    HCT 48.4 03/23/2019    MCV 88.6 03/23/2019    MCH 28.9 03/23/2019    MCHC 32.6 03/23/2019    RDW 12.7 03/23/2019     03/23/2019     05/24/2012    MPV 9.5 03/23/2019     Lab Results   Component Value Date    TSH 1.15 04/15/2020     Lab Results   Component Value Date    CHOL 193 10/09/2019    HDL 42 10/09/2019    LABA1C 6.9 04/15/2020       Assessment/Plan:      Diagnosis Orders   1. Controlled type 2 diabetes mellitus without complication, without long-term current use of insulin (HCC)  metFORMIN (GLUCOPHAGE-XR) 500 MG extended release tablet   2. Dyslipidemia  atorvastatin (LIPITOR) 40 MG tablet   3. Congenital hypothyroidism without goiter  levothyroxine (SYNTHROID) 175 MCG tablet       1. Chriss Mansfield received counseling on the following healthy behaviors: nutrition, exercise and medication adherence  2. Patient given educational materials - see patient instructions  3. Was a self-tracking handout given in paper form or via [employer]? No  If yes, see orders or list here.   4.  Discussed use, benefit, and side effects of prescribed medications. Barriers to medication compliance addressed. All patient questions answered. Pt voiced understanding. 5.  Reviewed prior labs and health maintenance  6. Continue current medications, diet and exercise. Completed Refills   Requested Prescriptions     Signed Prescriptions Disp Refills    lisinopril (PRINIVIL;ZESTRIL) 2.5 MG tablet 90 tablet 3     Sig: TAKE ONE TABLET BY MOUTH DAILY    levothyroxine (SYNTHROID) 175 MCG tablet 90 tablet 3     Sig: TAKE ONE TABLET BY MOUTH DAILY    venlafaxine (EFFEXOR XR) 37.5 MG extended release capsule 90 capsule 3     Sig: Take 1 capsule by mouth daily    atorvastatin (LIPITOR) 40 MG tablet 90 tablet 3     Sig: TAKE ONE TABLET BY MOUTH DAILY    metFORMIN (GLUCOPHAGE-XR) 500 MG extended release tablet 180 tablet 3     Sig: Take 2 tablets by mouth daily (with breakfast)         Return in about 6 months (around 6/28/2021) for Check up- A1c in office.

## 2020-12-28 NOTE — TELEPHONE ENCOUNTER
----- Message from [address] [place] [address], CYRUS - CNP sent at 12/28/2020 12:04 PM EST -----  Results are normal, please call patient and make them aware.

## 2020-12-28 NOTE — PROGRESS NOTES
Pt. Did have positive FIT last year, she was referred for Colonoscopy, was scheduled, then declined to have scope.  FYI

## 2020-12-29 ENCOUNTER — HOSPITAL ENCOUNTER (OUTPATIENT)
Age: 65
Discharge: HOME | End: 2020-12-29
Payer: MEDICARE

## 2020-12-29 LAB
ABSOLUTE EOS #: 0.26 K/UL (ref 0–0.44)
ABSOLUTE IMMATURE GRANULOCYTE: 0.04 K/UL (ref 0–0.3)
ABSOLUTE LYMPH #: 2.23 K/UL (ref 1.1–3.7)
ABSOLUTE MONO #: 0.74 K/UL (ref 0.1–1.2)
ALT SERPL-CCNC: 15 U/L (ref 5–33)
ANION GAP SERPL CALCULATED.3IONS-SCNC: 7 MMOL/L (ref 9–17)
AST SERPL-CCNC: 15 U/L
BASOPHILS # BLD: 1 % (ref 0–2)
BASOPHILS ABSOLUTE: 0.09 K/UL (ref 0–0.2)
BUN BLDV-MCNC: 8 MG/DL (ref 8–23)
BUN/CREAT BLD: 14 (ref 9–20)
CALCIUM SERPL-MCNC: 9.5 MG/DL (ref 8.6–10.4)
CHLORIDE BLD-SCNC: 101 MMOL/L (ref 98–107)
CHOLESTEROL/HDL RATIO: 6.6
CHOLESTEROL: 239 MG/DL
CO2: 28 MMOL/L (ref 20–31)
CREAT SERPL-MCNC: 0.57 MG/DL (ref 0.5–0.9)
CREATININE URINE: 38.2 MG/DL (ref 28–217)
DIFFERENTIAL TYPE: ABNORMAL
EOSINOPHILS RELATIVE PERCENT: 3 % (ref 1–4)
ESTIMATED AVERAGE GLUCOSE: 160 MG/DL
GFR AFRICAN AMERICAN: >60 ML/MIN
GFR NON-AFRICAN AMERICAN: >60 ML/MIN
GFR SERPL CREATININE-BSD FRML MDRD: ABNORMAL ML/MIN/{1.73_M2}
GFR SERPL CREATININE-BSD FRML MDRD: ABNORMAL ML/MIN/{1.73_M2}
GLUCOSE BLD-MCNC: 130 MG/DL (ref 70–99)
HBA1C MFR BLD: 7.2 % (ref 4–6)
HCT VFR BLD CALC: 48.7 % (ref 36.3–47.1)
HDLC SERPL-MCNC: 36 MG/DL
HEMOGLOBIN: 15.6 G/DL (ref 11.9–15.1)
IMMATURE GRANULOCYTES: 1 %
LDL CHOLESTEROL: 159 MG/DL (ref 0–130)
LYMPHOCYTES # BLD: 26 % (ref 24–43)
MCH RBC QN AUTO: 28 PG (ref 25.2–33.5)
MCHC RBC AUTO-ENTMCNC: 32 G/DL (ref 28.4–34.8)
MCV RBC AUTO: 87.4 FL (ref 82.6–102.9)
MICROALBUMIN/CREAT 24H UR: <12 MG/L
MICROALBUMIN/CREAT UR-RTO: NORMAL MCG/MG CREAT
MONOCYTES # BLD: 9 % (ref 3–12)
NRBC AUTOMATED: 0 PER 100 WBC
PDW BLD-RTO: 13 % (ref 11.8–14.4)
PLATELET # BLD: 332 K/UL (ref 138–453)
PLATELET ESTIMATE: ABNORMAL
PMV BLD AUTO: 9.4 FL (ref 8.1–13.5)
POTASSIUM SERPL-SCNC: 4 MMOL/L (ref 3.7–5.3)
RBC # BLD: 5.57 M/UL (ref 3.95–5.11)
RBC # BLD: ABNORMAL 10*6/UL
SEG NEUTROPHILS: 60 % (ref 36–65)
SEGMENTED NEUTROPHILS ABSOLUTE COUNT: 5.28 K/UL (ref 1.5–8.1)
SODIUM BLD-SCNC: 136 MMOL/L (ref 135–144)
THYROXINE, FREE: 1.18 NG/DL (ref 0.93–1.7)
TRIGL SERPL-MCNC: 219 MG/DL
TSH SERPL DL<=0.05 MIU/L-ACNC: 0.87 MIU/L (ref 0.3–5)
VLDLC SERPL CALC-MCNC: ABNORMAL MG/DL (ref 1–30)
WBC # BLD: 8.6 K/UL (ref 3.5–11.3)
WBC # BLD: ABNORMAL 10*3/UL

## 2020-12-29 PROCEDURE — 83036 HEMOGLOBIN GLYCOSYLATED A1C: CPT

## 2020-12-29 PROCEDURE — 80048 BASIC METABOLIC PNL TOTAL CA: CPT

## 2020-12-29 PROCEDURE — 80061 LIPID PANEL: CPT

## 2020-12-29 PROCEDURE — 36415 COLL VENOUS BLD VENIPUNCTURE: CPT

## 2020-12-29 PROCEDURE — 82043 UR ALBUMIN QUANTITATIVE: CPT

## 2020-12-29 PROCEDURE — 85025 COMPLETE CBC W/AUTO DIFF WBC: CPT

## 2020-12-29 PROCEDURE — 82570 ASSAY OF URINE CREATININE: CPT

## 2020-12-29 PROCEDURE — 84443 ASSAY THYROID STIM HORMONE: CPT

## 2020-12-29 PROCEDURE — 84450 TRANSFERASE (AST) (SGOT): CPT

## 2020-12-29 PROCEDURE — 84439 ASSAY OF FREE THYROXINE: CPT

## 2020-12-29 PROCEDURE — 84460 ALANINE AMINO (ALT) (SGPT): CPT

## 2020-12-29 RX ORDER — HYDROXYZINE HYDROCHLORIDE 25 MG/1
25 TABLET, FILM COATED ORAL EVERY 8 HOURS PRN
Qty: 270 TABLET | Refills: 0 | Status: SHIPPED | OUTPATIENT
Start: 2020-12-29 | End: 2021-08-31

## 2020-12-30 ENCOUNTER — TELEPHONE (OUTPATIENT)
Dept: PRIMARY CARE CLINIC | Age: 65
End: 2020-12-30

## 2020-12-30 NOTE — TELEPHONE ENCOUNTER
----- Message from CYRUS Le CNP sent at 12/29/2020 11:33 PM EST -----  Labs are stable, however lipids have worsened considerably, please verify with patient and pharmacy she is taking/filling atorvastatin. Thank you.

## 2020-12-30 NOTE — TELEPHONE ENCOUNTER
Attempted to call pt with lab results and no answer and mailbox is full. Rebekah Rutledge to check on pts Atorvastatin and was told that pt fill medication regular and last fill on 11/13/2020 for 90 day supply.

## 2020-12-31 ENCOUNTER — TELEPHONE (OUTPATIENT)
Dept: PRIMARY CARE CLINIC | Age: 65
End: 2020-12-31

## 2020-12-31 NOTE — TELEPHONE ENCOUNTER
I spoke with pt., she reports she does not take as prescribed, she forgets to take frequently. I did give her a couple or suggestions (set an alarm) to help her remember. She reports she will try to work on diet & exercise and to try to take her meds regularly.  ANDREW

## 2020-12-31 NOTE — TELEPHONE ENCOUNTER
----- Message from Lanice Bence, APRN - CNP sent at 12/29/2020 11:33 PM EST -----  Labs are stable, however lipids have worsened considerably, please verify with patient and pharmacy she is taking/filling atorvastatin. Thank you.

## 2021-01-04 NOTE — TELEPHONE ENCOUNTER
Called patient and left message that her labs were stable and that her lipids were worsening and Daniel wanted to make sure she is taking her Atorvastatin. To return call to office.

## 2021-01-11 ENCOUNTER — TELEPHONE (OUTPATIENT)
Dept: PRIMARY CARE CLINIC | Age: 66
End: 2021-01-11

## 2021-01-11 ENCOUNTER — HOSPITAL ENCOUNTER (OUTPATIENT)
Dept: GENERAL RADIOLOGY | Age: 66
Discharge: HOME | End: 2021-01-13
Payer: MEDICARE

## 2021-01-11 ENCOUNTER — HOSPITAL ENCOUNTER (OUTPATIENT)
Age: 66
Discharge: HOME | End: 2021-01-13
Payer: MEDICARE

## 2021-01-11 ENCOUNTER — OFFICE VISIT (OUTPATIENT)
Dept: PRIMARY CARE CLINIC | Age: 66
End: 2021-01-11
Payer: MEDICARE

## 2021-01-11 VITALS
BODY MASS INDEX: 37.43 KG/M2 | SYSTOLIC BLOOD PRESSURE: 138 MMHG | DIASTOLIC BLOOD PRESSURE: 74 MMHG | OXYGEN SATURATION: 98 % | HEART RATE: 88 BPM | WEIGHT: 224.9 LBS | TEMPERATURE: 97.8 F | RESPIRATION RATE: 22 BRPM

## 2021-01-11 DIAGNOSIS — S83.8X1A ACUTE MEDIAL MENISCAL INJURY OF RIGHT KNEE, INITIAL ENCOUNTER: Primary | ICD-10-CM

## 2021-01-11 DIAGNOSIS — S83.8X1A ACUTE MEDIAL MENISCAL INJURY OF RIGHT KNEE, INITIAL ENCOUNTER: ICD-10-CM

## 2021-01-11 DIAGNOSIS — G89.29 CHRONIC PAIN OF RIGHT KNEE: ICD-10-CM

## 2021-01-11 DIAGNOSIS — M25.561 CHRONIC PAIN OF RIGHT KNEE: ICD-10-CM

## 2021-01-11 PROCEDURE — 4040F PNEUMOC VAC/ADMIN/RCVD: CPT | Performed by: NURSE PRACTITIONER

## 2021-01-11 PROCEDURE — G8484 FLU IMMUNIZE NO ADMIN: HCPCS | Performed by: NURSE PRACTITIONER

## 2021-01-11 PROCEDURE — 4004F PT TOBACCO SCREEN RCVD TLK: CPT | Performed by: NURSE PRACTITIONER

## 2021-01-11 PROCEDURE — G8427 DOCREV CUR MEDS BY ELIG CLIN: HCPCS | Performed by: NURSE PRACTITIONER

## 2021-01-11 PROCEDURE — 3017F COLORECTAL CA SCREEN DOC REV: CPT | Performed by: NURSE PRACTITIONER

## 2021-01-11 PROCEDURE — G8417 CALC BMI ABV UP PARAM F/U: HCPCS | Performed by: NURSE PRACTITIONER

## 2021-01-11 PROCEDURE — 99214 OFFICE O/P EST MOD 30 MIN: CPT | Performed by: NURSE PRACTITIONER

## 2021-01-11 PROCEDURE — 73562 X-RAY EXAM OF KNEE 3: CPT

## 2021-01-11 PROCEDURE — 1123F ACP DISCUSS/DSCN MKR DOCD: CPT | Performed by: NURSE PRACTITIONER

## 2021-01-11 PROCEDURE — 1090F PRES/ABSN URINE INCON ASSESS: CPT | Performed by: NURSE PRACTITIONER

## 2021-01-11 PROCEDURE — G8400 PT W/DXA NO RESULTS DOC: HCPCS | Performed by: NURSE PRACTITIONER

## 2021-01-11 RX ORDER — ETODOLAC 400 MG/1
400 TABLET, FILM COATED ORAL 2 TIMES DAILY
Qty: 60 TABLET | Refills: 0 | Status: SHIPPED | OUTPATIENT
Start: 2021-01-11 | End: 2021-12-29

## 2021-01-11 ASSESSMENT — ENCOUNTER SYMPTOMS
RHINORRHEA: 0
VOMITING: 0
NAUSEA: 0
COUGH: 0
ABDOMINAL PAIN: 0
SHORTNESS OF BREATH: 0
WHEEZING: 0
SORE THROAT: 0
CONSTIPATION: 0
DIARRHEA: 0

## 2021-01-11 ASSESSMENT — PATIENT HEALTH QUESTIONNAIRE - PHQ9
2. FEELING DOWN, DEPRESSED OR HOPELESS: 0
SUM OF ALL RESPONSES TO PHQ QUESTIONS 1-9: 0

## 2021-01-11 NOTE — PROGRESS NOTES
Name: Consuelo Lim  : 1955         Chief Complaint:     Chief Complaint   Patient presents with    Knee Pain     right knee X 5 days, denies injury. History of Present Illness:      Consuelo Lim is a 78 y.o.  female who presents with Knee Pain (right knee X 5 days, denies injury. )      Fouzia Dolan is here today for routine office visit. Acute on Chronic right knee pain-patient states last Thursday she was walking and her right knee \"gave out\". She did not fall. She states ever since then she has been having pain on the medial aspect of the right knee and has been unable to bear full weight. She is using a walker. She has tried rest ice and elevation. No improvement. She states she previously had cortisone injection into this knee approximately 2 years ago. Knee Pain   The incident occurred more than 1 week ago. The incident occurred at home. The injury mechanism is unknown. The pain is present in the right knee. The quality of the pain is described as aching, shooting and stabbing. The pain is at a severity of 6/10. The pain is moderate. The pain has been constant since onset. Associated symptoms include an inability to bear weight and a loss of motion. Pertinent negatives include no loss of sensation, muscle weakness or tingling. She reports no foreign bodies present. The symptoms are aggravated by movement, palpation and weight bearing. She has tried non-weight bearing, rest, ice and acetaminophen for the symptoms. The treatment provided mild relief.          Past Medical History:     Past Medical History:   Diagnosis Date    COPD (chronic obstructive pulmonary disease) ([address]     Hyperlipidemia     Hyperthyroidism     Hypothyroidism     Obesity     Osteoarthritis     Sleep apnea     Tobacco abuse     Type II or unspecified type diabetes mellitus without mention of complication, not stated as uncontrolled       Reviewed all health maintenance requirements and ordered appropriate tests  Health Maintenance Due   Topic Date Due    Breast cancer screen  07/16/2020       Past Surgical History:     Past Surgical History:   Procedure Laterality Date    HEEL SPUR SURGERY      right heel    TONSILLECTOMY      TOOTH EXTRACTION      All of back teeth    TUBAL LIGATION          Medications:       Prior to Admission medications    Medication Sig Start Date End Date Taking? Authorizing Provider   Elastic Bandages & Supports (KNEE BRACE/HINGED BARS MEDIUM) MISC 1 each by Does not apply route daily 1/11/21  Yes CYRUS Coats CNP   etodolac (LODINE) 400 MG tablet Take 1 tablet by mouth 2 times daily 1/11/21  Yes CYRUS Coats CNP   hydrOXYzine (ATARAX) 25 MG tablet Take 1 tablet by mouth every 8 hours as needed for Anxiety 12/29/20 3/29/21 Yes CYRUS Coats CNP   lisinopril (PRINIVIL;ZESTRIL) 2.5 MG tablet TAKE ONE TABLET BY MOUTH DAILY 12/28/20  Yes CYRUS Coats CNP   levothyroxine (SYNTHROID) 175 MCG tablet TAKE ONE TABLET BY MOUTH DAILY 12/28/20  Yes CYRUS Coats CNP   venlafaxine (EFFEXOR XR) 37.5 MG extended release capsule Take 1 capsule by mouth daily 12/28/20  Yes CYRUS Coats CNP   atorvastatin (LIPITOR) 40 MG tablet TAKE ONE TABLET BY MOUTH DAILY 12/28/20  Yes CYRUS Coats CNP   metFORMIN (GLUCOPHAGE-XR) 500 MG extended release tablet Take 2 tablets by mouth daily (with breakfast) 12/28/20  Yes CYRUS Coats CNP   SITagliptin (JANUVIA) 100 MG tablet Take 1 tablet by mouth daily 6/25/20  Yes CYRUS Coats CNP   albuterol sulfate HFA (PROAIR HFA) 108 (90 Base) MCG/ACT inhaler Inhale 2 puffs into the lungs every 4-6 hours as needed for Wheezing 4/9/20  Yes CYRUS Coats CNP   aspirin EC 81 MG EC tablet Take 81 mg by mouth daily. Yes Historical Provider, MD   varenicline (CHANTIX STARTING MONTH PAK) 0.5 MG X 11 & 1 MG X 42 tablet Take by mouth.   Patient not taking: Reported on 12/28/2020 11/10/20   Jael Macias Might, APRN - CNP   Blood Glucose Monitoring Suppl (TRUE METRIX METER) W/DEVICE KIT 1 each by Does not apply route daily  Patient not taking: Reported on 1/11/2021 2/8/17   Martinez Beverage Might, APRN - CNP   glucose blood VI test strips (TRUE METRIX BLOOD GLUCOSE TEST) strip 1 each by In Vitro route daily  Patient not taking: Reported on 1/11/2021 2/8/17   Martinez Beverage Might, APRN - CNP   Lancets MISC 1 each by Does not apply route daily  Patient not taking: Reported on 1/11/2021 2/8/17   Martinez Beverage Might, APRN - CNP        Allergies:       Patient has no known allergies. Social History:     Tobacco:    reports that she has been smoking. She has a 40.00 pack-year smoking history. She has never used smokeless tobacco.  Alcohol:      reports no history of alcohol use. Drug Use:  reports no history of drug use. Family History:     Family History   Problem Relation Age of Onset   [place] Cancer Father     Diabetes Sister     Diabetes Brother     Diabetes Paternal Grandmother        Review of Systems:     Positive and Negative as described in HPI    Review of Systems   Constitutional: Negative for chills, fatigue and fever. HENT: Negative for congestion, rhinorrhea and sore throat. Eyes: Negative for visual disturbance. Respiratory: Negative for cough, shortness of breath and wheezing. Cardiovascular: Negative for chest pain and palpitations. Gastrointestinal: Negative for abdominal pain, constipation, diarrhea, nausea and vomiting. Genitourinary: Negative for difficulty urinating and dysuria. Musculoskeletal: Positive for arthralgias and gait problem. Negative for neck pain and neck stiffness. Skin: Negative for rash. Neurological: Negative for dizziness, tingling, syncope, light-headedness and headaches.        Physical Exam:   Vitals:  /74 (Site: Left Upper Arm)   Pulse 88   Temp 97.8 °F (36.6 °C) (Temporal)   Resp 22   Wt 224 lb 14.4 oz (102 kg)   SpO2 98%   BMI 37.43 kg/m²     Physical Exam  Vitals signs and nursing note reviewed. Constitutional:       General: She is not in acute distress. Appearance: Normal appearance. She is obese. She is not ill-appearing. HENT:      Mouth/Throat:      Mouth: Mucous membranes are moist.   Eyes:      General: No scleral icterus. Conjunctiva/sclera: Conjunctivae normal.   Neck:      Musculoskeletal: Normal range of motion and neck supple. Cardiovascular:      Rate and Rhythm: Normal rate and regular rhythm. Heart sounds: No murmur. Pulmonary:      Effort: Pulmonary effort is normal.      Breath sounds: Normal breath sounds. No wheezing. Musculoskeletal:         General: Tenderness present. Right knee: She exhibits decreased range of motion, swelling and abnormal meniscus. She exhibits no effusion. Tenderness found. Medial joint line tenderness noted. Right lower leg: No edema. Left lower leg: No edema. Skin:     General: Skin is warm and dry. Findings: No rash. Neurological:      Mental Status: She is alert and oriented to person, place, and time.    Psychiatric:         Mood and Affect: Mood normal.         Behavior: Behavior normal.         Data:     Lab Results   Component Value Date     12/29/2020    K 4.0 12/29/2020     12/29/2020    CO2 28 12/29/2020    BUN 8 12/29/2020    CREATININE 0.57 12/29/2020    GLUCOSE 130 12/29/2020    GLUCOSE 103 05/24/2012    PROT 7.5 10/09/2019    LABALBU 3.9 10/09/2019    LABALBU 4.5 10/27/2011    BILITOT 0.35 10/09/2019    ALKPHOS 132 10/09/2019    AST 15 12/29/2020    ALT 15 12/29/2020     Lab Results   Component Value Date    WBC 8.6 12/29/2020    RBC 5.57 12/29/2020    RBC 4.83 05/24/2012    HGB 15.6 12/29/2020    HCT 48.7 12/29/2020    MCV 87.4 12/29/2020    MCH 28.0 12/29/2020    MCHC 32.0 12/29/2020    RDW 13.0 12/29/2020     12/29/2020     05/24/2012    MPV 9.4 12/29/2020     Lab Results   Component Value Date    TSH 0.87 12/29/2020     Lab Results Component Value Date    CHOL 239 2020    HDL 36 2020    LABA1C 7.2 2020       Assessment/Plan:      Diagnosis Orders   1. Acute medial meniscal injury of right knee, initial encounter  XR KNEE RIGHT (3 VIEWS)    External Referral To Orthopedic Surgery    Elastic Bandages & Supports (KNEE BRACE/HINGED BARS MEDIUM) MISC    etodolac (LODINE) 400 MG tablet   2. Chronic pain of right knee  XR KNEE RIGHT (3 VIEWS)    External Referral To Orthopedic Surgery    Elastic Bandages & Supports (KNEE BRACE/HINGED BARS MEDIUM) MISC    etodolac (LODINE) 400 MG tablet     She has walker. We will provide hinged knee brace. X-ray of the right knee. Loading 400 mg twice daily for pain. We have referred her to Ortho for evaluation. 1.  Cyndy received counseling on the following healthy behaviors: nutrition, exercise and medication adherence  2. Patient given educational materials - see patient instructions  3. Was a self-tracking handout given in paper form or via [employer]? No  If yes, see orders or list here. 4.  Discussed use, benefit, and side effects of prescribed medications. Barriers to medication compliance addressed. All patient questions answered. Pt voiced understanding. 5.  Reviewed prior labs and health maintenance  6. Continue current medications, diet and exercise. Completed Refills   Requested Prescriptions     Signed Prescriptions Disp Refills    Elastic Bandages & Supports (KNEE BRACE/HINGED BARS MEDIUM) MISC 1 each 0     Si each by Does not apply route daily    etodolac (LODINE) 400 MG tablet 60 tablet 0     Sig: Take 1 tablet by mouth 2 times daily         Return if symptoms worsen or fail to improve.

## 2021-01-11 NOTE — TELEPHONE ENCOUNTER
Patient returned phone call and was informed that her xray showed that she does have some fluid around the right knee and [place] would like her to use her walker, brace and take medication as instructed and to f/u with Rosemary Leventhal CNP, orthopedics as planned. Verbalizes understanding.

## 2021-01-11 NOTE — PATIENT INSTRUCTIONS
SURVEY:    You may be receiving a survey from [employer] regarding your visit today. Please complete the survey to enable us to provide the highest quality of care to you and your family. If you cannot score us a very good on any question, please call the office to discuss how we could have made your experience a very good one. Thank you.

## 2021-02-11 ENCOUNTER — TELEPHONE (OUTPATIENT)
Dept: PRIMARY CARE CLINIC | Age: 66
End: 2021-02-11

## 2021-02-11 ENCOUNTER — APPOINTMENT (OUTPATIENT)
Dept: CT IMAGING | Age: 66
End: 2021-02-11
Payer: MEDICARE

## 2021-02-11 ENCOUNTER — HOSPITAL ENCOUNTER (EMERGENCY)
Age: 66
Discharge: HOME | End: 2021-02-11
Attending: EMERGENCY MEDICINE
Payer: MEDICARE

## 2021-02-11 VITALS
OXYGEN SATURATION: 92 % | HEART RATE: 80 BPM | DIASTOLIC BLOOD PRESSURE: 49 MMHG | WEIGHT: 220 LBS | BODY MASS INDEX: 36.65 KG/M2 | TEMPERATURE: 99.2 F | RESPIRATION RATE: 16 BRPM | HEIGHT: 65 IN | SYSTOLIC BLOOD PRESSURE: 130 MMHG

## 2021-02-11 DIAGNOSIS — S39.012A STRAIN OF LUMBAR REGION, INITIAL ENCOUNTER: Primary | ICD-10-CM

## 2021-02-11 PROCEDURE — 99284 EMERGENCY DEPT VISIT MOD MDM: CPT

## 2021-02-11 PROCEDURE — 96372 THER/PROPH/DIAG INJ SC/IM: CPT

## 2021-02-11 PROCEDURE — 6370000000 HC RX 637 (ALT 250 FOR IP): Performed by: EMERGENCY MEDICINE

## 2021-02-11 PROCEDURE — 6360000002 HC RX W HCPCS: Performed by: EMERGENCY MEDICINE

## 2021-02-11 PROCEDURE — 72131 CT LUMBAR SPINE W/O DYE: CPT

## 2021-02-11 RX ORDER — TIZANIDINE 4 MG/1
4 TABLET ORAL EVERY 8 HOURS PRN
Qty: 15 TABLET | Refills: 0 | Status: SHIPPED | OUTPATIENT
Start: 2021-02-11 | End: 2022-06-28

## 2021-02-11 RX ORDER — LIDOCAINE 50 MG/G
1 PATCH TOPICAL DAILY
Qty: 10 PATCH | Refills: 0 | Status: SHIPPED | OUTPATIENT
Start: 2021-02-11 | End: 2021-02-19

## 2021-02-11 RX ORDER — ORPHENADRINE CITRATE 30 MG/ML
60 INJECTION INTRAMUSCULAR; INTRAVENOUS ONCE
Status: COMPLETED | OUTPATIENT
Start: 2021-02-11 | End: 2021-02-11

## 2021-02-11 RX ORDER — ACETAMINOPHEN 325 MG/1
650 TABLET ORAL EVERY 8 HOURS PRN
Qty: 30 TABLET | Refills: 0 | Status: SHIPPED | OUTPATIENT
Start: 2021-02-11

## 2021-02-11 RX ORDER — HYDROCODONE BITARTRATE AND ACETAMINOPHEN 5; 325 MG/1; MG/1
2 TABLET ORAL ONCE
Status: COMPLETED | OUTPATIENT
Start: 2021-02-11 | End: 2021-02-11

## 2021-02-11 RX ADMIN — HYDROCODONE BITARTRATE AND ACETAMINOPHEN 2 TABLET: 5; 325 TABLET ORAL at 02:46

## 2021-02-11 RX ADMIN — ORPHENADRINE CITRATE 60 MG: 60 INJECTION INTRAMUSCULAR; INTRAVENOUS at 02:46

## 2021-02-11 ASSESSMENT — ENCOUNTER SYMPTOMS
COLOR CHANGE: 0
VOMITING: 0
NAUSEA: 0
SHORTNESS OF BREATH: 0
WHEEZING: 0
ABDOMINAL PAIN: 0
BACK PAIN: 1

## 2021-02-11 ASSESSMENT — PAIN SCALES - GENERAL
PAINLEVEL_OUTOF10: 5
PAINLEVEL_OUTOF10: 3
PAINLEVEL_OUTOF10: 3

## 2021-02-11 ASSESSMENT — PAIN - FUNCTIONAL ASSESSMENT: PAIN_FUNCTIONAL_ASSESSMENT: 0-10

## 2021-02-11 ASSESSMENT — PAIN DESCRIPTION - DESCRIPTORS: DESCRIPTORS: NAGGING

## 2021-02-11 ASSESSMENT — PAIN DESCRIPTION - LOCATION
LOCATION: BACK
LOCATION: BACK

## 2021-02-11 NOTE — TELEPHONE ENCOUNTER
[facility] Initial Follow Up Call    Outreach made within 2 business days of discharge: Yes    Patient: Blanca Tapia Patient : 1955   MRN: [id number]  Reason for Admission: There are no discharge diagnoses documented for the most recent discharge. Discharge Date: 21       Spoke with: Demarco Moreau    Discharge department/facility: [facility] ER    TCM Interactive Patient Contact:  Was patient able to fill all prescriptions: Yes  Was patient instructed to bring all medications to the follow-up visit: Yes  Is patient taking all medications as directed in the discharge summary? Yes  Does patient understand their discharge instructions: Yes  Does patient have questions or concerns that need addressed prior to 7-14 day follow up office visit: no, will call if needs appt.      Scheduled appointment with PCP within 7-14 days    Follow Up  Future Appointments   Date Time Provider Christopher Petersen   2021  9:00 AM Anjum Johnson APRN - CNP [facility]   2021  2:00  [facility], [facility]  [address] [place] [address] [facility]       Leslye Powers

## 2021-02-11 NOTE — ED PROVIDER NOTES
[facility] ED  Emergency Department Encounter  EmergencyMedicine Attending     Pt Name:Cyndy Stewart  MRN: [id number]  [place] 1955  Date of evaluation: 2/11/21  PCP:  Margi Cameron       Chief Complaint   Patient presents with    Back Pain     c/o back pain since monday morning, denies injury and BLE numbness       HISTORY OF PRESENT ILLNESS  (Location/Symptom, Timing/Onset, Context/Setting, Quality, Duration, Modifying Factors, Severity.)      Zbigniew Guardado is a 78 y.o. female who presents with back pain. Pain has been ongoing for the last 2 3 days, progressively is getting worse. Pain is worst with getting up from a seated/squatted position. No radiations of pain down the lower extremities. Some tingling reported mostly on the left lower extremity but there is no sensory deficit objective. .  No weakness, mostly difficulty with ambulation secondary to pain. But no weakness reported by the patient. No urinary or bowel bladder incontinence, no urinary retention or difficulty urinating. No falls or traumatic injury reported by the patient. Pain is moderate to severe, worse with ambulation and standing up, sharp, no radiations    PAST MEDICAL / SURGICAL / SOCIAL / FAMILY HISTORY      has a past medical history of COPD (chronic obstructive pulmonary disease) ([facility] [address] Hyperlipidemia, Hyperthyroidism, Hypothyroidism, Obesity, Osteoarthritis, Sleep apnea, Tobacco abuse, and Type II or unspecified type diabetes mellitus without mention of complication, not stated as uncontrolled. has a past surgical history that includes Heel spur surgery; Tonsillectomy; Tubal ligation; and Tooth Extraction. Social History     Socioeconomic History    Marital status:       Spouse name: Not on file    Number of children: Not on file    Years of education: Not on file    Highest education level: Not on file   Occupational History    Not on file   Social Needs    Financial resource strain: Not on file    Food insecurity     Worry: Not on file     Inability: Not on file    Transportation needs     Medical: Not on file     Non-medical: Not on file   Tobacco Use    Smoking status: Current Every Day Smoker     Packs/day: 1.00     Years: 40.00     Pack years: 40.00    Smokeless tobacco: Never Used   Substance and Sexual Activity    Alcohol use: No     Alcohol/week: 0.0 standard drinks    Drug use: No    Sexual activity: Never   Lifestyle    Physical activity     Days per week: Not on file     Minutes per session: Not on file    Stress: Not on file   Relationships    Social connections     Talks on phone: Not on file     Gets together: Not on file     Attends Sikhism service: Not on file     Active member of club or organization: Not on file     Attends meetings of clubs or organizations: Not on file     Relationship status: Not on file    Intimate partner violence     Fear of current or ex partner: Not on file     Emotionally abused: Not on file     Physically abused: Not on file     Forced sexual activity: Not on file   Other Topics Concern    Not on file   Social History Narrative    Not on file       Family History   Problem Relation Age of Onset    Cancer Father     Diabetes Sister     Diabetes Brother     Diabetes Paternal Grandmother        Allergies:  Patient has no known allergies. Home Medications:  Prior to Admission medications    Medication Sig Start Date End Date Taking?  Authorizing Provider   lidocaine (LIDODERM) 5 % Place 1 patch onto the skin daily for 10 days 12 hours on, 12 hours off. 2/11/21 2/21/21 Yes Pam Scott MD   tiZANidine (ZANAFLEX) 4 MG tablet Take 1 tablet by mouth every 8 hours as needed (Back pain) 2/11/21  Yes Pam Scott MD   acetaminophen (TYLENOL) 325 MG tablet Take 2 tablets by mouth every 8 hours as needed for Pain 2/11/21  Yes Pam Scott MD   Elastic Bandages & Supports (KNEE BRACE/HINGED BARS MEDIUM) [facility] 1 each by Does not apply route daily 1/11/21   Melodie  Might, APRN - CNP   etodolac (LODINE) 400 MG tablet Take 1 tablet by mouth 2 times daily 1/11/21 Retta  Might, APRN - CNP   hydrOXYzine (ATARAX) 25 MG tablet Take 1 tablet by mouth every 8 hours as needed for Anxiety 12/29/20 3/29/21  Melodie  Might, APRN - CNP   lisinopril (PRINIVIL;ZESTRIL) 2.5 MG tablet TAKE ONE TABLET BY MOUTH DAILY 12/28/20 Retta  Might, APRN - CNP   levothyroxine (SYNTHROID) 175 MCG tablet TAKE ONE TABLET BY MOUTH DAILY 12/28/20 Retta Mechanic Might, APRN - CNP   venlafaxine (EFFEXOR XR) 37.5 MG extended release capsule Take 1 capsule by mouth daily 12/28/20 Retta Mechanic Might, APRN - CNP   atorvastatin (LIPITOR) 40 MG tablet TAKE ONE TABLET BY MOUTH DAILY 12/28/20 Retta Mechanic Might, APRN - CNP   metFORMIN (GLUCOPHAGE-XR) 500 MG extended release tablet Take 2 tablets by mouth daily (with breakfast) 12/28/20 Retta Mechanic Might, APRN - CNP   varenicline (CHANTIX STARTING MONTH PAK) 0.5 MG X 11 & 1 MG X 42 tablet Take by mouth. Patient not taking: Reported on 12/28/2020 11/10/20   Melodie  Might, APRN - CNP   SITagliptin (JANUVIA) 100 MG tablet Take 1 tablet by mouth daily 6/25/20 Retta Mechanic Might, APRN - CNP   albuterol sulfate HFA (PROAIR HFA) 108 (90 Base) MCG/ACT inhaler Inhale 2 puffs into the lungs every 4-6 hours as needed for Wheezing 4/9/20   Melodie  Might, APRN - CNP   Blood Glucose Monitoring Suppl (TRUE METRIX METER) W/DEVICE KIT 1 each by Does not apply route daily  Patient not taking: Reported on 1/11/2021 2/8/17   Melodie  Might, APRN - CNP   glucose blood VI test strips (TRUE METRIX BLOOD GLUCOSE TEST) strip 1 each by In Vitro route daily  Patient not taking: Reported on 1/11/2021 2/8/17   CYRUS Vargas CNP   Lancets MISC 1 each by Does not apply route daily  Patient not taking: Reported on 1/11/2021 2/8/17   CYRUS Vargas CNP   aspirin EC 81 MG EC tablet Take 81 mg by mouth daily.       Historical Provider, MD Mental Status: She is alert. Comments: Normal strength and sensation of the bilateral lower extremities with no motor or sensory deficit. Normal patellar reflexes as well. Patient was in fact able to ambulate in my emergency room once pain control was achieved with the help of a walker. Patient walks with a walker at baseline as well. Negative straight leg raises. DIFFERENTIAL  DIAGNOSIS     PLAN (LABS / IMAGING / EKG):  Orders Placed This Encounter   Procedures    CT LUMBAR SPINE WO CONTRAST       MEDICATIONS ORDERED:  Orders Placed This Encounter   Medications    orphenadrine (NORFLEX) injection 60 mg    HYDROcodone-acetaminophen (NORCO) 5-325 MG per tablet 2 tablet    lidocaine (LIDODERM) 5 %     Sig: Place 1 patch onto the skin daily for 10 days 12 hours on, 12 hours off. Dispense:  10 patch     Refill:  0    tiZANidine (ZANAFLEX) 4 MG tablet     Sig: Take 1 tablet by mouth every 8 hours as needed (Back pain)     Dispense:  15 tablet     Refill:  0    acetaminophen (TYLENOL) 325 MG tablet     Sig: Take 2 tablets by mouth every 8 hours as needed for Pain     Dispense:  30 tablet     Refill:  0       DDX: Musculoskeletal pain versus sprain versus strain versus fracture versus aortic aneurysm versus disc herniation    DIAGNOSTIC RESULTS / EMERGENCY DEPARTMENT COURSE / MDM   :  No results found for this visit on 02/11/21. IMPRESSION: 79-year-old female who presents to the emergency department secondary to back pain, no falls, pain is worse with ambulation and sitting up, mostly paraspinal, possibly a musculoskeletal origin. CT of the lumbar spine was done, we made sure to include the aorta on the CT imaging and we confirmed that there was no aneurysm of the aorta. The maximum width of the aorta was 2.5 cm. Otherwise CT did not show any acute findings, mostly chronic findings which I updated the patient on. Otherwise we will continue pain control.   Once pain control was achieved in the emergency room, patient was able to ambulate with a walker without any assistance. Plan to discharge, neurovascular intact, follow-up with PCP and return to the ER if any worsening of symptoms    RADIOLOGY:    Ct Lumbar Spine Wo Contrast    Result Date: 2/11/2021  EXAMINATION: CT OF THE LUMBAR SPINE WITHOUT CONTRAST  2/11/2021 TECHNIQUE: CT of the lumbar spine was performed without the administration of intravenous contrast. Multiplanar reformatted images are provided for review. Dose modulation, iterative reconstruction, and/or weight based adjustment of the mA/kV was utilized to reduce the radiation dose to as low as reasonably achievable. COMPARISON: None HISTORY: ORDERING SYSTEM PROVIDED HISTORY: Back pain. TECHNOLOGIST PROVIDED HISTORY: Back pain. FINDINGS: BONES/ALIGNMENT: There is normal alignment of the spine. The vertebral body heights are maintained. No osseous destructive lesion is seen. Partial sacralization of the L5 vertebral body is noted on the right. DEGENERATIVE CHANGES: Multilevel mild spondylosis is noted within the cervical spine without associated central canal compromise/stenosis identified. No significant neural foraminal stenosis is identified. SOFT TISSUES/RETROPERITONEUM: No paraspinal mass is seen. Multilevel mild lumbar spine spondylosis without evidence of associated central canal stenosis/compromise. No significant neural foraminal stenosis. Right-sided partial L5 sacralization. EKG  None    All EKG's are interpreted by the Emergency Department Physician who either signs or Co-signs this chart in the absence of a cardiologist.    EMERGENCY DEPARTMENT COURSE:    Feels much better, able to ambulate in the ER with a walker, normally uses a walker at home as well. PROCEDURES:  None    CONSULTS:  None    CRITICAL CARE:  None    FINAL IMPRESSION      1.  Strain of lumbar region, initial encounter          DISPOSITION / PLAN     DISPOSITION Decision To Discharge 02/11/2021 03:33:12 AM      PATIENT REFERRED TO:  [address] [facility], APRN [facility]  [phone]    Call in 1 day        DISCHARGE MEDICATIONS:  Discharge Medication List as of 2/11/2021  3:46 AM      START taking these medications    Details   lidocaine (LIDODERM) 5 % Place 1 patch onto the skin daily for 10 days 12 hours on, 12 hours off., Disp-10 patch, R-0Print      tiZANidine (ZANAFLEX) 4 MG tablet Take 1 tablet by mouth every 8 hours as needed (Back pain), Disp-15 tablet, R-0Print      acetaminophen (TYLENOL) 325 MG tablet Take 2 tablets by mouth every 8 hours as needed for Pain, Disp-30 tablet, R-0Print             Claude Grooms, MD  Emergency Medicine Attending    (Please note that portions of thisnote were completed with a voice recognition program.  Efforts were made to edit the dictations but occasionally words are mis-transcribed.)        Claude Grooms, MD  02/11/21 0045

## 2021-02-19 ENCOUNTER — HOSPITAL ENCOUNTER (OUTPATIENT)
Age: 66
Discharge: HOME | End: 2021-02-19
Payer: MEDICARE

## 2021-02-19 ENCOUNTER — OFFICE VISIT (OUTPATIENT)
Dept: PRIMARY CARE CLINIC | Age: 66
End: 2021-02-19
Payer: MEDICARE

## 2021-02-19 VITALS
BODY MASS INDEX: 38.11 KG/M2 | RESPIRATION RATE: 20 BRPM | TEMPERATURE: 97.8 F | WEIGHT: 225.5 LBS | SYSTOLIC BLOOD PRESSURE: 132 MMHG | DIASTOLIC BLOOD PRESSURE: 78 MMHG

## 2021-02-19 DIAGNOSIS — M54.42 CHRONIC BILATERAL LOW BACK PAIN WITH LEFT-SIDED SCIATICA: Primary | ICD-10-CM

## 2021-02-19 DIAGNOSIS — G89.29 CHRONIC NECK PAIN: ICD-10-CM

## 2021-02-19 DIAGNOSIS — G89.29 CHRONIC BILATERAL LOW BACK PAIN WITH LEFT-SIDED SCIATICA: Primary | ICD-10-CM

## 2021-02-19 DIAGNOSIS — M54.2 CHRONIC NECK PAIN: ICD-10-CM

## 2021-02-19 LAB
ABSOLUTE EOS #: 0.3 K/UL (ref 0–0.44)
ABSOLUTE IMMATURE GRANULOCYTE: 0.04 K/UL (ref 0–0.3)
ABSOLUTE LYMPH #: 2.16 K/UL (ref 1.1–3.7)
ABSOLUTE MONO #: 0.81 K/UL (ref 0.1–1.2)
ANION GAP SERPL CALCULATED.3IONS-SCNC: 8 MMOL/L (ref 9–17)
BASOPHILS # BLD: 1 % (ref 0–2)
BASOPHILS ABSOLUTE: 0.08 K/UL (ref 0–0.2)
BUN BLDV-MCNC: 5 MG/DL (ref 8–23)
BUN/CREAT BLD: 8 (ref 9–20)
CALCIUM SERPL-MCNC: 9.5 MG/DL (ref 8.6–10.4)
CHLORIDE BLD-SCNC: 99 MMOL/L (ref 98–107)
CO2: 30 MMOL/L (ref 20–31)
CREAT SERPL-MCNC: 0.6 MG/DL (ref 0.5–0.9)
DIFFERENTIAL TYPE: NORMAL
EKG ATRIAL RATE: 74 BPM
EKG P AXIS: 67 DEGREES
EKG P-R INTERVAL: 140 MS
EKG Q-T INTERVAL: 404 MS
EKG QRS DURATION: 78 MS
EKG QTC CALCULATION (BAZETT): 448 MS
EKG R AXIS: 61 DEGREES
EKG T AXIS: 73 DEGREES
EKG VENTRICULAR RATE: 74 BPM
EOSINOPHILS RELATIVE PERCENT: 3 % (ref 1–4)
ESTIMATED AVERAGE GLUCOSE: 154 MG/DL
GFR AFRICAN AMERICAN: >60 ML/MIN
GFR NON-AFRICAN AMERICAN: >60 ML/MIN
GFR SERPL CREATININE-BSD FRML MDRD: ABNORMAL ML/MIN/{1.73_M2}
GFR SERPL CREATININE-BSD FRML MDRD: ABNORMAL ML/MIN/{1.73_M2}
GLUCOSE BLD-MCNC: 127 MG/DL (ref 70–99)
HBA1C MFR BLD: 7 % (ref 4–6)
HCT VFR BLD CALC: 43 % (ref 36.3–47.1)
HEMOGLOBIN: 13.7 G/DL (ref 11.9–15.1)
IMMATURE GRANULOCYTES: 0 %
LYMPHOCYTES # BLD: 24 % (ref 24–43)
MCH RBC QN AUTO: 27.6 PG (ref 25.2–33.5)
MCHC RBC AUTO-ENTMCNC: 31.9 G/DL (ref 28.4–34.8)
MCV RBC AUTO: 86.7 FL (ref 82.6–102.9)
MONOCYTES # BLD: 9 % (ref 3–12)
NRBC AUTOMATED: 0 PER 100 WBC
PDW BLD-RTO: 13.4 % (ref 11.8–14.4)
PLATELET # BLD: 393 K/UL (ref 138–453)
PLATELET ESTIMATE: NORMAL
PMV BLD AUTO: 9.1 FL (ref 8.1–13.5)
POTASSIUM SERPL-SCNC: 3.7 MMOL/L (ref 3.7–5.3)
RBC # BLD: 4.96 M/UL (ref 3.95–5.11)
RBC # BLD: NORMAL 10*6/UL
SEG NEUTROPHILS: 63 % (ref 36–65)
SEGMENTED NEUTROPHILS ABSOLUTE COUNT: 5.61 K/UL (ref 1.5–8.1)
SODIUM BLD-SCNC: 137 MMOL/L (ref 135–144)
WBC # BLD: 9 K/UL (ref 3.5–11.3)
WBC # BLD: NORMAL 10*3/UL

## 2021-02-19 PROCEDURE — G8427 DOCREV CUR MEDS BY ELIG CLIN: HCPCS | Performed by: NURSE PRACTITIONER

## 2021-02-19 PROCEDURE — 1123F ACP DISCUSS/DSCN MKR DOCD: CPT | Performed by: NURSE PRACTITIONER

## 2021-02-19 PROCEDURE — 36415 COLL VENOUS BLD VENIPUNCTURE: CPT

## 2021-02-19 PROCEDURE — 1090F PRES/ABSN URINE INCON ASSESS: CPT | Performed by: NURSE PRACTITIONER

## 2021-02-19 PROCEDURE — G8484 FLU IMMUNIZE NO ADMIN: HCPCS | Performed by: NURSE PRACTITIONER

## 2021-02-19 PROCEDURE — G8417 CALC BMI ABV UP PARAM F/U: HCPCS | Performed by: NURSE PRACTITIONER

## 2021-02-19 PROCEDURE — 80048 BASIC METABOLIC PNL TOTAL CA: CPT

## 2021-02-19 PROCEDURE — 85025 COMPLETE CBC W/AUTO DIFF WBC: CPT

## 2021-02-19 PROCEDURE — 4004F PT TOBACCO SCREEN RCVD TLK: CPT | Performed by: NURSE PRACTITIONER

## 2021-02-19 PROCEDURE — 99214 OFFICE O/P EST MOD 30 MIN: CPT | Performed by: NURSE PRACTITIONER

## 2021-02-19 PROCEDURE — 83036 HEMOGLOBIN GLYCOSYLATED A1C: CPT

## 2021-02-19 PROCEDURE — G8400 PT W/DXA NO RESULTS DOC: HCPCS | Performed by: NURSE PRACTITIONER

## 2021-02-19 PROCEDURE — 4040F PNEUMOC VAC/ADMIN/RCVD: CPT | Performed by: NURSE PRACTITIONER

## 2021-02-19 PROCEDURE — 3017F COLORECTAL CA SCREEN DOC REV: CPT | Performed by: NURSE PRACTITIONER

## 2021-02-19 RX ORDER — HYDROCODONE BITARTRATE AND ACETAMINOPHEN 5; 325 MG/1; MG/1
TABLET ORAL
COMMUNITY
Start: 2021-02-08 | End: 2021-02-19

## 2021-02-19 RX ORDER — GABAPENTIN 300 MG/1
300 CAPSULE ORAL 3 TIMES DAILY
Qty: 90 CAPSULE | Refills: 2 | Status: SHIPPED | OUTPATIENT
Start: 2021-02-19 | End: 2021-05-24

## 2021-02-19 ASSESSMENT — ENCOUNTER SYMPTOMS
ABDOMINAL PAIN: 0
VOMITING: 0
WHEEZING: 0
BOWEL INCONTINENCE: 0
SHORTNESS OF BREATH: 0
CONSTIPATION: 0
VISUAL CHANGE: 0
BACK PAIN: 1
COUGH: 0
TROUBLE SWALLOWING: 0
RHINORRHEA: 0
SORE THROAT: 0
NAUSEA: 0
PHOTOPHOBIA: 0
DIARRHEA: 0

## 2021-02-19 ASSESSMENT — PATIENT HEALTH QUESTIONNAIRE - PHQ9
1. LITTLE INTEREST OR PLEASURE IN DOING THINGS: 0
SUM OF ALL RESPONSES TO PHQ QUESTIONS 1-9: 0

## 2021-02-19 NOTE — PROGRESS NOTES
Name: Skye Tan  : 1955         Chief Complaint:     Chief Complaint   Patient presents with    Back Pain     ongoing issue, getting worse       History of Present Illness:      Skye Tan is a 78 y.o.  female who presents with Back Pain (ongoing issue, getting worse)      Linsey Palm is here today for routine office visit. Worsening neck and back pain. Patient was seen in the emergency room a few weeks ago for low back pain. A CT was done which showed essentially arthritic changes. There were no acute findings. She is walking with a walker today. She states the pain is worse in the low back and radiates down the left side. She is taking Lodine with some modest relief. See below for further comments. Back Pain  This is a recurrent problem. The current episode started more than 1 month ago. The problem occurs daily. The problem has been gradually worsening since onset. The pain is present in the lumbar spine. The quality of the pain is described as aching, burning, cramping, shooting and stabbing. The pain radiates to the left thigh. The pain is at a severity of 6/10. The pain is moderate. The pain is worse during the day. The symptoms are aggravated by standing and position. Stiffness is present at night. Associated symptoms include leg pain and numbness. Pertinent negatives include no abdominal pain, bladder incontinence, bowel incontinence, chest pain, dysuria, fever, headaches, paresis, paresthesias, pelvic pain, perianal numbness, tingling, weakness or weight loss. Risk factors include lack of exercise, menopause, obesity and sedentary lifestyle. She has tried heat, ice, muscle relaxant, NSAIDs and analgesics for the symptoms. The treatment provided mild relief. Neck Pain   This is a recurrent problem. The current episode started more than 1 month ago. The problem occurs daily. The problem has been unchanged. The pain is associated with an unknown factor.  The pain is present in the midline. The quality of the pain is described as aching. The pain is at a severity of 4/10. The pain is mild. The symptoms are aggravated by position. The pain is worse during the day. Stiffness is present at night. Associated symptoms include leg pain and numbness. Pertinent negatives include no chest pain, fever, headaches, pain with swallowing, paresis, photophobia, syncope, tingling, trouble swallowing, visual change, weakness or weight loss. She has tried acetaminophen, ice, NSAIDs and heat for the symptoms. The treatment provided mild relief. Past Medical History:     Past Medical History:   Diagnosis Date    COPD (chronic obstructive pulmonary disease) ([facility] [address]     Hyperlipidemia     Hyperthyroidism     Hypothyroidism     Obesity     Osteoarthritis     Sleep apnea     Tobacco abuse     Type II or unspecified type diabetes mellitus without mention of complication, not stated as uncontrolled       Reviewed all health maintenance requirements and ordered appropriate tests  Health Maintenance Due   Topic Date Due    Breast cancer screen  07/16/2020       Past Surgical History:     Past Surgical History:   Procedure Laterality Date    HEEL SPUR SURGERY      right heel    TONSILLECTOMY      TOOTH EXTRACTION      All of back teeth    TUBAL LIGATION          Medications:       Prior to Admission medications    Medication Sig Start Date End Date Taking? Authorizing Provider   gabapentin (NEURONTIN) 300 MG capsule Take 1 capsule by mouth three times daily for 90 days.  2/19/21 5/20/21 Yes CYRUS Ibanez - CNP   tiZANidine (ZANAFLEX) 4 MG tablet Take 1 tablet by mouth every 8 hours as needed (Back pain) 2/11/21  Yes Noemí Judge MD   acetaminophen (TYLENOL) 325 MG tablet Take 2 tablets by mouth every 8 hours as needed for Pain 2/11/21  Yes Noemí Judge MD   Elastic Bandages & Supports (KNEE BRACE/HINGED BARS MEDIUM) MISC 1 each by Does not apply route daily 1/11/21  Yes CYRUS Ibanez - Abdominal:      General: Bowel sounds are normal. There is no distension. Palpations: Abdomen is soft. Tenderness: There is no abdominal tenderness. Musculoskeletal:      Cervical back: She exhibits decreased range of motion, tenderness and pain. Lumbar back: She exhibits decreased range of motion, tenderness and pain. She exhibits no spasm. Back:       Right lower leg: No edema. Left lower leg: No edema. Skin:     General: Skin is warm and dry. Findings: No rash. Neurological:      Mental Status: She is alert and oriented to person, place, and time. Psychiatric:         Mood and Affect: Mood normal.         Behavior: Behavior normal.         Data:     Lab Results   Component Value Date     12/29/2020    K 4.0 12/29/2020     12/29/2020    CO2 28 12/29/2020    BUN 8 12/29/2020    CREATININE 0.57 12/29/2020    GLUCOSE 130 12/29/2020    GLUCOSE 103 05/24/2012    PROT 7.5 10/09/2019    LABALBU 3.9 10/09/2019    LABALBU 4.5 10/27/2011    BILITOT 0.35 10/09/2019    ALKPHOS 132 10/09/2019    AST 15 12/29/2020    ALT 15 12/29/2020     Lab Results   Component Value Date    WBC 8.6 12/29/2020    RBC 5.57 12/29/2020    RBC 4.83 05/24/2012    HGB 15.6 12/29/2020    HCT 48.7 12/29/2020    MCV 87.4 12/29/2020    MCH 28.0 12/29/2020    MCHC 32.0 12/29/2020    RDW 13.0 12/29/2020     12/29/2020     05/24/2012    MPV 9.4 12/29/2020     Lab Results   Component Value Date    TSH 0.87 12/29/2020     Lab Results   Component Value Date    CHOL 239 12/29/2020    HDL 36 12/29/2020    LABA1C 7.2 12/29/2020       Assessment/Plan:      Diagnosis Orders   1. Chronic bilateral low back pain with left-sided sciatica  gabapentin (NEURONTIN) 300 MG capsule    External Referral To Pain Clinic   2.  Chronic neck pain  gabapentin (NEURONTIN) 300 MG capsule    External Referral To Pain Clinic    XR CERVICAL SPINE (2-3 VIEWS)     We will obtain x-ray of the cervical spine since this has not been done. Start gabapentin 300 mg nightly for 3-4 nights and then increase to twice a day for 3-4 nights and then increase to 3 times daily as tolerated. Referral to pain management made. She is established with orthopedics and will discuss her issues with them as well. 1.  Cyndy received counseling on the following healthy behaviors: nutrition, exercise and medication adherence  2. Patient given educational materials - see patient instructions  3. Was a self-tracking handout given in paper form or via [facility] [employer]? No  If yes, see orders or list here. 4.  Discussed use, benefit, and side effects of prescribed medications. Barriers to medication compliance addressed. All patient questions answered. Pt voiced understanding. 5.  Reviewed prior labs and health maintenance  6. Continue current medications, diet and exercise. Completed Refills   Requested Prescriptions     Signed Prescriptions Disp Refills    gabapentin (NEURONTIN) 300 MG capsule 90 capsule 2     Sig: Take 1 capsule by mouth three times daily for 90 days. Return if symptoms worsen or fail to improve.

## 2021-02-19 NOTE — PATIENT INSTRUCTIONS
SURVEY:    You may be receiving a survey from [employer] regarding your visit today. Please complete the survey to enable us to provide the highest quality of care to you and your family. If you cannot score us a very good on any question, please call the office to discuss how we could have made your experience a very good one. Thank you.

## 2021-02-24 ENCOUNTER — HOSPITAL ENCOUNTER (OUTPATIENT)
Age: 66
Discharge: HOME | End: 2021-02-26
Payer: MEDICARE

## 2021-02-24 ENCOUNTER — TELEPHONE (OUTPATIENT)
Dept: PRIMARY CARE CLINIC | Age: 66
End: 2021-02-24

## 2021-02-24 ENCOUNTER — HOSPITAL ENCOUNTER (OUTPATIENT)
Dept: GENERAL RADIOLOGY | Age: 66
Discharge: HOME | End: 2021-02-26
Payer: MEDICARE

## 2021-02-24 DIAGNOSIS — M54.2 CHRONIC NECK PAIN: ICD-10-CM

## 2021-02-24 DIAGNOSIS — G89.29 CHRONIC NECK PAIN: ICD-10-CM

## 2021-02-24 PROCEDURE — 72040 X-RAY EXAM NECK SPINE 2-3 VW: CPT

## 2021-02-24 NOTE — TELEPHONE ENCOUNTER
----- Message from [facility], CYRUS - CNP sent at 2/24/2021  2:51 PM EST -----  Mild disc disease noted and arthritis. Follow-up with pain management as discussed.

## 2021-02-25 ENCOUNTER — HOSPITAL ENCOUNTER (OUTPATIENT)
Dept: PREADMISSION TESTING | Age: 66
Setting detail: SPECIMEN
Discharge: HOME | End: 2021-03-01
Payer: MEDICARE

## 2021-02-25 DIAGNOSIS — Z01.818 PREOPERATIVE TESTING: ICD-10-CM

## 2021-02-25 DIAGNOSIS — Z01.818 PREOPERATIVE TESTING: Primary | ICD-10-CM

## 2021-02-25 PROCEDURE — U0005 INFEC AGEN DETEC AMPLI PROBE: HCPCS

## 2021-02-25 PROCEDURE — C9803 HOPD COVID-19 SPEC COLLECT: HCPCS

## 2021-02-25 PROCEDURE — U0003 INFECTIOUS AGENT DETECTION BY NUCLEIC ACID (DNA OR RNA); SEVERE ACUTE RESPIRATORY SYNDROME CORONAVIRUS 2 (SARS-COV-2) (CORONAVIRUS DISEASE [COVID-19]), AMPLIFIED PROBE TECHNIQUE, MAKING USE OF HIGH THROUGHPUT TECHNOLOGIES AS DESCRIBED BY CMS-2020-01-R: HCPCS

## 2021-02-25 NOTE — PROGRESS NOTES
Patient instructed on the pre-operative, intra-operative, and post-operative process. Patient's surgical procedure and day of surgery confirmed. Patient instructed on NPO status. Medication instructions reviewed with patient. CHG pre-operative wash instructions reviewed with patient. Pre operative instruction sheet reviewed with patient per PAT phone interview. Appointment time and instructions for pre-op COVID testing given to patient.

## 2021-02-26 ENCOUNTER — HOSPITAL ENCOUNTER (OUTPATIENT)
Dept: PREADMISSION TESTING | Age: 66
Setting detail: SPECIMEN
Discharge: HOME | End: 2021-03-02
Payer: MEDICARE

## 2021-02-26 DIAGNOSIS — Z01.818 PREOPERATIVE TESTING: ICD-10-CM

## 2021-02-26 DIAGNOSIS — Z01.818 PREOPERATIVE TESTING: Primary | ICD-10-CM

## 2021-02-26 LAB
SARS-COV-2: ABNORMAL
SARS-COV-2: ABNORMAL
SOURCE: ABNORMAL

## 2021-02-26 PROCEDURE — U0003 INFECTIOUS AGENT DETECTION BY NUCLEIC ACID (DNA OR RNA); SEVERE ACUTE RESPIRATORY SYNDROME CORONAVIRUS 2 (SARS-COV-2) (CORONAVIRUS DISEASE [COVID-19]), AMPLIFIED PROBE TECHNIQUE, MAKING USE OF HIGH THROUGHPUT TECHNOLOGIES AS DESCRIBED BY CMS-2020-01-R: HCPCS

## 2021-02-26 PROCEDURE — C9803 HOPD COVID-19 SPEC COLLECT: HCPCS

## 2021-02-26 PROCEDURE — U0005 INFEC AGEN DETEC AMPLI PROBE: HCPCS

## 2021-02-27 ENCOUNTER — TELEPHONE (OUTPATIENT)
Dept: PRIMARY CARE CLINIC | Age: 66
End: 2021-02-27

## 2021-02-27 NOTE — TELEPHONE ENCOUNTER
Instructed patient to call doctor/surgeons office on Monday morning due to results, instructed patient that they possibly will need a new test.

## 2021-02-28 LAB
SARS-COV-2: NORMAL
SARS-COV-2: NOT DETECTED
SOURCE: NORMAL

## 2021-03-01 ENCOUNTER — ANESTHESIA EVENT (OUTPATIENT)
Dept: OPERATING ROOM | Age: 66
DRG: 987 | End: 2021-03-01
Payer: MEDICARE

## 2021-03-01 ENCOUNTER — TELEPHONE (OUTPATIENT)
Dept: PRIMARY CARE CLINIC | Age: 66
End: 2021-03-01

## 2021-03-02 ENCOUNTER — APPOINTMENT (OUTPATIENT)
Dept: CT IMAGING | Age: 66
DRG: 987 | End: 2021-03-02
Attending: ORTHOPAEDIC SURGERY
Payer: MEDICARE

## 2021-03-02 ENCOUNTER — HOSPITAL ENCOUNTER (INPATIENT)
Age: 66
LOS: 2 days | Discharge: HOME | DRG: 987 | End: 2021-03-04
Attending: ORTHOPAEDIC SURGERY | Admitting: FAMILY MEDICINE
Payer: MEDICARE

## 2021-03-02 ENCOUNTER — ANESTHESIA (OUTPATIENT)
Dept: OPERATING ROOM | Age: 66
DRG: 987 | End: 2021-03-02
Payer: MEDICARE

## 2021-03-02 ENCOUNTER — HOSPITAL ENCOUNTER (OUTPATIENT)
Dept: NON INVASIVE DIAGNOSTICS | Age: 66
Discharge: HOME | DRG: 987 | End: 2021-03-02
Attending: ORTHOPAEDIC SURGERY
Payer: MEDICARE

## 2021-03-02 VITALS — SYSTOLIC BLOOD PRESSURE: 144 MMHG | DIASTOLIC BLOOD PRESSURE: 79 MMHG | OXYGEN SATURATION: 95 %

## 2021-03-02 DIAGNOSIS — I48.91 ATRIAL FIBRILLATION WITH RVR (HCC): Primary | ICD-10-CM

## 2021-03-02 PROBLEM — R09.02 HYPOXIA: Status: ACTIVE | Noted: 2021-03-02

## 2021-03-02 PROBLEM — J69.0 ASPIRATION PNEUMONIA (HCC): Status: ACTIVE | Noted: 2021-03-02

## 2021-03-02 PROBLEM — Z96.651 STATUS POST TOTAL RIGHT KNEE REPLACEMENT: Status: ACTIVE | Noted: 2021-03-02

## 2021-03-02 LAB
ALBUMIN SERPL-MCNC: 3.1 G/DL (ref 3.5–5.2)
ALBUMIN/GLOBULIN RATIO: 0.8 (ref 1–2.5)
ALP BLD-CCNC: 125 U/L (ref 35–104)
ALT SERPL-CCNC: 11 U/L (ref 5–33)
ANION GAP SERPL CALCULATED.3IONS-SCNC: 8 MMOL/L (ref 9–17)
AST SERPL-CCNC: 12 U/L
BILIRUB SERPL-MCNC: 0.27 MG/DL (ref 0.3–1.2)
BUN BLDV-MCNC: 9 MG/DL (ref 8–23)
BUN/CREAT BLD: 16 (ref 9–20)
CALCIUM SERPL-MCNC: 9.1 MG/DL (ref 8.6–10.4)
CHLORIDE BLD-SCNC: 96 MMOL/L (ref 98–107)
CO2: 30 MMOL/L (ref 20–31)
CREAT SERPL-MCNC: 0.56 MG/DL (ref 0.5–0.9)
D-DIMER QUANTITATIVE: 2.32 MG/L FEU (ref 0–0.59)
GFR AFRICAN AMERICAN: >60 ML/MIN
GFR NON-AFRICAN AMERICAN: >60 ML/MIN
GFR SERPL CREATININE-BSD FRML MDRD: ABNORMAL ML/MIN/{1.73_M2}
GFR SERPL CREATININE-BSD FRML MDRD: ABNORMAL ML/MIN/{1.73_M2}
GLUCOSE BLD-MCNC: 119 MG/DL (ref 74–100)
GLUCOSE BLD-MCNC: 244 MG/DL (ref 70–99)
INR BLD: 1.1
LV EF: 61 %
LVEF MODALITY: NORMAL
MAGNESIUM: 1.8 MG/DL (ref 1.6–2.6)
PARTIAL THROMBOPLASTIN TIME: 28.2 SEC (ref 23.9–33.8)
POTASSIUM SERPL-SCNC: 3.6 MMOL/L (ref 3.7–5.3)
PROTHROMBIN TIME: 14 SEC (ref 11.5–14.2)
SODIUM BLD-SCNC: 134 MMOL/L (ref 135–144)
TOTAL PROTEIN: 7 G/DL (ref 6.4–8.3)
TROPONIN INTERP: NORMAL
TROPONIN T: NORMAL NG/ML
TROPONIN, HIGH SENSITIVITY: 13 NG/L (ref 0–14)

## 2021-03-02 PROCEDURE — 93306 TTE W/DOPPLER COMPLETE: CPT

## 2021-03-02 PROCEDURE — 6370000000 HC RX 637 (ALT 250 FOR IP): Performed by: NURSE PRACTITIONER

## 2021-03-02 PROCEDURE — 3600000014 HC SURGERY LEVEL 4 ADDTL 15MIN: Performed by: ORTHOPAEDIC SURGERY

## 2021-03-02 PROCEDURE — 85610 PROTHROMBIN TIME: CPT

## 2021-03-02 PROCEDURE — 2709999900 HC NON-CHARGEABLE SUPPLY: Performed by: ORTHOPAEDIC SURGERY

## 2021-03-02 PROCEDURE — 94664 DEMO&/EVAL PT USE INHALER: CPT

## 2021-03-02 PROCEDURE — 6360000002 HC RX W HCPCS: Performed by: NURSE ANESTHETIST, CERTIFIED REGISTERED

## 2021-03-02 PROCEDURE — 3700000000 HC ANESTHESIA ATTENDED CARE: Performed by: ORTHOPAEDIC SURGERY

## 2021-03-02 PROCEDURE — 80053 COMPREHEN METABOLIC PANEL: CPT

## 2021-03-02 PROCEDURE — 7100000000 HC PACU RECOVERY - FIRST 15 MIN: Performed by: ORTHOPAEDIC SURGERY

## 2021-03-02 PROCEDURE — 1200000000 HC SEMI PRIVATE

## 2021-03-02 PROCEDURE — 3700000001 HC ADD 15 MINUTES (ANESTHESIA): Performed by: ORTHOPAEDIC SURGERY

## 2021-03-02 PROCEDURE — 85379 FIBRIN DEGRADATION QUANT: CPT

## 2021-03-02 PROCEDURE — 6370000000 HC RX 637 (ALT 250 FOR IP): Performed by: ORTHOPAEDIC SURGERY

## 2021-03-02 PROCEDURE — 3600000004 HC SURGERY LEVEL 4 BASE: Performed by: ORTHOPAEDIC SURGERY

## 2021-03-02 PROCEDURE — 6360000002 HC RX W HCPCS: Performed by: ORTHOPAEDIC SURGERY

## 2021-03-02 PROCEDURE — 0SBC4ZZ EXCISION OF RIGHT KNEE JOINT, PERCUTANEOUS ENDOSCOPIC APPROACH: ICD-10-PCS | Performed by: ORTHOPAEDIC SURGERY

## 2021-03-02 PROCEDURE — 71260 CT THORAX DX C+: CPT

## 2021-03-02 PROCEDURE — 84484 ASSAY OF TROPONIN QUANT: CPT

## 2021-03-02 PROCEDURE — 2500000003 HC RX 250 WO HCPCS: Performed by: NURSE ANESTHETIST, CERTIFIED REGISTERED

## 2021-03-02 PROCEDURE — 6360000002 HC RX W HCPCS: Performed by: FAMILY MEDICINE

## 2021-03-02 PROCEDURE — 2580000003 HC RX 258: Performed by: ORTHOPAEDIC SURGERY

## 2021-03-02 PROCEDURE — 7100000011 HC PHASE II RECOVERY - ADDTL 15 MIN: Performed by: ORTHOPAEDIC SURGERY

## 2021-03-02 PROCEDURE — 2580000003 HC RX 258: Performed by: FAMILY MEDICINE

## 2021-03-02 PROCEDURE — 2580000003 HC RX 258: Performed by: NURSE PRACTITIONER

## 2021-03-02 PROCEDURE — 7100000010 HC PHASE II RECOVERY - FIRST 15 MIN: Performed by: ORTHOPAEDIC SURGERY

## 2021-03-02 PROCEDURE — 36415 COLL VENOUS BLD VENIPUNCTURE: CPT

## 2021-03-02 PROCEDURE — 7100000001 HC PACU RECOVERY - ADDTL 15 MIN: Performed by: ORTHOPAEDIC SURGERY

## 2021-03-02 PROCEDURE — 6360000004 HC RX CONTRAST MEDICATION: Performed by: NURSE PRACTITIONER

## 2021-03-02 PROCEDURE — 85730 THROMBOPLASTIN TIME PARTIAL: CPT

## 2021-03-02 PROCEDURE — 82947 ASSAY GLUCOSE BLOOD QUANT: CPT

## 2021-03-02 PROCEDURE — 83735 ASSAY OF MAGNESIUM: CPT

## 2021-03-02 PROCEDURE — 93005 ELECTROCARDIOGRAM TRACING: CPT | Performed by: NURSE PRACTITIONER

## 2021-03-02 PROCEDURE — 94761 N-INVAS EAR/PLS OXIMETRY MLT: CPT

## 2021-03-02 PROCEDURE — 2700000000 HC OXYGEN THERAPY PER DAY

## 2021-03-02 RX ORDER — SODIUM CHLORIDE, SODIUM LACTATE, POTASSIUM CHLORIDE, CALCIUM CHLORIDE 600; 310; 30; 20 MG/100ML; MG/100ML; MG/100ML; MG/100ML
INJECTION, SOLUTION INTRAVENOUS CONTINUOUS
Status: DISCONTINUED | OUTPATIENT
Start: 2021-03-02 | End: 2021-03-03

## 2021-03-02 RX ORDER — ESMOLOL HYDROCHLORIDE 10 MG/ML
INJECTION INTRAVENOUS PRN
Status: DISCONTINUED | OUTPATIENT
Start: 2021-03-02 | End: 2021-03-02 | Stop reason: SDUPTHER

## 2021-03-02 RX ORDER — SODIUM CHLORIDE 0.9 % (FLUSH) 0.9 %
10 SYRINGE (ML) INJECTION EVERY 12 HOURS SCHEDULED
Status: DISCONTINUED | OUTPATIENT
Start: 2021-03-02 | End: 2021-03-02 | Stop reason: HOSPADM

## 2021-03-02 RX ORDER — DEXTROSE MONOHYDRATE 25 G/50ML
12.5 INJECTION, SOLUTION INTRAVENOUS PRN
Status: DISCONTINUED | OUTPATIENT
Start: 2021-03-02 | End: 2021-03-04 | Stop reason: HOSPADM

## 2021-03-02 RX ORDER — ROPIVACAINE HYDROCHLORIDE 5 MG/ML
INJECTION, SOLUTION EPIDURAL; INFILTRATION; PERINEURAL PRN
Status: DISCONTINUED | OUTPATIENT
Start: 2021-03-02 | End: 2021-03-02 | Stop reason: ALTCHOICE

## 2021-03-02 RX ORDER — FENTANYL CITRATE 50 UG/ML
INJECTION, SOLUTION INTRAMUSCULAR; INTRAVENOUS PRN
Status: DISCONTINUED | OUTPATIENT
Start: 2021-03-02 | End: 2021-03-02 | Stop reason: SDUPTHER

## 2021-03-02 RX ORDER — EPINEPHRINE 1 MG/ML
INJECTION, SOLUTION, CONCENTRATE INTRAVENOUS
Status: DISPENSED
Start: 2021-03-02 | End: 2021-03-03

## 2021-03-02 RX ORDER — ATORVASTATIN CALCIUM 40 MG/1
40 TABLET, FILM COATED ORAL DAILY
Status: DISCONTINUED | OUTPATIENT
Start: 2021-03-02 | End: 2021-03-04 | Stop reason: HOSPADM

## 2021-03-02 RX ORDER — METFORMIN HYDROCHLORIDE 500 MG/1
1000 TABLET, EXTENDED RELEASE ORAL
Status: DISCONTINUED | OUTPATIENT
Start: 2021-03-03 | End: 2021-03-04 | Stop reason: HOSPADM

## 2021-03-02 RX ORDER — ONDANSETRON 2 MG/ML
INJECTION INTRAMUSCULAR; INTRAVENOUS PRN
Status: DISCONTINUED | OUTPATIENT
Start: 2021-03-02 | End: 2021-03-02 | Stop reason: SDUPTHER

## 2021-03-02 RX ORDER — POLYETHYLENE GLYCOL 3350 17 G/17G
17 POWDER, FOR SOLUTION ORAL DAILY PRN
Status: DISCONTINUED | OUTPATIENT
Start: 2021-03-02 | End: 2021-03-04 | Stop reason: HOSPADM

## 2021-03-02 RX ORDER — PROMETHAZINE HYDROCHLORIDE 25 MG/ML
6.25 INJECTION, SOLUTION INTRAMUSCULAR; INTRAVENOUS
Status: DISCONTINUED | OUTPATIENT
Start: 2021-03-02 | End: 2021-03-02 | Stop reason: HOSPADM

## 2021-03-02 RX ORDER — METOCLOPRAMIDE HYDROCHLORIDE 5 MG/ML
10 INJECTION INTRAMUSCULAR; INTRAVENOUS
Status: DISCONTINUED | OUTPATIENT
Start: 2021-03-02 | End: 2021-03-02 | Stop reason: HOSPADM

## 2021-03-02 RX ORDER — HYDROCODONE BITARTRATE AND ACETAMINOPHEN 5; 325 MG/1; MG/1
1 TABLET ORAL
Status: DISCONTINUED | OUTPATIENT
Start: 2021-03-02 | End: 2021-03-02 | Stop reason: HOSPADM

## 2021-03-02 RX ORDER — ROPIVACAINE HYDROCHLORIDE 5 MG/ML
INJECTION, SOLUTION EPIDURAL; INFILTRATION; PERINEURAL
Status: DISPENSED
Start: 2021-03-02 | End: 2021-03-03

## 2021-03-02 RX ORDER — SODIUM CHLORIDE 0.9 % (FLUSH) 0.9 %
10 SYRINGE (ML) INJECTION PRN
Status: DISCONTINUED | OUTPATIENT
Start: 2021-03-02 | End: 2021-03-02 | Stop reason: HOSPADM

## 2021-03-02 RX ORDER — FENTANYL CITRATE 50 UG/ML
50 INJECTION, SOLUTION INTRAMUSCULAR; INTRAVENOUS EVERY 5 MIN PRN
Status: DISCONTINUED | OUTPATIENT
Start: 2021-03-02 | End: 2021-03-02 | Stop reason: HOSPADM

## 2021-03-02 RX ORDER — SODIUM CHLORIDE 0.9 % (FLUSH) 0.9 %
10 SYRINGE (ML) INJECTION EVERY 12 HOURS SCHEDULED
Status: DISCONTINUED | OUTPATIENT
Start: 2021-03-02 | End: 2021-03-04 | Stop reason: HOSPADM

## 2021-03-02 RX ORDER — NICOTINE POLACRILEX 4 MG
15 LOZENGE BUCCAL PRN
Status: DISCONTINUED | OUTPATIENT
Start: 2021-03-02 | End: 2021-03-04 | Stop reason: HOSPADM

## 2021-03-02 RX ORDER — ALOGLIPTIN 25 MG/1
25 TABLET, FILM COATED ORAL DAILY
Refills: 3 | Status: DISCONTINUED | OUTPATIENT
Start: 2021-03-02 | End: 2021-03-04 | Stop reason: HOSPADM

## 2021-03-02 RX ORDER — CEFAZOLIN SODIUM 2 G/50ML
2000 SOLUTION INTRAVENOUS ONCE
Status: COMPLETED | OUTPATIENT
Start: 2021-03-02 | End: 2021-03-02

## 2021-03-02 RX ORDER — SODIUM CHLORIDE 9 MG/ML
INJECTION, SOLUTION INTRAVENOUS CONTINUOUS
Status: DISCONTINUED | OUTPATIENT
Start: 2021-03-02 | End: 2021-03-03

## 2021-03-02 RX ORDER — DEXAMETHASONE SODIUM PHOSPHATE 4 MG/ML
INJECTION, SOLUTION INTRA-ARTICULAR; INTRALESIONAL; INTRAMUSCULAR; INTRAVENOUS; SOFT TISSUE PRN
Status: DISCONTINUED | OUTPATIENT
Start: 2021-03-02 | End: 2021-03-02 | Stop reason: SDUPTHER

## 2021-03-02 RX ORDER — MORPHINE SULFATE 10 MG/ML
INJECTION, SOLUTION INTRAMUSCULAR; INTRAVENOUS PRN
Status: DISCONTINUED | OUTPATIENT
Start: 2021-03-02 | End: 2021-03-02 | Stop reason: ALTCHOICE

## 2021-03-02 RX ORDER — DEXTROSE MONOHYDRATE 50 MG/ML
100 INJECTION, SOLUTION INTRAVENOUS PRN
Status: DISCONTINUED | OUTPATIENT
Start: 2021-03-02 | End: 2021-03-04 | Stop reason: HOSPADM

## 2021-03-02 RX ORDER — TIZANIDINE 4 MG/1
4 TABLET ORAL EVERY 8 HOURS PRN
Status: DISCONTINUED | OUTPATIENT
Start: 2021-03-02 | End: 2021-03-04 | Stop reason: HOSPADM

## 2021-03-02 RX ORDER — ACETAMINOPHEN 325 MG/1
650 TABLET ORAL EVERY 8 HOURS PRN
Status: DISCONTINUED | OUTPATIENT
Start: 2021-03-02 | End: 2021-03-04 | Stop reason: HOSPADM

## 2021-03-02 RX ORDER — NICOTINE 21 MG/24HR
1 PATCH, TRANSDERMAL 24 HOURS TRANSDERMAL DAILY
Status: DISCONTINUED | OUTPATIENT
Start: 2021-03-02 | End: 2021-03-04 | Stop reason: HOSPADM

## 2021-03-02 RX ORDER — ALBUTEROL SULFATE 2.5 MG/3ML
2.5 SOLUTION RESPIRATORY (INHALATION) EVERY 6 HOURS PRN
Status: DISCONTINUED | OUTPATIENT
Start: 2021-03-02 | End: 2021-03-04 | Stop reason: HOSPADM

## 2021-03-02 RX ORDER — FAMOTIDINE 20 MG/1
20 TABLET, FILM COATED ORAL 2 TIMES DAILY
Status: DISCONTINUED | OUTPATIENT
Start: 2021-03-02 | End: 2021-03-04 | Stop reason: HOSPADM

## 2021-03-02 RX ORDER — ALBUTEROL SULFATE 90 UG/1
2 AEROSOL, METERED RESPIRATORY (INHALATION) EVERY 4 HOURS PRN
Status: DISCONTINUED | OUTPATIENT
Start: 2021-03-02 | End: 2021-03-04 | Stop reason: HOSPADM

## 2021-03-02 RX ORDER — LISINOPRIL 2.5 MG/1
2.5 TABLET ORAL DAILY
Status: DISCONTINUED | OUTPATIENT
Start: 2021-03-02 | End: 2021-03-04 | Stop reason: HOSPADM

## 2021-03-02 RX ORDER — DIMENHYDRINATE 50 MG/1
50 TABLET ORAL ONCE
Status: COMPLETED | OUTPATIENT
Start: 2021-03-02 | End: 2021-03-02

## 2021-03-02 RX ORDER — MORPHINE SULFATE 10 MG/ML
INJECTION, SOLUTION INTRAMUSCULAR; INTRAVENOUS
Status: DISPENSED
Start: 2021-03-02 | End: 2021-03-03

## 2021-03-02 RX ORDER — LEVOTHYROXINE SODIUM 175 UG/1
1 TABLET ORAL DAILY
Status: DISCONTINUED | OUTPATIENT
Start: 2021-03-02 | End: 2021-03-02 | Stop reason: CLARIF

## 2021-03-02 RX ORDER — ACETAMINOPHEN 325 MG/1
650 TABLET ORAL ONCE
Status: COMPLETED | OUTPATIENT
Start: 2021-03-02 | End: 2021-03-02

## 2021-03-02 RX ORDER — LIDOCAINE HYDROCHLORIDE 20 MG/ML
INJECTION, SOLUTION EPIDURAL; INFILTRATION; INTRACAUDAL; PERINEURAL PRN
Status: DISCONTINUED | OUTPATIENT
Start: 2021-03-02 | End: 2021-03-02 | Stop reason: SDUPTHER

## 2021-03-02 RX ORDER — GABAPENTIN 300 MG/1
300 CAPSULE ORAL 3 TIMES DAILY
Status: DISCONTINUED | OUTPATIENT
Start: 2021-03-02 | End: 2021-03-04 | Stop reason: HOSPADM

## 2021-03-02 RX ORDER — HYDROXYZINE HYDROCHLORIDE 25 MG/1
25 TABLET, FILM COATED ORAL EVERY 8 HOURS PRN
Status: DISCONTINUED | OUTPATIENT
Start: 2021-03-02 | End: 2021-03-04 | Stop reason: HOSPADM

## 2021-03-02 RX ORDER — SODIUM CHLORIDE 0.9 % (FLUSH) 0.9 %
10 SYRINGE (ML) INJECTION PRN
Status: DISCONTINUED | OUTPATIENT
Start: 2021-03-02 | End: 2021-03-04 | Stop reason: HOSPADM

## 2021-03-02 RX ORDER — PROPOFOL 10 MG/ML
INJECTION, EMULSION INTRAVENOUS PRN
Status: DISCONTINUED | OUTPATIENT
Start: 2021-03-02 | End: 2021-03-02 | Stop reason: SDUPTHER

## 2021-03-02 RX ORDER — ONDANSETRON 2 MG/ML
4 INJECTION INTRAMUSCULAR; INTRAVENOUS EVERY 6 HOURS PRN
Status: DISCONTINUED | OUTPATIENT
Start: 2021-03-02 | End: 2021-03-04 | Stop reason: HOSPADM

## 2021-03-02 RX ORDER — VENLAFAXINE HYDROCHLORIDE 37.5 MG/1
37.5 CAPSULE, EXTENDED RELEASE ORAL DAILY
Status: DISCONTINUED | OUTPATIENT
Start: 2021-03-02 | End: 2021-03-04 | Stop reason: HOSPADM

## 2021-03-02 RX ADMIN — PROPOFOL 200 MG: 10 INJECTION, EMULSION INTRAVENOUS at 12:24

## 2021-03-02 RX ADMIN — CEFAZOLIN SODIUM 2000 MG: 2 SOLUTION INTRAVENOUS at 12:13

## 2021-03-02 RX ADMIN — IOPAMIDOL 75 ML: 755 INJECTION, SOLUTION INTRAVENOUS at 18:19

## 2021-03-02 RX ADMIN — LIDOCAINE HYDROCHLORIDE 100 MG: 20 INJECTION, SOLUTION EPIDURAL; INFILTRATION; INTRACAUDAL; PERINEURAL at 12:24

## 2021-03-02 RX ADMIN — SODIUM CHLORIDE, POTASSIUM CHLORIDE, SODIUM LACTATE AND CALCIUM CHLORIDE: 600; 310; 30; 20 INJECTION, SOLUTION INTRAVENOUS at 11:36

## 2021-03-02 RX ADMIN — DIMENHYDRINATE 50 MG: 50 TABLET ORAL at 11:36

## 2021-03-02 RX ADMIN — ACETAMINOPHEN 650 MG: 325 TABLET ORAL at 11:36

## 2021-03-02 RX ADMIN — GABAPENTIN 300 MG: 300 CAPSULE ORAL at 20:59

## 2021-03-02 RX ADMIN — SODIUM CHLORIDE, POTASSIUM CHLORIDE, SODIUM LACTATE AND CALCIUM CHLORIDE: 600; 310; 30; 20 INJECTION, SOLUTION INTRAVENOUS at 12:55

## 2021-03-02 RX ADMIN — SODIUM CHLORIDE 1500 MG: 9 INJECTION, SOLUTION INTRAVENOUS at 19:37

## 2021-03-02 RX ADMIN — ESMOLOL HYDROCHLORIDE 50 MG: 10 INJECTION, SOLUTION INTRAVENOUS at 13:09

## 2021-03-02 RX ADMIN — SODIUM CHLORIDE: 9 INJECTION, SOLUTION INTRAVENOUS at 17:59

## 2021-03-02 RX ADMIN — FAMOTIDINE 20 MG: 20 TABLET ORAL at 20:59

## 2021-03-02 RX ADMIN — SODIUM CHLORIDE, PRESERVATIVE FREE 10 ML: 5 INJECTION INTRAVENOUS at 20:59

## 2021-03-02 RX ADMIN — FENTANYL CITRATE 100 MCG: 50 INJECTION, SOLUTION INTRAMUSCULAR; INTRAVENOUS at 12:17

## 2021-03-02 RX ADMIN — ONDANSETRON 4 MG: 2 INJECTION INTRAMUSCULAR; INTRAVENOUS at 12:30

## 2021-03-02 RX ADMIN — ALBUTEROL SULFATE 2.5 MG: 2.5 SOLUTION RESPIRATORY (INHALATION) at 13:37

## 2021-03-02 RX ADMIN — DEXAMETHASONE SODIUM PHOSPHATE 4 MG: 4 INJECTION, SOLUTION INTRAMUSCULAR; INTRAVENOUS at 12:30

## 2021-03-02 ASSESSMENT — LIFESTYLE VARIABLES: SMOKING_STATUS: 1

## 2021-03-02 ASSESSMENT — PAIN SCALES - GENERAL: PAINLEVEL_OUTOF10: 2

## 2021-03-02 NOTE — OP NOTE
Operative Note      Patient: Néstor Milligan  YOB: 1955  MRN: [id number]    DATE OF SURGERY: 3/2/2021    PREOPERATIVE DIAGNOSIS:  Right knee medial and lateral meniscus tear. POSTOPERATIVE DIAGNOSES:  Same        PROCEDURE:  Right knee arthroscopic partial lateral and partial medial menisectomy. SURGEON: Tacos Peguero MD      ANESTHESIA: General with local.      COMPLICATIONS: No surgical complications. However, during the surgery, the patient was noted to have atrial fibrillation with rapid ventricular response. Heart rate was in the 140's. The episode lasted for less than 1 minute before the patient converted back to sinus rhythm. ESTIMATED BLOOD LOSS: Minimal.      TOURNIQUET TIME: None      DISPOSITION: Stable to the recovery room. ARTHROSCOPIC FINDINGS:   Patellofemoral joint: There was grade 1 fibrillation of the patella. Diffuse grade 3 chondrosis of the trochlea. Patella tracked centrally  Intercondylar notch: ACL and PCL were intact. Lateral compartment: articular cartilage was in good condition with grade 1 chondrosis of the lateral femoral chondyle and lateral tibial plateau. There was complex tear of the anterior horn lateral meniscus. There was mild calcinosis of the lateral meniscus noted. Medial compartment: There was grade 2 chondrosis of the medial tibial plateau and medial femoral condyle. There was mild calcinosis of the medial meniscus with complex tear of the posterior horn of the medial meniscus. INDICATIONS FOR PROCEDURE: Patient presented with acute knee pain. Exam was concerning for a meniscus tear which was confirmed on MRI. Treatment options were discussed with the patient, who elected to undergo surgical intervention. Risks and benefits were discussed, and informed consent was obtained. DESCRIPTION OF PROCEDURE: Patient was identified in the preoperative holding area.   With the patient's agreement, the operative knee was marked as site of surgery. The patient was taken to the operating room and placed supine on the operating table. Prophylactic IV antibiotics were administered. General anesthesia was induced. A well-padded tourniquet was applied to the patient's thigh and the operative leg was placed into a circumferential leg dee. The foot of the bed was then dropped. A pillow was placed under the non-operative thigh. The operative lower extremity was then prepped and draped in usual sterile fashion. Preoperative time-out was taken to ensure the proper patient, surgical site and procedure. Anterolateral arthroscopic portal was then created. The arthroscope was placed in the knee joint allowing visualization of the intra-articular structures. An anteromedial portal was then created under direct visualization with a spinal needle used for localization. Care was taken to avoid injury to the anterior horn of the medial meniscus. Diagnostic arthroscopy was then performed. Please see the findings listed above. The shaver was then used to excise the torn fragments of lateral meniscus. The anterior horn was trimmed back to stable margin. Moving to the medial compartment, initial debridement of the torn meniscus was performed with shaver. Biter was then used to trim the posterior horn of the meniscus back to a stable margin. Erving Massy was used to remove meniscal fragments and ensure marginal stabilization with smooth transition. The knee was then copiously irrigated with arthroscopic fluid. The fluid was suctioned and the instruments were removed. Portals were closed with 3-0 nylon sutures. 0.5% ropivacaine was injected subcutaneously at the portal sites. Ten mL of 0.5% ropivacaine and 5 mg of Duramorph were then injected intra-articularly. Sterile dressing was applied. The operative drapes removed. The patient was then awoken from anesthesia without complications and removed from the operating room table.   Patient was taken to recovery room in stable condition.       Electronically signed by Charo Gautam MD on 3/2/2021 at 1:12 PM

## 2021-03-02 NOTE — PROGRESS NOTES
Right knee ace wrap dressing clean and dry. Discharge Criteria    Inpatients must meet Criteria 1 through 7. All other patients are either YES or N/A. If a NO is chosen then Anesthesia or Surgeon must be notified. 1.  Minimum 30 minutes after last dose of sedative medication, minimum 120 minutes after last dose of reversal agent. Yes      2. Systolic BP stable within 20 mmHg for 30 minutes & systolic BP between 90 & 812 or within 10 mmHg of baseline. Yes      3. Pulse between 60 and 100 or within 10 bpm of baseline. Yes      4. Spontaneous respiratory rate >/= 10 per minute. Yes      5. SaO2 >/= 95 or  >/= baseline. No, patient being admitted      6. Able to cough and swallow or return to baseline function. Yes      7. Alert and oriented or return to baseline mental status. Yes      8. Demonstrates controlled, coordinated movements, ambulates with steady gait, or return to baseline activity function. N/A      9. Minimal or no pain or nausea, or at a level tolerable and acceptable to patient. N/A      10. Takes and retains oral fluids as allowed. N/A      11. Procedural / perioperative site stable. Minimal or no bleeding. N/A          12. If GI endoscopy procedure, minimal or no abdominal distention or passing flatus. N/A      13. Written discharge instructions and emergency telephone number provided. N/A      14. Accompanied by a responsible adult.     N/A

## 2021-03-02 NOTE — PROGRESS NOTES
Dressing to right knee. Incisions closed with nylon and covered with adaptic and 4x4, cast padding and ace wrap applied.

## 2021-03-02 NOTE — PROGRESS NOTES
Anesthesia notified of SpO2 dropping to 87-93%, instructed to try and get her up and moving to see if SpO2 improves.

## 2021-03-02 NOTE — PROGRESS NOTES
Anesthesia came to see patient instructed patient still on 5L/min/NC and SpO2 still running around 90%

## 2021-03-02 NOTE — ANESTHESIA POSTPROCEDURE EVALUATION
Department of Anesthesiology  Postprocedure Note    Patient: Danielle Heard  MRN: [id number]  YOB: 1955  Date of evaluation: 3/2/2021  Time:  5:39 PM     Procedure Summary     Date: 03/02/21 Room / Location: [address] [place] [address]    Anesthesia Start: 4500 Anesthesia Stop: 1320    Procedure: KNEE ARTHROSCOPY-WITH MEDIAL AND LATERAL partial MENISCECTOMY (Right Knee) Diagnosis: (TEAR MEDIAL & LATERAL MENISCUS)    Surgeons: Livia Meckel, MD Responsible Provider: CYRUS Mckeon CRNA    Anesthesia Type: general ASA Status: 3          Anesthesia Type: general    Marcy Phase I: Marcy Score: 9    Marcy Phase II: Marcy Score: 9    Last vitals: Reviewed and per EMR flowsheets. Anesthesia Post Evaluation    Patient location during evaluation: PACU  Patient participation: complete - patient participated  Level of consciousness: awake and alert  Pain score: 0  Airway patency: patent (Pt sitting up in chair on 5L NC, unable to wean to acceptable sat)  Nausea & Vomiting: no vomiting and no nausea  Complications: yes  Specific complications: unexpected post-op hospitalization  Cardiovascular status: blood pressure returned to baseline  Respiratory status: nasal cannula  Hydration status: stable  Comments: Spoke with Dr Meka Bates regarding inability to get patient weaned from [facility]. On 5L NC, sitting up in chair, diminished lungs, denies complaints, denies pleuritic pain, vitals stable otherwise and have returned from baseline. Incentive spirometry and nebulizer initiated without improvement. Per Dr Meka Bates will admit.

## 2021-03-02 NOTE — PROGRESS NOTES
Updated Dr. Clint Pratt and Dominique Shaw CRNA on patient's SPO2 and lung sounds. Breathing treatment ordered.

## 2021-03-02 NOTE — PROGRESS NOTES
[facility]  OCCUPATIONAL THERAPY  No Visit Note    [] ICU    [x] Acute   Patient: Mark Gamez  Room: 0384/2428-29      Mark Gamez not seen on 3/2/2021 at 5:35 PM. RN present and applying telemetry monitor.  RN states pt also has visitors waiting and requested OT re-attempt evaluation in AM.          Signature: Meghan Phi, MOT, OTR/L

## 2021-03-02 NOTE — PROGRESS NOTES
rhonchi []  Persistentwheezes and, or absent BBS 1   Cough [x]  Strong, effective, & non-prod. []  Effective & prod. Less than 25 ml (2 TBSP) over past 24 hrs []  Ineffective & non-prod to less than 25 ML over past 24 hrs []  Ineffective and, or greater than 25 ml sputum prod. past 24 hrs. []  Nonspon- taneous; Requires suctioning 0   Pulmonary History  (PULM HX) []  No smoking and no chronic pulmonaryhistory []  Former smoker. Quit over 12 mos. ago []  Current smoker or quit w/ in 12 mos [x]  Pulm. History and, or 20 pk/yr smoking hx []  Admitted w/ acute pulm. dx and, or has been admitted w/ pulm. dx 2 or more times over past 12 mos 3   Surgical History this Admit  (SURG HX) []  No surgery [x]  General surgery []  Lower abdominal []  Thoracic or upper abdominal   []  Thoracic w/ pulm. disease 1   Chest X-Ray (CXR)/CT Scan [x]  Clear or not applicable []  Not available []  Atelect- asis or pleural effusions []  Localized infiltrate or pulm. edema []  Con-solidated Infiltrates, bilateral, or in more than 1 lobe 0   Slow or Forced VC, FEV1 OR PEFR (PULM FXN)  [x]  80% or greater, or not indicated []  Pt. unable to perform []  FEV1 or PEFR or VC 51-79%. []  FEV1 or PEFR or VC  30-49%   []  FEV1 or PEFR or VC less than 30%   0   TOTAL ACUITY: 5       CARE PLAN    If Acuity Level is 2, 3, or 4 in any of the following:    [] BILATERAL BREATH SOUNDS (BBS)     [x] PULMONARY HISTORY (PULM HX)  [] PULMONARY FUNCTION (PULM FX)    Goal: Improve respiratory functions in patients with airway disease and decrease WOB    [x] AEROSOL PROTOCOL    Total Acuity:   16-32  []  Secondary Assessment in 24 hrs Total Acuity:  9-15  []  Secondary Assessment in 24 hrs Total Acuity:  4-8  [x]  Secondary Assessment in 48 hrs Total Acuity:  0-3  []  Secondary Assessment in 72 hrs   HHN AEROSOL THERAPY with  [physician-ordered bronchodilator(s)] q 4 & Albuterol PRN q2 hrs. Breath-Actuated Neb if BBS Acuity = 4, and pt. can use MP.  Notify physician if condition deteriorates. [facility]N AEROSOL THERAPY with  [physician-ordered bronchodilator(s)]  QID and Albuterol PRN q4 hrs. Breath-Actuated Neb if BBS Acuity = 4, and pt. can use MP. Notify physician if condition deteriorates. MDI THERAPY with  2 actuations of [physician-ordered bronchodilator(s)] via spacer TID Albuterol and PRNq4 hrs. If unable to utilize MDI: HHN [physician-ordered bronchodilator(s)] TID and Albuterol PRN q4 hrs. Notify physician if condition deteriorates. MDI THERAPY with  [physician-ordered bronchodilator(s)] via spacer TID PRN. If unable to utilize MDI: HHN [physician-ordered bronchodilator(s)] TID PRN. Notify physician if condition deteriorates. If Acuity Level is 2, 3, or 4 in any of the following:    [] COUGH     [] SURGICAL HISTORY (SURG HX)  [] CHEST XRAY (CXR)    Goal: Improvement in sputum mobilization in patients with ineffective airway clearance. Reverse atelectasis. [] Bronchopulmonary Hygiene Protocol    Total Acuity:   16-32  []  Secondary Assessment in 24 hrs Total Acuity:  9-15  []  Secondary Assessment in 24 hrs Total Acuity:  4-8  []  Secondary Assessment in 48 hrs Total Acuity:  0-3  []  Secondary Assessment in 72 hrs   METANEB QID with [physician-ordered bronchodilator(s)] if CXR Acuity = 4; otherwise:  PD&P, PEP, or Vest QID & PRN  NT Sxn PRN for ineffective cough  METANEB QID with [physician-ordered bronchodilator(s)] if CXR Acuity = 4; otherwise:  PD&P, PEP, or Vest TID & PRN  NT Sxn PRN for ineffective cough  Instruct patient to self-perform IS q1hr WA   Directed Cough self-performed q1hr WA     If Acuity Level is 2 or above in the following:    [] PULMONARY HISTORY (PULM HX)    Goal: Assist patient in quitting smoking to slow or stop the progression of lung disease.     [] Smoking Cessation Protocol    SMOKING CESSATION EDUCATION provided according to policy HO_716: (felicita with an X)  ____Yes    ____ No     ____ NA    Smoking Cessation

## 2021-03-02 NOTE — H&P
[address] [facility]  History and Physical        Patient:  Danielle Heard  MRN: [id number]    Chief Complaint:    No chief complaint on file. History Obtained From:  patient, electronic medical record  PCP: CYRUS Gil CNP    History of Present Illness: The patient is a 78 y.o. female who presents with afib with rvr intraoperatively. She had rt TKA and was observed to have afib with rvr,hypoxia and she converted to sinus rhythm spontaneously but remained hypoxic. She is requiring 4 lit oxygen by nasal cannula. She had h/o smoking a pack daily and has h/o mariangel but not on c pap. Past Medical History:        Diagnosis Date    COPD (chronic obstructive pulmonary disease) ([facility] [address]     Hyperlipidemia     Hyperthyroidism     Hypothyroidism     Obesity     Osteoarthritis     Sleep apnea     Tobacco abuse     Type II or unspecified type diabetes mellitus without mention of complication, not stated as uncontrolled        Past Surgical History:        Procedure Laterality Date    HEEL SPUR SURGERY      right heel    KNEE ARTHROSCOPY Right 03/02/2021    partial medial meniscetomy    TONSILLECTOMY      TOOTH EXTRACTION      All of back teeth    TUBAL LIGATION         Medications Prior to Admission:    Prior to Admission medications    Medication Sig Start Date End Date Taking? Authorizing Provider   aspirin  MG EC tablet Take 1 tablet by mouth daily 3/2/21  Yes Livia Meckel, MD   gabapentin (NEURONTIN) 300 MG capsule Take 1 capsule by mouth three times daily for 90 days.  2/19/21 5/20/21 Yes CYRUS Gil CNP   tiZANidine (ZANAFLEX) 4 MG tablet Take 1 tablet by mouth every 8 hours as needed (Back pain) 2/11/21  Yes Virgil Jean-Baptiste MD   acetaminophen (TYLENOL) 325 MG tablet Take 2 tablets by mouth every 8 hours as needed for Pain 2/11/21  Yes Virgil Jean-Baptiste MD   etodolac (LODINE) 400 MG tablet Take 1 tablet by mouth 2 times daily 1/11/21  Yes CYRUS Gil CNP hydrOXYzine (ATARAX) 25 MG tablet Take 1 tablet by mouth every 8 hours as needed for Anxiety 12/29/20 3/29/21 Yes [facility] Might, APRN - CNP   lisinopril (PRINIVIL;ZESTRIL) 2.5 MG tablet TAKE ONE TABLET BY MOUTH DAILY 12/28/20  Yes [facility] Might, APRN - CNP   levothyroxine (SYNTHROID) 175 MCG tablet TAKE ONE TABLET BY MOUTH DAILY 12/28/20  Yes [facility] Might, APRN - CNP   venlafaxine (EFFEXOR XR) 37.5 MG extended release capsule Take 1 capsule by mouth daily 12/28/20  Yes [facility] Might, APRN - CNP   atorvastatin (LIPITOR) 40 MG tablet TAKE ONE TABLET BY MOUTH DAILY 12/28/20  Yes [facility] Might, APRN - CNP   metFORMIN (GLUCOPHAGE-XR) 500 MG extended release tablet Take 2 tablets by mouth daily (with breakfast) 12/28/20  Yes [facility] Might, APRN - CNP   SITagliptin (JANUVIA) 100 MG tablet Take 1 tablet by mouth daily 6/25/20  Yes [facility] Might, APRN - MU   albuterol sulfate HFA (PROAIR HFA) 108 (90 Base) MCG/ACT inhaler Inhale 2 puffs into the lungs every 4-6 hours as needed for Wheezing 4/9/20  Yes [facility] Might, APRN - CNP   metoprolol (LOPRESSOR) 25 MG tablet Take 0.5 tablets by mouth 2 times daily 3/2/21   [facility] Might, APRN - CNP   Elastic Bandages & Supports (KNEE BRACE/HINGED BARS MEDIUM) MISC 1 each by Does not apply route daily 1/11/21   [facility] Might, CYRUS - CNP   Blood Glucose Monitoring Suppl (TRUE METRIX METER) W/DEVICE KIT 1 each by Does not apply route daily 2/8/17   [facility] Might, CYRUS - MU   glucose blood VI test strips (TRUE METRIX BLOOD GLUCOSE TEST) strip 1 each by In Vitro route daily 2/8/17   [facility], CYRUS - CNP   Lancets MISC 1 each by Does not apply route daily 2/8/17   [facility] Might, APRN - CNP       Allergies:  Patient has no known allergies. Social History:   TOBACCO:   reports that she has been smoking. She has a 40.00 pack-year smoking history. She has never used smokeless tobacco.  ETOH:   reports no history of alcohol use.     Family History:       Problem Relation Age of Onset  Cancer Father     Diabetes Sister     Diabetes Brother     Diabetes Paternal Grandmother        Review of Systems:  Constitutional:negative  for fevers, and negative for chills. Respiratory: positive for shortness of breath, negative for cough, and negative for wheezing  Cardiovascular: negative for chest pain, negative for palpitations, and negative for syncope  Gastrointestinal: negative for abdominal pain, negative for nausea,negative for vomiting, negative for diarrhea, negative for constipation, and negative for hematochezia or melena  Genitourinary: negative for dysuria, negative for urinary urgency, negative for urinary frequency, and negative for hematuria  Neurological: negative for unilateral weakness, numbness or tingling. All other systems were reviewed with the patient and are negative except as stated    Objective:    Vitals:   Temp: 97.6 °F (36.4 °C)  BP: 125/63  Resp: 16  Pulse: 73  SpO2: 94 %  -----------------------------------------------------------------  Exam:  GEN:    Awake, alert and oriented x3. Obese  EYES:  EOMI, pupils equal   NECK: Supple. No lymphadenopathy. No carotid bruit  CVS:    regular rate and rhythm, no audible murmur  PULM:  diminished but clear without wheezing, rales or rhonchi, no acute respiratory distress  ABD:    Bowels sounds normal.  Abdomen is soft. No distention. no tenderness to palpation. EXT:   no edema bilaterally . No calf tenderness. NEURO: Moves all extremities. Motor and sensory are grossly intact  SKIN:  No rashes.   No skin lesions.    -----------------------------------------------------------------  Diagnostic Data:   · All diagnostic data was reviewed  Lab Results   Component Value Date    WBC 9.0 02/19/2021    HGB 13.7 02/19/2021    MCV 86.7 02/19/2021     02/19/2021      Lab Results   Component Value Date    GLUCOSE 244 (H) 03/02/2021    BUN 9 03/02/2021    CREATININE 0.56 03/02/2021     (L) 03/02/2021    K 3.6 (L) 03/02/2021    CALCIUM 9.1 03/02/2021    CL 96 (L) 03/02/2021    CO2 30 03/02/2021     Lab Results   Component Value Date    WBCUA None 06/07/2016    RBCUA None 06/07/2016    EPITHUA 5 TO 10 06/07/2016    LEUKOCYTESUR NEGATIVE 06/07/2016    SPECGRAV <1.005 (L) 06/07/2016    GLUCOSEU NEGATIVE 06/07/2016    KETUA NEGATIVE 06/07/2016    PROTEINU NEGATIVE 06/07/2016    HGBUR NEGATIVE 06/07/2016    CASTUA NOT REPORTED 06/07/2016    CRYSTUA NOT REPORTED 06/07/2016    BACTERIA NOT REPORTED 06/07/2016    YEAST NOT REPORTED 06/07/2016       Assessment:     Principal Problem:    Atrial fibrillation with RVR ([facility])  Active Problems:    Diabetes type 2, controlled ([facility])    Obesity (BMI 30-39. 9)    Essential hypertension    Tobacco abuse    Morbidly obese ([facility])    Hypoxia  Resolved Problems:    * No resolved hospital problems. *      Plan:     · This patient requires inpatient admission because of afib with rvr to monitor her arrythmia. She has converted spontaneously and has no previous h/o afib. Will have echo,cardiology eval.  · Factors affecting the medical complexity of this patient include hypoxia- multifactorial. Will r/o pe and continue oxygen  · Type 2 dm- monitor blood sugars  · htn- monitor bp   · Estimated length of stay is 2 days  · Discussed patient's symptoms and data results including labs and imaging studies with the ER MD at time of admission  · High risk drug monitoring: none  ·     CORE MEASURES  · DVT prophylaxis: Lovenox  · Decubitus ulcer present on admission: No  · CODE STATUS: FULL CODE  · Nutrition Status: good   · Physical therapy: Yes   · Old Charts reviewed: Yes  · EKG Reviewed:  Yes  · Advance Directive Addressed: Yes    Valente Cohn M.D.  3/2/2021  6:27 PM   Addendum  Ct chest with contrast indicative of aspiration pneumonia.  will start unasyn and observe respiratory status  Amando Isabel

## 2021-03-02 NOTE — ANESTHESIA PRE PROCEDURE
Department of Anesthesiology  Preprocedure Note       Name:  Yanely Patterson   Age:  78 y.o.  :  1955                                          MRN:  [id number]         Date:  3/2/2021      Surgeon: Ashli Valadez):  Cheyanne Chavarria MD    Procedure: Procedure(s):  KNEE ARTHROSCOPY-WITH MEDIAL AND LATERAL MENISCECTOMY    Medications prior to admission:   Prior to Admission medications    Medication Sig Start Date End Date Taking? Authorizing Provider   aspirin  MG EC tablet Take 1 tablet by mouth daily 3/2/21  Yes Cheyanne Chavarria MD   gabapentin (NEURONTIN) 300 MG capsule Take 1 capsule by mouth three times daily for 90 days.  21 Yes [facility] CYRUS Johnson CNP   tiZANidine (ZANAFLEX) 4 MG tablet Take 1 tablet by mouth every 8 hours as needed (Back pain) 21  Yes Petra Gamboa MD   acetaminophen (TYLENOL) 325 MG tablet Take 2 tablets by mouth every 8 hours as needed for Pain 21  Yes Petra Gamboa MD   etodolac (LODINE) 400 MG tablet Take 1 tablet by mouth 2 times daily 21  Yes [facility] CYRUS Johnson CNP   hydrOXYzine (ATARAX) 25 MG tablet Take 1 tablet by mouth every 8 hours as needed for Anxiety 12/29/20 3/29/21 Yes [facility] CYRUS Johnson - CNP   lisinopril (PRINIVIL;ZESTRIL) 2.5 MG tablet TAKE ONE TABLET BY MOUTH DAILY 20  Yes [facility] CYRUS Johnson - CNP   levothyroxine (SYNTHROID) 175 MCG tablet TAKE ONE TABLET BY MOUTH DAILY 20  Yes [facility] Elizabeth APRN - CNP   venlafaxine (EFFEXOR XR) 37.5 MG extended release capsule Take 1 capsule by mouth daily 20  Yes [facility] CYRUS Johnson - CNP   atorvastatin (LIPITOR) 40 MG tablet TAKE ONE TABLET BY MOUTH DAILY 20  Yes [facility] CYRUS Johnson - CNP   metFORMIN (GLUCOPHAGE-XR) 500 MG extended release tablet Take 2 tablets by mouth daily (with breakfast) 20  Yes [facility] CYRUS Johnson - CNP   SITagliptin (JANUVIA) 100 MG tablet Take 1 tablet by mouth daily 20  Yes [facility] Might, APRN - CNP   albuterol sulfate HFA (PROAIR HFA) 108 (90 Base) MCG/ACT inhaler Inhale 2 puffs into the lungs every 4-6 hours as needed for Wheezing 4/9/20  Yes CYRUS Foster CNP   Elastic Bandages & Supports (KNEE BRACE/HINGED BARS MEDIUM) MISC 1 each by Does not apply route daily 1/11/21   CYRUS Foster CNP   Blood Glucose Monitoring Suppl (TRUE METRIX METER) W/DEVICE KIT 1 each by Does not apply route daily 2/8/17   CYRUS Foster CNP   glucose blood VI test strips (TRUE METRIX BLOOD GLUCOSE TEST) strip 1 each by In Vitro route daily 2/8/17   CYRUS Foster CNP   Lancets MISC 1 each by Does not apply route daily 2/8/17   CYRUS Foster CNP       Current medications:    Current Facility-Administered Medications   Medication Dose Route Frequency Provider Last Rate Last Admin    ceFAZolin (ANCEF) 2000 mg in dextrose 3 % 50 mL IVPB (duplex)  2,000 mg Intravenous Once Mortimer Bodo, MD        lactated ringers infusion   Intravenous Continuous Mortimer Bodo, MD        lactated ringers infusion   Intravenous Continuous Mortimer Bodo,  mL/hr at 03/02/21 1136 New Bag at 03/02/21 1136    sodium chloride flush 0.9 % injection 10 mL  10 mL Intravenous 2 times per day Mortimer Bodo, MD        sodium chloride flush 0.9 % injection 10 mL  10 mL Intravenous PRN Mortimer Bodo, MD           Allergies:  No Known Allergies    Problem List:    Patient Active Problem List   Diagnosis Code    Dermatophytosis of foot B35.3    Diabetes type 2, controlled ([facility] [address] E11.9    Dyslipidemia E78.5    Hypothyroid E03.9    Obesity (BMI 30-39. 9) E66.9    Essential hypertension I10    Noncompliance with therapeutic plan Z91.11    Tobacco abuse Z72.0    Positive FIT (fecal immunochemical test) R19.5    Morbidly obese (HCC) E66.01       Past Medical History:        Diagnosis Date    COPD (chronic obstructive pulmonary disease) (HCC)     Hyperlipidemia     Hyperthyroidism     Hypothyroidism     Obesity     Osteoarthritis     Sleep apnea     Tobacco abuse     Type II or unspecified type diabetes mellitus without mention of complication, not stated as uncontrolled        Past Surgical History:        Procedure Laterality Date    HEEL SPUR SURGERY      right heel    TONSILLECTOMY      TOOTH EXTRACTION      All of back teeth    TUBAL LIGATION         Social History:    Social History     Tobacco Use    Smoking status: Current Every Day Smoker     Packs/day: 1.00     Years: 40.00     Pack years: 40.00    Smokeless tobacco: Never Used    Tobacco comment: smoked on the way to surgery 03/02/21   Substance Use Topics    Alcohol use: No     Alcohol/week: 0.0 standard drinks                                Ready to quit: Not Answered  Counseling given: Not Answered  Comment: smoked on the way to surgery 03/02/21      Vital Signs (Current):   Vitals:    02/25/21 1045 03/02/21 1130   BP:  (!) 134/55   Pulse:  72   Resp:  18   Temp:  36.7 °C (98 °F)   TempSrc:  Temporal   SpO2:  94%   Weight: 220 lb (99.8 kg) 220 lb (99.8 kg)   Height: 5' 4\" (1.626 m) 5' 4\" (1.626 m)                                              BP Readings from Last 3 Encounters:   03/02/21 (!) 134/55   02/19/21 132/78   02/11/21 (!) 130/49       NPO Status: Time of last liquid consumption: 2355(sips at 0800 today with thyroid meds)                        Time of last solid consumption: 2355                        Date of last liquid consumption: 03/01/21                        Date of last solid food consumption: 03/01/21    BMI:   Wt Readings from Last 3 Encounters:   03/02/21 220 lb (99.8 kg)   02/19/21 225 lb 8 oz (102.3 kg)   02/11/21 220 lb (99.8 kg)     Body mass index is 37.76 kg/m².     CBC:   Lab Results   Component Value Date    WBC 9.0 02/19/2021    RBC 4.96 02/19/2021    RBC 4.83 05/24/2012    HGB 13.7 02/19/2021    HCT 43.0 02/19/2021    MCV 86.7 02/19/2021    RDW 13.4 02/19/2021     02/19/2021     05/24/2012       CMP:   Lab Results   Component Value Date     02/19/2021    K 3.7 02/19/2021    CL 99 02/19/2021    CO2 30 02/19/2021    BUN 5 02/19/2021    CREATININE 0.60 02/19/2021    GFRAA >60 02/19/2021    LABGLOM >60 02/19/2021    GLUCOSE 127 02/19/2021    GLUCOSE 103 05/24/2012    PROT 7.5 10/09/2019    CALCIUM 9.5 02/19/2021    BILITOT 0.35 10/09/2019    ALKPHOS 132 10/09/2019    AST 15 12/29/2020    ALT 15 12/29/2020       POC Tests:   Recent Labs     03/02/21  1137   POCGLU 119*       Coags: No results found for: PROTIME, INR, APTT    HCG (If Applicable): No results found for: PREGTESTUR, PREGSERUM, HCG, HCGQUANT     ABGs: No results found for: PHART, PO2ART, MCD1VYJ, FMX1MUM, BEART, O3OOHVFF     Type & Screen (If Applicable):  No results found for: LABABO, LABRH    Drug/Infectious Status (If Applicable):  Lab Results   Component Value Date    HEPCAB NONREACTIVE 05/31/2016       COVID-19 Screening (If Applicable):   Lab Results   Component Value Date    COVID19 Not Detected 02/26/2021         Anesthesia Evaluation  Patient summary reviewed and Nursing notes reviewed no history of anesthetic complications:   Airway: Mallampati: III  TM distance: >3 FB   Neck ROM: full  Mouth opening: > = 3 FB Dental:    (+) upper dentures      Pulmonary:normal exam  breath sounds clear to auscultation  (+) COPD:  sleep apnea:  current smoker          Patient smoked on day of surgery. Cardiovascular:  Exercise tolerance: poor (<4 METS),   (+) hypertension: moderate, hyperlipidemia        Rhythm: regular  Rate: normal           Beta Blocker:  Not on Beta Blocker         Neuro/Psych:   Negative Neuro/Psych ROS              GI/Hepatic/Renal:   (+) morbid obesity          Endo/Other:    (+) DiabetesType II DM, well controlled, , hypothyroidism: arthritis:., .                 Abdominal:   (+) obese,         Vascular: negative vascular ROS. Anesthesia Plan      general     ASA 3       Induction: intravenous.     MIPS: Postoperative opioids intended and

## 2021-03-03 PROBLEM — Z98.890 S/P ARTHROSCOPIC PARTIAL MEDIAL MENISCECTOMY: Status: ACTIVE | Noted: 2021-03-03

## 2021-03-03 LAB
ABSOLUTE EOS #: 0.03 K/UL (ref 0–0.44)
ABSOLUTE IMMATURE GRANULOCYTE: 0.05 K/UL (ref 0–0.3)
ABSOLUTE LYMPH #: 1.71 K/UL (ref 1.1–3.7)
ABSOLUTE MONO #: 0.95 K/UL (ref 0.1–1.2)
ANION GAP SERPL CALCULATED.3IONS-SCNC: 6 MMOL/L (ref 9–17)
BASOPHILS # BLD: 0 % (ref 0–2)
BASOPHILS ABSOLUTE: 0.05 K/UL (ref 0–0.2)
BUN BLDV-MCNC: 8 MG/DL (ref 8–23)
BUN/CREAT BLD: 14 (ref 9–20)
CALCIUM SERPL-MCNC: 8.8 MG/DL (ref 8.6–10.4)
CHLORIDE BLD-SCNC: 95 MMOL/L (ref 98–107)
CO2: 30 MMOL/L (ref 20–31)
CREAT SERPL-MCNC: 0.57 MG/DL (ref 0.5–0.9)
DIFFERENTIAL TYPE: ABNORMAL
EKG ATRIAL RATE: 47 BPM
EKG ATRIAL RATE: 75 BPM
EKG P AXIS: 62 DEGREES
EKG P AXIS: 63 DEGREES
EKG P-R INTERVAL: 142 MS
EKG P-R INTERVAL: 150 MS
EKG Q-T INTERVAL: 420 MS
EKG Q-T INTERVAL: 478 MS
EKG QRS DURATION: 80 MS
EKG QRS DURATION: 80 MS
EKG QTC CALCULATION (BAZETT): 423 MS
EKG QTC CALCULATION (BAZETT): 469 MS
EKG R AXIS: 57 DEGREES
EKG R AXIS: 62 DEGREES
EKG T AXIS: 103 DEGREES
EKG T AXIS: 89 DEGREES
EKG VENTRICULAR RATE: 47 BPM
EKG VENTRICULAR RATE: 75 BPM
EOSINOPHILS RELATIVE PERCENT: 0 % (ref 1–4)
GFR AFRICAN AMERICAN: >60 ML/MIN
GFR NON-AFRICAN AMERICAN: >60 ML/MIN
GFR SERPL CREATININE-BSD FRML MDRD: ABNORMAL ML/MIN/{1.73_M2}
GFR SERPL CREATININE-BSD FRML MDRD: ABNORMAL ML/MIN/{1.73_M2}
GLUCOSE BLD-MCNC: 154 MG/DL (ref 74–100)
GLUCOSE BLD-MCNC: 154 MG/DL (ref 74–100)
GLUCOSE BLD-MCNC: 163 MG/DL (ref 70–99)
GLUCOSE BLD-MCNC: 169 MG/DL (ref 74–100)
GLUCOSE BLD-MCNC: 216 MG/DL (ref 74–100)
HCT VFR BLD CALC: 37.7 % (ref 36.3–47.1)
HEMOGLOBIN: 11.8 G/DL (ref 11.9–15.1)
IMMATURE GRANULOCYTES: 0 %
LYMPHOCYTES # BLD: 15 % (ref 24–43)
MAGNESIUM: 1.9 MG/DL (ref 1.6–2.6)
MCH RBC QN AUTO: 27.3 PG (ref 25.2–33.5)
MCHC RBC AUTO-ENTMCNC: 31.3 G/DL (ref 28.4–34.8)
MCV RBC AUTO: 87.3 FL (ref 82.6–102.9)
MONOCYTES # BLD: 8 % (ref 3–12)
NRBC AUTOMATED: 0 PER 100 WBC
PDW BLD-RTO: 13.2 % (ref 11.8–14.4)
PLATELET # BLD: 315 K/UL (ref 138–453)
PLATELET ESTIMATE: ABNORMAL
PMV BLD AUTO: 10.6 FL (ref 8.1–13.5)
POTASSIUM SERPL-SCNC: 3.5 MMOL/L (ref 3.7–5.3)
RBC # BLD: 4.32 M/UL (ref 3.95–5.11)
RBC # BLD: ABNORMAL 10*6/UL
SEG NEUTROPHILS: 77 % (ref 36–65)
SEGMENTED NEUTROPHILS ABSOLUTE COUNT: 8.77 K/UL (ref 1.5–8.1)
SODIUM BLD-SCNC: 131 MMOL/L (ref 135–144)
WBC # BLD: 11.6 K/UL (ref 3.5–11.3)
WBC # BLD: ABNORMAL 10*3/UL

## 2021-03-03 PROCEDURE — 93005 ELECTROCARDIOGRAM TRACING: CPT | Performed by: NURSE PRACTITIONER

## 2021-03-03 PROCEDURE — 93010 ELECTROCARDIOGRAM REPORT: CPT | Performed by: FAMILY MEDICINE

## 2021-03-03 PROCEDURE — 2580000003 HC RX 258: Performed by: FAMILY MEDICINE

## 2021-03-03 PROCEDURE — 83735 ASSAY OF MAGNESIUM: CPT

## 2021-03-03 PROCEDURE — 6360000002 HC RX W HCPCS: Performed by: FAMILY MEDICINE

## 2021-03-03 PROCEDURE — 36415 COLL VENOUS BLD VENIPUNCTURE: CPT

## 2021-03-03 PROCEDURE — 97530 THERAPEUTIC ACTIVITIES: CPT

## 2021-03-03 PROCEDURE — 80048 BASIC METABOLIC PNL TOTAL CA: CPT

## 2021-03-03 PROCEDURE — 1200000000 HC SEMI PRIVATE

## 2021-03-03 PROCEDURE — 82947 ASSAY GLUCOSE BLOOD QUANT: CPT

## 2021-03-03 PROCEDURE — 85025 COMPLETE CBC W/AUTO DIFF WBC: CPT

## 2021-03-03 PROCEDURE — 99222 1ST HOSP IP/OBS MODERATE 55: CPT | Performed by: FAMILY MEDICINE

## 2021-03-03 PROCEDURE — 6370000000 HC RX 637 (ALT 250 FOR IP): Performed by: NURSE PRACTITIONER

## 2021-03-03 PROCEDURE — 94761 N-INVAS EAR/PLS OXIMETRY MLT: CPT

## 2021-03-03 PROCEDURE — 2580000003 HC RX 258: Performed by: NURSE PRACTITIONER

## 2021-03-03 PROCEDURE — 2700000000 HC OXYGEN THERAPY PER DAY

## 2021-03-03 PROCEDURE — 97166 OT EVAL MOD COMPLEX 45 MIN: CPT

## 2021-03-03 PROCEDURE — 97162 PT EVAL MOD COMPLEX 30 MIN: CPT

## 2021-03-03 PROCEDURE — 97110 THERAPEUTIC EXERCISES: CPT

## 2021-03-03 PROCEDURE — 97535 SELF CARE MNGMENT TRAINING: CPT

## 2021-03-03 RX ORDER — POTASSIUM CHLORIDE 20 MEQ/1
40 TABLET, EXTENDED RELEASE ORAL ONCE
Status: COMPLETED | OUTPATIENT
Start: 2021-03-03 | End: 2021-03-03

## 2021-03-03 RX ADMIN — SODIUM CHLORIDE 1500 MG: 9 INJECTION, SOLUTION INTRAVENOUS at 01:50

## 2021-03-03 RX ADMIN — LEVOTHYROXINE SODIUM 175 MCG: 150 TABLET ORAL at 09:03

## 2021-03-03 RX ADMIN — SODIUM CHLORIDE, PRESERVATIVE FREE 10 ML: 5 INJECTION INTRAVENOUS at 19:45

## 2021-03-03 RX ADMIN — LISINOPRIL 2.5 MG: 2.5 TABLET ORAL at 09:02

## 2021-03-03 RX ADMIN — GABAPENTIN 300 MG: 300 CAPSULE ORAL at 20:20

## 2021-03-03 RX ADMIN — ATORVASTATIN CALCIUM 40 MG: 40 TABLET, FILM COATED ORAL at 09:03

## 2021-03-03 RX ADMIN — SODIUM CHLORIDE, PRESERVATIVE FREE 10 ML: 5 INJECTION INTRAVENOUS at 09:01

## 2021-03-03 RX ADMIN — SODIUM CHLORIDE 1500 MG: 9 INJECTION, SOLUTION INTRAVENOUS at 18:58

## 2021-03-03 RX ADMIN — ACETAMINOPHEN 650 MG: 325 TABLET ORAL at 01:53

## 2021-03-03 RX ADMIN — METFORMIN HYDROCHLORIDE 1000 MG: 500 TABLET, EXTENDED RELEASE ORAL at 09:02

## 2021-03-03 RX ADMIN — GABAPENTIN 300 MG: 300 CAPSULE ORAL at 13:43

## 2021-03-03 RX ADMIN — FAMOTIDINE 20 MG: 20 TABLET ORAL at 09:03

## 2021-03-03 RX ADMIN — GABAPENTIN 300 MG: 300 CAPSULE ORAL at 09:02

## 2021-03-03 RX ADMIN — SODIUM CHLORIDE 1500 MG: 9 INJECTION, SOLUTION INTRAVENOUS at 13:43

## 2021-03-03 RX ADMIN — FAMOTIDINE 20 MG: 20 TABLET ORAL at 20:21

## 2021-03-03 RX ADMIN — ACETAMINOPHEN 650 MG: 325 TABLET ORAL at 15:54

## 2021-03-03 RX ADMIN — ALOGLIPTIN 25 MG: 25 TABLET, FILM COATED ORAL at 09:02

## 2021-03-03 RX ADMIN — ENOXAPARIN SODIUM 40 MG: 40 INJECTION SUBCUTANEOUS at 09:01

## 2021-03-03 RX ADMIN — SODIUM CHLORIDE 1500 MG: 9 INJECTION, SOLUTION INTRAVENOUS at 09:01

## 2021-03-03 RX ADMIN — VENLAFAXINE HYDROCHLORIDE 37.5 MG: 37.5 CAPSULE, EXTENDED RELEASE ORAL at 09:03

## 2021-03-03 RX ADMIN — POTASSIUM CHLORIDE 40 MEQ: 1500 TABLET, EXTENDED RELEASE ORAL at 09:03

## 2021-03-03 ASSESSMENT — PAIN DESCRIPTION - DESCRIPTORS: DESCRIPTORS: ACHING;SORE

## 2021-03-03 ASSESSMENT — PAIN DESCRIPTION - LOCATION
LOCATION: KNEE
LOCATION: KNEE

## 2021-03-03 NOTE — PROGRESS NOTES
Assessment and navigator done at this time. Patient is A&Ox4 and remains on 4L of O2. Patient is able to move her right leg and denies any tingling but states she has decreased sensation in her leg still. Patient denies any pain at this time. Bed is in the lowest postion, bed alarm on, and call light within reach.

## 2021-03-03 NOTE — PROGRESS NOTES
Physical Therapy    Facility/Department: [address] [place] [address] MED SURG  Initial Assessment    NAME: Mary Tobar  : 1955  MRN: [id number]    Date of Service: 3/3/2021    Discharge Recommendations:  Continue to assess pending progress, Patient would benefit from continued therapy after discharge        Assessment   Body structures, Functions, Activity limitations: Decreased functional mobility ; Increased pain;Decreased ADL status; Decreased balance;Decreased ROM; Decreased strength;Decreased high-level IADLs;Decreased endurance  Assessment: Pt is a 77 y/o female admitted for hypoxia. Pt has R knee partial medial and lateral meniscectomy yesterday, 3/2/2021. Pt currently demonstrates decreased RLE ROM and strength, and requries increased level of assistance to complete ADLs and functional tasks. Pt will benefit from skilled PT services to improve strength, endurance and overall functional mobility to allow for safe return home. Treatment Diagnosis: generalized weakness  Prognosis: Good  Decision Making: Medium Complexity  PT Education: PT Role;Plan of Care;General Safety  REQUIRES PT FOLLOW UP: Yes  Activity Tolerance  Activity Tolerance: Patient Tolerated treatment well       Patient Diagnosis(es): There were no encounter diagnoses. has a past medical history of COPD (chronic obstructive pulmonary disease) ([facility] [address] Hyperlipidemia, Hyperthyroidism, Hypothyroidism, Obesity, Osteoarthritis, Sleep apnea, Tobacco abuse, and Type II or unspecified type diabetes mellitus without mention of complication, not stated as uncontrolled. has a past surgical history that includes Heel spur surgery; Tonsillectomy; Tubal ligation; Tooth Extraction; and Knee arthroscopy (Right, 2021).     Restrictions  Restrictions/Precautions  Restrictions/Precautions: General Precautions, Fall Risk  Vision/Hearing  Vision: Impaired  Vision Exceptions: Wears glasses for reading  Hearing: Within functional limits     Subjective  General  Patient assessed for rehabilitation services?: Yes  Subjective  Subjective: Pt states pain level 5/10 upon arrival to pt's room. Pain Screening  Patient Currently in Pain: Denies          Orientation  Orientation  Overall Orientation Status: Within Functional Limits  Social/Functional History  Social/Functional History  Lives With: Other (comment)(Sister-in-law)  Type of Home: Apartment  Home Layout: One level  Home Access: Level entry  Home Equipment: 4 wheeled walker  ADL Assistance: Independent  Homemaking Assistance: Needs assistance  Cognition   Cognition  Overall Cognitive Status: WFL    Objective          AROM RLE (degrees)  RLE General AROM: RLE grossly limited d/t post-op status  AROM LLE (degrees)  LLE AROM : WFL  Strength RLE  Comment: Grossly 3+/5  Strength LLE  Comment: Grossly 4/5        Bed mobility  Rolling to Right: Stand by assistance  Supine to Sit: Stand by assistance  Scooting: Stand by assistance  Transfers  Sit to Stand: Contact guard assistance  Stand to sit: Contact guard assistance  Ambulation  Ambulation?: Yes  Ambulation 1  Surface: level tile  Device: Rolling Walker  Other Apparatus: O2  Assistance: Contact guard assistance  Gait Deviations: Slow Christine  Distance: 3ft  Stairs/Curb  Stairs?: No     Balance  Sitting - Static: Good  Sitting - Dynamic: Good  Standing - Static: Fair  Standing - Dynamic: [address]  Times per week: 7 days per week  Times per day: Twice a day(Daily on weekends)  Current Treatment Recommendations: Strengthening, IADL Training, Neuromuscular Re-education, Home Exercise Program, ROM, Safety Education & Training, Balance Training, Endurance Training, Patient/Caregiver Education & Training, Functional Mobility Training, Transfer Training, Gait Training, ADL/Self-care Training  Safety Devices  Type of devices:  All fall risk precautions in place                                                  AM-PAC Score     AM-PAC Inpatient Mobility without Stair Climbing

## 2021-03-03 NOTE — CONSULTS
Anthony Lau am scribing for and in the presence of Shivani Puentes. Julia Hare MD, MS, F.A.C.C..    Patient: Radhika Coffey  : 1955  Date of Admission: 3/2/2021  Primary Care Physician: Amrik Johnson  Today's Date: 3/3/2021    REASON FOR CONSULTATION:     HPI:  Ms. Emely Rowley is a 78 y.o. female who has been electively admitted to the hospital for atrial fibrillation. She has been told in the past that she has a heart murmur. Family cardiac history includes her brother who has stents. She has never had any cardiac testing in the past. She is a smoker, starting at age 15 at 1 ppd. Echocardiogram done on 3/2/2021 showed moderate mitral stenosis and moderate aortic stenosis. She sometimes has chest pain, that has pressure quality to it. It happens at least a few times a year. It happens usually when she is doing something, lasting for minutes at a time. She is a heavy breather but doesn't think it is related to shortness of breath. She denies any leg swelling. She denies feeling any palpitations. No syncopal episodes. She denied any current or recent chest pain, abdominal pain, bleeding problems, problems with her medications or any other concerns at this time. A month ago, she could walk a block without any issues. Weakness and back pain is what limits her. Past Medical History:   Diagnosis Date    COPD (chronic obstructive pulmonary disease) ([address]     Hyperlipidemia     Hyperthyroidism     Hypothyroidism     Obesity     Osteoarthritis     Sleep apnea     Tobacco abuse     Type II or unspecified type diabetes mellitus without mention of complication, not stated as uncontrolled        CURRENT ALLERGIES: Patient has no known allergies. REVIEW OF SYSTEMS: 14 systems were reviewed. Pertinent positives and negatives as above, all else negative.      Past Surgical History:   Procedure Laterality Date    HEEL SPUR SURGERY      right heel    KNEE ARTHROSCOPY Right 2021    partial medial meniscetomy    KNEE ARTHROSCOPY Right 3/2/2021    KNEE ARTHROSCOPY-WITH MEDIAL AND LATERAL partial MENISCECTOMY performed by Jose Kapadia MD at [address] [place] [address]      All of back teeth    TUBAL LIGATION      Social History:  Social History     Tobacco Use    Smoking status: Current Every Day Smoker     Packs/day: 1.00     Years: 40.00     Pack years: 40.00    Smokeless tobacco: Never Used    Tobacco comment: smoked on the way to surgery 03/02/21   Substance Use Topics    Alcohol use: No     Alcohol/week: 0.0 standard drinks    Drug use: No        CURRENT MEDICATIONS:  Outpatient Medications Marked as Taking for the 3/2/21 encounter [facility] Encounter)   Medication Sig Dispense Refill    aspirin  MG EC tablet Take 1 tablet by mouth daily      gabapentin (NEURONTIN) 300 MG capsule Take 1 capsule by mouth three times daily for 90 days.  90 capsule 2    tiZANidine (ZANAFLEX) 4 MG tablet Take 1 tablet by mouth every 8 hours as needed (Back pain) 15 tablet 0    acetaminophen (TYLENOL) 325 MG tablet Take 2 tablets by mouth every 8 hours as needed for Pain 30 tablet 0    etodolac (LODINE) 400 MG tablet Take 1 tablet by mouth 2 times daily 60 tablet 0    hydrOXYzine (ATARAX) 25 MG tablet Take 1 tablet by mouth every 8 hours as needed for Anxiety 270 tablet 0    lisinopril (PRINIVIL;ZESTRIL) 2.5 MG tablet TAKE ONE TABLET BY MOUTH DAILY 90 tablet 3    levothyroxine (SYNTHROID) 175 MCG tablet TAKE ONE TABLET BY MOUTH DAILY 90 tablet 3    venlafaxine (EFFEXOR XR) 37.5 MG extended release capsule Take 1 capsule by mouth daily 90 capsule 3    atorvastatin (LIPITOR) 40 MG tablet TAKE ONE TABLET BY MOUTH DAILY 90 tablet 3    metFORMIN (GLUCOPHAGE-XR) 500 MG extended release tablet Take 2 tablets by mouth daily (with breakfast) 180 tablet 3    SITagliptin (JANUVIA) 100 MG tablet Take 1 tablet by mouth daily 90 tablet 3    albuterol sulfate HFA (PROAIR HFA) 108 (90 Base) MCG/ACT inhaler Inhale 2 puffs into the lungs every 4-6 hours as needed for Wheezing 1 Inhaler 0       FAMILY HISTORY: family history includes Cancer in her father; Diabetes in her brother, paternal grandmother, and sister. PHYSICAL EXAM:   BP (!) 113/57   Pulse 66   Temp 97.4 °F (36.3 °C) (Temporal)   Resp 16   Ht 5' 4\" (1.626 m)   Wt 230 lb 6.1 oz (104.5 kg)   SpO2 92%   BMI 39.54 kg/m²  Body mass index is 39.54 kg/m². Constitutional: She is oriented to person, place, and time. She appears well-developed and well-nourished. In no acute distress. HEENT: Normocephalic and atraumatic. No JVD present. Carotid bruit is not present. No mass and no thyromegaly present. No lymphadenopathy present. Cardiovascular: Normal rate, regular rhythm, normal heart sounds. Exam reveals no gallop and no friction rubs. 3/6 systolic murmur, 2nd intercostal space on the RIGHT just lateral to the sternum. Pulmonary/Chest: Effort normal and breath sounds normal. No respiratory distress. She has no wheezes, rhonchi or rales. Abdominal: Soft, non-tender. Bowel sounds and aorta are normal. She exhibits no organomegaly, mass or bruit. Extremities: Trace. No cyanosis or clubbing. 2+ radial and carotid pulses. Distal extremity pulses: 2+ bilaterally. Compression stockings in place. Neurological: She is alert and oriented to person, place, and time. No evidence of gross cranial nerve deficit. Coordination appeared normal.   Skin: Skin is warm and dry. There is no rash or diaphoresis. Psychiatric: She has a normal mood and affect.  Her speech is normal and behavior is normal.      MOST RECENT LABS ON RECORD:   Lab Results   Component Value Date    WBC 11.6 (H) 03/03/2021    HGB 11.8 (L) 03/03/2021    HCT 37.7 03/03/2021     03/03/2021    CHOL 239 (H) 12/29/2020    TRIG 219 (H) 12/29/2020    HDL 36 (L) 12/29/2020    ALT 11 03/02/2021    AST 12 03/02/2021     (L) 03/03/2021    K 3.5 (L) 03/03/2021    CL 95 (L) 03/03/2021 CREATININE 0.57 03/03/2021    BUN 8 03/03/2021    CO2 30 03/03/2021    TSH 0.87 12/29/2020    INR 1.1 03/02/2021    LABA1C 7.0 (H) 02/19/2021    LABMICR CANNOT BE CALCULATED 12/29/2020         ASSESSMENT:  Patient Active Problem List    Diagnosis Date Noted    S/P arthroscopic partial medial meniscectomy -- right knee 03/03/2021     Priority: Low    Hypoxia 03/02/2021     Priority: Low    Atrial fibrillation with RVR ([facility] 03/02/2021     Priority: Low    Aspiration pneumonia ([facility] 03/02/2021     Priority: Low    Morbidly obese ([facility] 06/25/2020     Priority: Low    Positive FIT (fecal immunochemical test) 11/08/2019     Priority: Low    Tobacco abuse 03/16/2016     Priority: Low    Noncompliance with therapeutic plan 06/26/2013     Priority: Low    Obesity (BMI 30-39.9) 11/06/2012     Priority: Low    Essential hypertension 11/06/2012     Priority: Low    Diabetes type 2, controlled ([facility] 07/23/2012     Priority: Low    Dyslipidemia 07/23/2012     Priority: Low    Hypothyroid 07/23/2012     Priority: Low    Dermatophytosis of foot 11/02/2011     Priority: Low       PLAN:    · Paroxysmal Atrial Fibrillation: with RVR - Very symptomatic but Back in normal rhythm now   Beta Blocker: Continue Metoprolol tartrate (Lopressor) 25 mg 1/2 tab bid.  Calcium Channel Blocker: Not indicated at this time.  Anti-Arrhythmic: Not indicated. [id number] Score for Atrial Fibrillation Stroke Risk   Risk   Factors  Component Value   C CHF No 0   H HTN Yes 1   A2 Age >= 76 No,  (77 y.o.) 0   D DM Yes 1   S2 Prior Stroke/TIA No 0   V Vascular Disease No 0   A Age 74-69 Yes,  (77 y.o.) 1   Sc Sex female 1    [id number]  Score  4   Score last updated 3/3/21 5:12 PM EST    Click here for a link to the UpToDate guideline \"Atrial Fibrillation: Anticoagulation therapy to prevent embolization    Disclaimer: Risk Score calculation is dependent on accuracy of patient problem list and past encounter diagnosis.     Stroke Risk: CHADS2-VASc Score: 4/9 (4% stroke risk)   Anticoagulation: Not indicated at this time.  Additional Testing List: I ordered an EVENT MONITOR to try and pinpoint the etiology of their symptoms (CAM - 2 WEEKS)     · Moderate Aortic and Mitral Stenosis: symptomatic at least when in atrial fibrillation with RVR, spontaneously resolved  · Beta Blocker: Continue Metoprolol tartrate (Lopressor) 25 mg 1/2 tab bid. · ACE Inibitor/ARB: Continue lisinopril 5 mg, 1/2 tab daily. · Diuretics: Not indicated at this time. · I told them to continue wearing lower extremity compression stockings and I advised them to try and keep their legs up whenever possible and to limit salt in their diet. · Follow with Echocardiograms to monitor  · Outpatient stress test in a couple weeks - will discuss results at her follow up in my office in 2 weeks. Tobacco Abuse Counseling: I spent several minutes discussing the dangers of tobacco abuse as well as multiple methods for trying to quit smoking. In the end, Ms. Isaias Valenzuela said that she did not want any assistance at this time but would continue to try and quit reduce and eventually quit smoking in the near future. She has quit for a whole year in the past so she knows she can do it. Once again, thank you for allowing me to participate in this patients care. Please do not hesitate to contact me if I could be of any further assistance. Sincerely,  Prieto Finch MD, MS, F.A.C.C. [facility] Cardiology Specialist    [address] [place] [address] [address] [facility]  Phone: [phone], Fax: [phone]     I believe that the risk of significant morbidity and mortality related to the patient's current medical conditions are: Intermediate. The documentation recorded by the scribe, accurately and completely reflects the services I personally performed and the decisions made by me. Chris Baker MD, MS, F.A.C.C.  March 3, 2021

## 2021-03-03 NOTE — PROGRESS NOTES
Physical Therapy  Facility/Department: [facility] MED SURG  Daily Treatment Note  NAME: Mark Gamez  : 1955  MRN: [id number]  Date of Service: 3/3/2021    Discharge Recommendations:  Continue to assess pending progress, Patient would benefit from continued therapy after discharge      Assessment   Assessment: Ex completed EOB and supine today. Pt denied amb d/t kne epain and \"npt wearing underwear currently\". Will cont. Treatment Diagnosis: generalized weakness  Patient Education: Benefits of ex/ amb. REQUIRES PT FOLLOW UP: Yes  Activity Tolerance  Activity Tolerance: Patient Tolerated treatment well     Patient Diagnosis(es): There were no encounter diagnoses. has a past medical history of COPD (chronic obstructive pulmonary disease) ([facility] Hyperlipidemia, Hyperthyroidism, Hypothyroidism, Obesity, Osteoarthritis, Sleep apnea, Tobacco abuse, and Type II or unspecified type diabetes mellitus without mention of complication, not stated as uncontrolled. has a past surgical history that includes Heel spur surgery; Tonsillectomy; Tubal ligation; Tooth Extraction; Knee arthroscopy (Right, 2021); and Knee arthroscopy (Right, 3/2/2021). Restrictions  Restrictions/Precautions  Restrictions/Precautions: General Precautions, Fall Risk  Subjective   General  Chart Reviewed: Yes  Subjective  Subjective: Pt reports her knee pain is a 7/10. Pt states her pain medicine is wearing off. Orientation  Orientation  Overall Orientation Status: Within Functional Limits  Cognition      Objective   Bed mobility  Rolling to Right: Stand by assistance  Supine to Sit: Stand by assistance     Exercises  Straight Leg Raise: 10x  Hamstring Sets: 10x  Quad Sets: 10x  Gluteal Sets: 10x  Hip Flexion: 15x  Hip Abduction: 15x  Knee Long Arc Quad: 15x  Ankle Pumps: 15x  Comments: ex completed ion supine and seated EOB today.       Goals  Short term goals  Time Frame for Short term goals: 10 days  Short term goal 1: Pt will be independent with bed mob/transfers to improve functional independence. Short term goal 2: Pt will ambulate >/= 150ft with RW at level of SUP to improve functional ambulation. Short term goal 3: Pt will tolerate 20-30mins ther ex/act to improve endurance for ADLs and functional tasks. Plan    Plan  Times per week: 7 days per week  Times per day: Twice a day(Daily on weekends)  Current Treatment Recommendations: Strengthening, IADL Training, Neuromuscular Re-education, Home Exercise Program, ROM, Safety Education & Training, Balance Training, Endurance Training, Patient/Caregiver Education & Training, Functional Mobility Training, Transfer Training, Gait Training, ADL/Self-care Training  Safety Devices  Type of devices:  All fall risk precautions in place     Therapy Time   Individual Concurrent Group Co-treatment   Time In 1401         Time Out 1425         Minutes 24         Timed Code Treatment Minutes: 25 Minutes     [place], [place]

## 2021-03-03 NOTE — PROGRESS NOTES
Met with Patient this a.m. to discuss discharge planning. Patient is a 78year old , white female, admitted with a diagnosis of Aspiration Pneumonia. Patient is alert and oriented, pleasant and cooperative with this assessment. States that she wishes to return to her home when deemed medically stable. Patient resides in [place] with her sister in law. She has 4 adult children, 3 of whom live near by and are supportive and involved. Patient uses a walker for assistance at home. She states that she has an elevated toilet seat ordered and is working with her insurance to have this item covered as well. Patient uses no outside services. She is independent with her ADL's. She drives herself and provides for her own transportation needs. 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 PCP is Parish Johnson CNP. Patient states that she has insurance coverage to assist with the cost of her medication expense and needs no further assistance with this at this time. Discharge plan is home with no services when stable. Patient is a 'Full Code' Status and does not have medical directives in place currently. Patient states that she is not interested in pursuing documents at this time. No anticipated needs or concerns identified by Patient. LSW to monitor for discharge planning needs and assist as needs/concerns present.     Henry. Venkatesh R[address]  3/3/2021

## 2021-03-03 NOTE — PROGRESS NOTES
Notified Mert Scott this morning of patients bradycardia on EKG and telemetry. Orders to hold lopressor for this morning. Cardiology to see patient sometime today.

## 2021-03-03 NOTE — PROGRESS NOTES
Progress Note    SUBJECTIVE: Follow-up on hypoxia    OBJECTIVE: Sitting up in bed alert and oriented in no acute distress. Currently she is on 1 L of oxygen which is improved from 5 L yesterday. She denies any chest pain or shortness of breath at this time. She is tolerating her diet well. She is afebrile. Pain is controlled at this time to her right knee. Vitals:   Vitals:    03/03/21 0659   BP: (!) 101/56   Pulse: 56   Resp: 16   Temp:    SpO2: 92%     Weight: 230 lb 6.1 oz (104.5 kg)   Height: 5' 4\" (162.6 cm)   -----------------------------------------------------------------  Exam:    CONSTITUTIONAL:  awake, alert, cooperative, no apparent distress, and appears stated age  EYES:  Lids and lashes normal, pupils equal, round and reactive to light, extra ocular muscles intact, sclera clear, conjunctiva normal  ENT:  Normocephalic, without obvious abnormality, atraumatic, sinuses nontender on palpation, external ears without lesions, oral pharynx with moist mucus membranes, tonsils without erythema or exudates, gums normal and good dentition.   NECK:  Supple, symmetrical, trachea midline, no adenopathy, thyroid symmetric, not enlarged and no tenderness, skin normal  HEMATOLOGIC/LYMPHATICS:  no cervical lymphadenopathy and no supraclavicular lymphadenopathy  LUNGS:  no increased work of breathing, good air exchange and crackles right base and left base  CARDIOVASCULAR:  bradycardic with regular rhythm, normal S1 and S2, no edema and systolic murmur  ABDOMEN:  No scars, normal bowel sounds, soft, non-distended, non-tender, no masses palpated, no hepatosplenomegally  MUSCULOSKELETAL: Decreased range of motion to right knee due to surgery  there is no redness, warmth, or swelling of the joints  NEUROLOGIC:  Mental Status Exam:  Level of Alertness:   awake  Orientation:   person, place, time  Memory:   normal  SKIN:  no bruising or bleeding, normal skin color, texture, turgor and no redness, warmth, or swelling  EXT:   Right knee edema dressing clean dry and intact  -----------------------------------------------------------------  Diagnostic Data: Reviewed    ASSESSMENT:   Principal Problem:    Aspiration pneumonia ([facility]  Active Problems:    Diabetes type 2, controlled ([facility]    Obesity (BMI 30-39. 9)    Essential hypertension    Tobacco abuse    Hypoxia    Atrial fibrillation with RVR ([facility])    S/P arthroscopic partial medial meniscectomy -- right knee  Resolved Problems:    * No resolved hospital problems. *        PLAN:  · Aspiration pneumonia  · Continue Unasyn  · Acapella  · Incentive spirometer  · Type 2 diabetes  · POCT before meals and at bedtime  · Hypoglycemia protocol  · Continue Glucophage  · Obesity  · Appreciate dietitian  · Obesity increases risk of mortality  · Essential hypertension  · Continue lisinopril  · Tobacco abuse  · Nicotine patch  · Smoking cessation  · Atrial fibrillation with RVR  · EKG  · Echocardiogram-normal EF.   Consider DA to evaluate valves  · Appreciate cardiology  · Lopressor 12.5 mg twice daily  · Lovenox  · Status post arthroscopy partial medial meniscectomy of the right knee  · PT/OT  · Appreciate Dr. Joselin Cordoba  · Hypokalemia  · BMP daily  · Klor-Con 40 mEq x 1 today  · High risk medications: None  · Discharge plan: Pending      CYRUS Bridges, NP-C

## 2021-03-03 NOTE — PLAN OF CARE
Problem: Falls - Risk of:  Goal: Will remain free from falls  Description: Will remain free from falls  3/2/2021 2232 by Mihaela Lara RN  Note: Fall risk assessment done and patient is a high risk for falls. Alarms on as needed for patient safety. Patient being monitored on a regular basis. No falls noted at this time. Problem: Falls - Risk of:  Goal: Will remain free from falls  Description: Will remain free from falls  3/2/2021 2231 by Mihaela Lara RN  Outcome: Ongoing  Note: Fall risk assessment done and patient is a high risk for falls. Alarms on as needed for patient safety. Patient being monitored on a regular basis. No falls noted at this time. Problem: Pain:  Goal: Pain level will decrease  Description: Pain level will decrease  3/2/2021 2232 by Mihaela Lara RN  Outcome: Ongoing  Note: Pain assessed routinely and as needed with a 0-10 scale. PRN pain medication given as appropriate per orders. Pain reassessed within an hour, will continue to monitor       Problem: Pain:  Goal: Pain level will decrease  Description: Pain level will decrease  3/2/2021 2231 by Mihaela Lara RN  Outcome: Ongoing  Note: Pain assessed routinely and as needed with a 0-10 scale. PRN pain medication given as appropriate per orders. Pain reassessed within an hour, will continue to monitor       Problem: Airway Clearance - Ineffective:  Goal: Clear lung sounds  Description: Clear lung sounds  Outcome: Ongoing  Note: Patient has diminished lung sounds     Problem: Gas Exchange - Impaired:  Goal: Levels of oxygenation will improve  Description: Levels of oxygenation will improve  Outcome: Ongoing  Note: Patient remains on O2 but SpO2 is in the mid 90s.

## 2021-03-03 NOTE — PROGRESS NOTES
Occupational Therapy   Occupational Therapy Initial Assessment  Date: 3/3/2021   Patient Name: Néstor Milligan  MRN: [id number]     : 1955    Date of Service: 3/3/2021    Discharge Recommendations:  Continue to assess pending progress       Assessment   Performance deficits / Impairments: Decreased functional mobility ; Decreased ADL status; Decreased strength;Decreased endurance;Decreased balance  Assessment: Pt is a 79 y/o female admitted s/p R medial and lateral meniscectomy. Pt presents with decreased strength, endurance ,and dynamic standing balance resulting in decreased independence with ADLs. Pt to benefit from OT services to address these deficits and return to PLOF. Treatment Diagnosis: generalized weakness  Prognosis: Good  Decision Making: Medium Complexity  OT Education: OT Role;Plan of Care;Transfer Training;ADL Adaptive Strategies  Patient Education: Pt educated on transfer techniques and discharge folder including fall prevention and AE recommendations of toilet riser. Education on LB dressing techniques using sock and easy slide. Demonstration of sock aid and easy slide with pt verbalizing and demonstrating understanding  Barriers to Learning: none  REQUIRES OT FOLLOW UP: Yes  Activity Tolerance  Activity Tolerance: Patient Tolerated treatment well  Safety Devices  Safety Devices in place: Yes  Type of devices: Left in chair;Call light within reach;Nurse notified           Patient Diagnosis(es): There were no encounter diagnoses. has a past medical history of COPD (chronic obstructive pulmonary disease) ([facility] [address] Hyperlipidemia, Hyperthyroidism, Hypothyroidism, Obesity, Osteoarthritis, Sleep apnea, Tobacco abuse, and Type II or unspecified type diabetes mellitus without mention of complication, not stated as uncontrolled. has a past surgical history that includes Heel spur surgery; Tonsillectomy; Tubal ligation; Tooth Extraction; and Knee arthroscopy (Right, 2021).     Treatment Diagnosis: generalized weakness      Restrictions  Restrictions/Precautions  Restrictions/Precautions: General Precautions, Fall Risk    Subjective   General  Chart Reviewed: Yes  Patient assessed for rehabilitation services?: Yes  Family / Caregiver Present: No  Referring Practitioner: Ashli TAM  Diagnosis: hypoxia  Subjective  Subjective: Pt reports 5/10 R knee pain this date. General Comment  Comments: Pt in bedside chair upon OT arrival. Agreeable to OT evaluation. Social/Functional History  Social/Functional History  Lives With: Other (comment)(sister in law)  Type of Home: Apartment  Home Layout: One level  Home Access: Level entry  Bathroom Shower/Tub: Tub/Shower unit  Bathroom Toilet: Standard  Bathroom Equipment: Grab bars in [address]: 4 wheeled walker  ADL Assistance: Needs assistance  [address]: Needs assistance  Ambulation Assistance: Independent  Transfer Assistance: Independent  Active : Yes  Additional Comments: Pt reports she lives with her sister in law. She requires assistance to don/doff socks and shoes and perform cooking, cleaning, and laundry.        Objective   Vision: Impaired  Vision Exceptions: Wears glasses for reading  Hearing: Within functional limits    Orientation  Overall Orientation Status: Within Functional Limits     Balance  Sitting Balance: Supervision  Standing Balance: Contact guard assistance  Standing Balance  Time: 5 minutes  Activity: functional mobility, ADLs  Comment: F+ dynamic standing balance, no LOB or safety concerns  Functional Mobility  Functional - Mobility Device: 4-Wheeled Walker  Activity: To/from bathroom  Assist Level: Contact guard assistance  Toilet Transfers  Toilet - Technique: Ambulating  Equipment Used: Standard toilet  Toilet Transfer: Minimal assistance  Toilet Transfers Comments: min A for sit>stand  ADL  Feeding: Setup  Grooming: Setup  UE Bathing: Stand by assistance  LE Bathing: Contact guard assistance  UE Dressing: Stand by assistance  LE Dressing: Contact guard assistance  Toileting: Contact guard assistance  Additional Comments: Pt performed toileting with CGA for dynamic standing balance when perfroming pericare. Grooming tasks standing sinkside with CGA           Transfers  Sit to stand: Minimal assistance  Stand to sit: Contact guard assistance  Transfer Comments: verbal cues for transfer techniques and sliding R LE forward when lowering to sit     Cognition  Overall Cognitive Status: WFL                    LUE Strength  L Hand General: 4/5  RUE Strength  R Hand General: 4/5                   Plan   Plan  Times per week: 7  Times per day: Daily  Current Treatment Recommendations: Strengthening, Endurance Training, Patient/Caregiver Education & Training, Self-Care / ADL, Balance Training, Functional Mobility Training, Safety Education & Training  Plan Comment: ther act, ther ex, ADL training    G-Code     OutComes Score                                                  AM-PAC Score        AM-[facility] Inpatient Daily Activity Raw Score: 19 (03/03/21 0921)  AM-PAC Inpatient ADL T-Scale Score : 40.22 (03/03/21 0921)  ADL Inpatient CMS 0-100% Score: 42.8 (03/03/21 0921)  ADL Inpatient CMS G-Code Modifier : CK (03/03/21 0921)    Goals  Short term goals  Time Frame for Short term goals: 20 visits  Short term goal 1: Pt will perform functional transfers/functional mobility with mod I using LRAD to increase independence with ADLs. Short term goal 2: Pt yeni tolerate 15 minutes or greater ther act/ther ex without intolerable SOB to increase functional activity tolerance  Short term goal 3: Pt will perform LB bathing/dressing with mod I using AE as needed to increase independence with ADLs. Short term goal 4: Pt will verbalize and demonstrate understanding of discharge education and recommendations.        Therapy Time   Individual Concurrent Group Co-treatment   Time In 7062         Time Out 0906         Minutes 24 Patito Kay, OT

## 2021-03-03 NOTE — PROGRESS NOTES
Comprehensive Nutrition Assessment    Type and Reason for Visit:  Initial, Positive Nutrition Screen    Nutrition Recommendations/Plan:  Encourage oral intakes    Nutrition Assessment:  Altered nutrition related labs r/t endocrine dysfunction, AEB glucose elevations beyond usual control (per A1C). Reporting no decreases in appetite with SOB, which is improving. States used to be 250#, but this appears to have been remote. Denies education needs for her relatively well controlled diabetes. Malnutrition Assessment:  Malnutrition Status:  No malnutrition    Context:  Acute Illness     Findings of the 6 clinical characteristics of malnutrition:  Energy Intake:  No significant decrease in energy intake  Weight Loss:  No significant weight loss     Body Fat Loss:  No significant body fat loss     Muscle Mass Loss:  No significant muscle mass loss    Fluid Accumulation:  1 - Mild Extremities   Strength:  Not Performed    Estimated Daily Nutrient Needs:  Energy (kcal):  8751-0832(46-47); Weight Used for Energy Requirements:  Current     Protein (g):  71-82(1.3-1.5);  Weight Used for Protein Requirements:  Current        Fluid (ml/day):  1900; Method Used for Fluid Requirements:  1 ml/kcal      Nutrition Related Findings:  fair dentition      Wounds:  None       Current Nutrition Therapies:    DIET GENERAL; Carb Control: 4 carb choices (60 gms)/meal; Low Sodium (2 GM)    Anthropometric Measures:  · Height: 5' 4\" (162.6 cm)  · Current Body Weight: 230 lb 6.1 oz (104.5 kg)   · Admission Body Weight: 220 lb (99.8 kg)    · Usual Body Weight: 225 lb 8 oz (102.3 kg)(< 1 month ago)     · Ideal Body Weight: 120 lbs; % Ideal Body Weight 192 %   · BMI: 39.5  · Adjusted Body Weight:  ; No Adjustment   · BMI Categories: Obese Class 2 (BMI 35.0 -39.9)       Nutrition Diagnosis:   · Altered nutrition-related lab values related to endocrine dysfuntion as evidenced by lab values    Lab Results   Component Value Date     (L) 03/03/2021    K 3.5 (L) 03/03/2021    CL 95 (L) 03/03/2021    CO2 30 03/03/2021    BUN 8 03/03/2021    CREATININE 0.57 03/03/2021    GLUCOSE 163 (H) 03/03/2021    CALCIUM 8.8 03/03/2021    PROT 7.0 03/02/2021    LABALBU 3.1 (L) 03/02/2021    BILITOT 0.27 (L) 03/02/2021    ALKPHOS 125 (H) 03/02/2021    AST 12 03/02/2021    ALT 11 03/02/2021    LABGLOM >60 03/03/2021    GFRAA >60 03/03/2021     Lab Results   Component Value Date    LABA1C 7.0 (H) 02/19/2021     Lab Results   Component Value Date     02/19/2021   No results found for: VITD25    Nutrition Interventions:   Food and/or Nutrient Delivery:  Continue Current Diet  Nutrition Education/Counseling:  No recommendation at this time   Coordination of Nutrition Care:  Continue to monitor while inpatient    Goals:  PO >75% meals with consistent carb intakes       Nutrition Monitoring and Evaluation:   Behavioral-Environmental Outcomes:  None Identified   Food/Nutrient Intake Outcomes:  Food and Nutrient Intake  Physical Signs/Symptoms Outcomes:  Biochemical Data, Weight, Fluid Status or Edema     Discharge Planning:    Continue current diet     Electronically signed by Jennifer Sanchez RD, BEBO on 3/3/21 at 12:00 PM EST    Contact: [phone]

## 2021-03-04 ENCOUNTER — HOSPITAL ENCOUNTER (OUTPATIENT)
Dept: NON INVASIVE DIAGNOSTICS | Age: 66
Discharge: HOME | DRG: 987 | End: 2021-03-04
Attending: ORTHOPAEDIC SURGERY
Payer: MEDICARE

## 2021-03-04 VITALS
HEART RATE: 75 BPM | HEIGHT: 64 IN | TEMPERATURE: 97.5 F | BODY MASS INDEX: 39.37 KG/M2 | RESPIRATION RATE: 18 BRPM | SYSTOLIC BLOOD PRESSURE: 152 MMHG | WEIGHT: 230.6 LBS | OXYGEN SATURATION: 93 % | DIASTOLIC BLOOD PRESSURE: 75 MMHG

## 2021-03-04 LAB
ABSOLUTE EOS #: 0.21 K/UL (ref 0–0.44)
ABSOLUTE IMMATURE GRANULOCYTE: <0.03 K/UL (ref 0–0.3)
ABSOLUTE LYMPH #: 1.91 K/UL (ref 1.1–3.7)
ABSOLUTE MONO #: 0.64 K/UL (ref 0.1–1.2)
ANION GAP SERPL CALCULATED.3IONS-SCNC: 8 MMOL/L (ref 9–17)
BASOPHILS # BLD: 1 % (ref 0–2)
BASOPHILS ABSOLUTE: 0.07 K/UL (ref 0–0.2)
BUN BLDV-MCNC: 8 MG/DL (ref 8–23)
BUN/CREAT BLD: 16 (ref 9–20)
CALCIUM SERPL-MCNC: 8.8 MG/DL (ref 8.6–10.4)
CHLORIDE BLD-SCNC: 103 MMOL/L (ref 98–107)
CO2: 29 MMOL/L (ref 20–31)
CREAT SERPL-MCNC: 0.5 MG/DL (ref 0.5–0.9)
DIFFERENTIAL TYPE: ABNORMAL
EOSINOPHILS RELATIVE PERCENT: 3 % (ref 1–4)
GFR AFRICAN AMERICAN: >60 ML/MIN
GFR NON-AFRICAN AMERICAN: >60 ML/MIN
GFR SERPL CREATININE-BSD FRML MDRD: ABNORMAL ML/MIN/{1.73_M2}
GFR SERPL CREATININE-BSD FRML MDRD: ABNORMAL ML/MIN/{1.73_M2}
GLUCOSE BLD-MCNC: 125 MG/DL (ref 74–100)
GLUCOSE BLD-MCNC: 130 MG/DL (ref 70–99)
GLUCOSE BLD-MCNC: 132 MG/DL (ref 74–100)
HCT VFR BLD CALC: 38 % (ref 36.3–47.1)
HEMOGLOBIN: 12.3 G/DL (ref 11.9–15.1)
IMMATURE GRANULOCYTES: 0 %
LYMPHOCYTES # BLD: 23 % (ref 24–43)
MCH RBC QN AUTO: 27.2 PG (ref 25.2–33.5)
MCHC RBC AUTO-ENTMCNC: 32.4 G/DL (ref 28.4–34.8)
MCV RBC AUTO: 84.1 FL (ref 82.6–102.9)
MONOCYTES # BLD: 8 % (ref 3–12)
NRBC AUTOMATED: 0 PER 100 WBC
PDW BLD-RTO: 13.1 % (ref 11.8–14.4)
PLATELET # BLD: 306 K/UL (ref 138–453)
PLATELET ESTIMATE: ABNORMAL
PMV BLD AUTO: 10.3 FL (ref 8.1–13.5)
POTASSIUM SERPL-SCNC: 3.6 MMOL/L (ref 3.7–5.3)
RBC # BLD: 4.52 M/UL (ref 3.95–5.11)
RBC # BLD: ABNORMAL 10*6/UL
SEG NEUTROPHILS: 65 % (ref 36–65)
SEGMENTED NEUTROPHILS ABSOLUTE COUNT: 5.44 K/UL (ref 1.5–8.1)
SODIUM BLD-SCNC: 140 MMOL/L (ref 135–144)
WBC # BLD: 8.3 K/UL (ref 3.5–11.3)
WBC # BLD: ABNORMAL 10*3/UL

## 2021-03-04 PROCEDURE — 97535 SELF CARE MNGMENT TRAINING: CPT

## 2021-03-04 PROCEDURE — 94640 AIRWAY INHALATION TREATMENT: CPT

## 2021-03-04 PROCEDURE — 2580000003 HC RX 258: Performed by: FAMILY MEDICINE

## 2021-03-04 PROCEDURE — 80048 BASIC METABOLIC PNL TOTAL CA: CPT

## 2021-03-04 PROCEDURE — 82947 ASSAY GLUCOSE BLOOD QUANT: CPT

## 2021-03-04 PROCEDURE — 6370000000 HC RX 637 (ALT 250 FOR IP): Performed by: NURSE PRACTITIONER

## 2021-03-04 PROCEDURE — 6360000002 HC RX W HCPCS: Performed by: ORTHOPAEDIC SURGERY

## 2021-03-04 PROCEDURE — 6360000002 HC RX W HCPCS: Performed by: FAMILY MEDICINE

## 2021-03-04 PROCEDURE — 36415 COLL VENOUS BLD VENIPUNCTURE: CPT

## 2021-03-04 PROCEDURE — 94761 N-INVAS EAR/PLS OXIMETRY MLT: CPT

## 2021-03-04 PROCEDURE — 93247 EXT ECG>7D<15D SCAN A/R: CPT

## 2021-03-04 PROCEDURE — 97110 THERAPEUTIC EXERCISES: CPT

## 2021-03-04 PROCEDURE — 93246 EXT ECG>7D<15D RECORDING: CPT

## 2021-03-04 PROCEDURE — 97116 GAIT TRAINING THERAPY: CPT

## 2021-03-04 PROCEDURE — 2580000003 HC RX 258: Performed by: NURSE PRACTITIONER

## 2021-03-04 PROCEDURE — 85025 COMPLETE CBC W/AUTO DIFF WBC: CPT

## 2021-03-04 RX ORDER — AMOXICILLIN AND CLAVULANATE POTASSIUM 875; 125 MG/1; MG/1
1 TABLET, FILM COATED ORAL 2 TIMES DAILY
Qty: 14 TABLET | Refills: 0 | Status: SHIPPED | OUTPATIENT
Start: 2021-03-04 | End: 2021-03-11

## 2021-03-04 RX ADMIN — VENLAFAXINE HYDROCHLORIDE 37.5 MG: 37.5 CAPSULE, EXTENDED RELEASE ORAL at 08:00

## 2021-03-04 RX ADMIN — METFORMIN HYDROCHLORIDE 1000 MG: 500 TABLET, EXTENDED RELEASE ORAL at 08:00

## 2021-03-04 RX ADMIN — ACETAMINOPHEN 650 MG: 325 TABLET ORAL at 03:44

## 2021-03-04 RX ADMIN — ENOXAPARIN SODIUM 40 MG: 40 INJECTION SUBCUTANEOUS at 08:25

## 2021-03-04 RX ADMIN — ATORVASTATIN CALCIUM 40 MG: 40 TABLET, FILM COATED ORAL at 08:00

## 2021-03-04 RX ADMIN — FAMOTIDINE 20 MG: 20 TABLET ORAL at 08:00

## 2021-03-04 RX ADMIN — LISINOPRIL 2.5 MG: 2.5 TABLET ORAL at 08:00

## 2021-03-04 RX ADMIN — SODIUM CHLORIDE 1500 MG: 9 INJECTION, SOLUTION INTRAVENOUS at 08:24

## 2021-03-04 RX ADMIN — GABAPENTIN 300 MG: 300 CAPSULE ORAL at 13:47

## 2021-03-04 RX ADMIN — ALOGLIPTIN 25 MG: 25 TABLET, FILM COATED ORAL at 08:00

## 2021-03-04 RX ADMIN — ALBUTEROL SULFATE 2.5 MG: 2.5 SOLUTION RESPIRATORY (INHALATION) at 09:36

## 2021-03-04 RX ADMIN — LEVOTHYROXINE SODIUM 175 MCG: 150 TABLET ORAL at 08:00

## 2021-03-04 RX ADMIN — METOPROLOL TARTRATE 12.5 MG: 25 TABLET, FILM COATED ORAL at 08:05

## 2021-03-04 RX ADMIN — GABAPENTIN 300 MG: 300 CAPSULE ORAL at 08:00

## 2021-03-04 RX ADMIN — SODIUM CHLORIDE, PRESERVATIVE FREE 10 ML: 5 INJECTION INTRAVENOUS at 08:30

## 2021-03-04 RX ADMIN — SODIUM CHLORIDE 1500 MG: 9 INJECTION, SOLUTION INTRAVENOUS at 13:47

## 2021-03-04 RX ADMIN — SODIUM CHLORIDE 1500 MG: 9 INJECTION, SOLUTION INTRAVENOUS at 00:57

## 2021-03-04 ASSESSMENT — PAIN SCALES - GENERAL: PAINLEVEL_OUTOF10: 8

## 2021-03-04 NOTE — DISCHARGE SUMMARY
Discharge Summary    Ken Mccarthy  :  1955  MRN:  [id number]    Admit date:  3/2/2021      Discharge date: 3/4/2021     Admitting Physician:  Lissette Rubio MD    Discharge Diagnoses:    Principal Problem:    Aspiration pneumonia [facility])  Active Problems: Moderate mitral stenosis by prior echocardiogram    Moderate aortic stenosis by prior echocardiogram    Diabetes type 2, controlled ([facility])    Obesity (BMI 30-39. 9)    Essential hypertension    Tobacco abuse    Hypoxia    Atrial fibrillation with RVR ([facility])    S/P arthroscopic partial medial meniscectomy -- right knee  Resolved Problems:    * No resolved hospital problems. *      Hospital Course:   Ken Mccarthy is a 78 y.o. female admitted with aspiration pneumonia. She underwent a arthroscopy for partial medial meniscectomy of the right knee. Intraoperatively she flipped in and out of atrial fibrillation with RVR and on conversion went back to a normal sinus rhythm. Postoperatively she became hypoxic and was requiring 5 L of oxygen. She was negative for pulmonary embolism. However CT did show some aspiration. Cardiology was consulted to evaluate the atrial fibrillation. She will have a CAM monitor placed today on discharge to be worn for 2 weeks. She will follow-up with cardiology as an outpatient for planned stress test.  At this time we will not start anticoagulation and will let cardiology evaluate the need after evaluation of the CAM recording and stress test.  She is currently off all oxygen and doing well. She is tolerating activity. She is ambulating with a walker and her pain is controlled.     Consultants:  Dr. Zev Jovel, cardiology    Procedures: none    Complications: Hypoxia     Discharge Condition: fair    Exam:  CONSTITUTIONAL:  awake, alert, cooperative, no apparent distress, and appears stated age  EYES:  Lids and lashes normal, pupils equal, round and reactive to light, extra ocular muscles intact, sclera clear, conjunctiva normal  ENT:  Normocephalic, without obvious abnormality, atraumatic, sinuses nontender on palpation, external ears without lesions, oral pharynx with moist mucus membranes, tonsils without erythema or exudates, gums normal and good dentition.   NECK:  Supple, symmetrical, trachea midline, no adenopathy, thyroid symmetric, not enlarged and no tenderness, skin normal  HEMATOLOGIC/LYMPHATICS:  no cervical lymphadenopathy and no supraclavicular lymphadenopathy  LUNGS:  no increased work of breathing, good air exchange and crackles right base and left base  CARDIOVASCULAR:  bradycardic with regular rhythm, normal S1 and S2, no edema and systolic murmur  ABDOMEN:  No scars, normal bowel sounds, soft, non-distended, non-tender, no masses palpated, no hepatosplenomegally  MUSCULOSKELETAL: Decreased range of motion to right knee due to surgery  there is no redness, warmth, or swelling of the joints  NEUROLOGIC:  Mental Status Exam:  Level of Alertness:   awake  Orientation:   person, place, time  Memory:   normal  SKIN:  no bruising or bleeding, normal skin color, texture, turgor and no redness, warmth, or swelling  EXT:    Right knee edema dressing clean dry and intact    Significant Diagnostic Studies:   Lab Results   Component Value Date    WBC 8.3 03/04/2021    HGB 12.3 03/04/2021     03/04/2021       Lab Results   Component Value Date    BUN 8 03/04/2021    CREATININE 0.50 03/04/2021     03/04/2021    K 3.6 (L) 03/04/2021    CALCIUM 8.8 03/04/2021     03/04/2021    CO2 29 03/04/2021    LABGLOM >60 03/04/2021       Lab Results   Component Value Date    WBCUA None 06/07/2016    RBCUA None 06/07/2016    EPITHUA 5 TO 10 06/07/2016    LEUKOCYTESUR NEGATIVE 06/07/2016    SPECGRAV <1.005 (L) 06/07/2016    GLUCOSEU NEGATIVE 06/07/2016    KETUA NEGATIVE 06/07/2016    PROTEINU NEGATIVE 06/07/2016    HGBUR NEGATIVE 06/07/2016    CASTUA NOT REPORTED 06/07/2016    CRYSTUA NOT REPORTED 06/07/2016    BACTERIA NOT REPORTED 06/07/2016    YEAST NOT REPORTED 06/07/2016       Echocardiogram Complete 2d With Doppler With Color    Result Date: 3/2/2021  [facility] Transthoracic Echocardiography Report (TTE)  Patient Name Neyda Lau Date of Study               03/02/2021               A   Date of      1955  Gender                      Female  Birth   Age          78 year(s)  Race                           Room Number  1438        Height:                     64 inch, 162.56 cm   Corporate ID [id number]    Weight:                     220 pounds, 99.8 kg  #   Patient Acct [de-identified]   BSA:          2.04 m^2      BMI:      37.76  #                                                              kg/m^2   MR #         [id number]      [facility]   Accession #  [id number]  Interpreting Physician      Marah Amato   Fellow                   Referring Nurse             Elisa Lai, UM                           Practitioner   Interpreting             Referring Physician  Fellow  Type of Study   TTE procedure:2D Echocardiogram, M-Mode, Doppler, Color Doppler. Procedure Date Date: 03/02/2021 Start: 04:46 PM [facility] Indications:Atrial fibrillation. History / Tech. Comments: New afib PMHX; DM,COPD, Knee surgery Patient Status: Inpatient Height: 64 inches Weight: 220 pounds BSA: 2.04 m^2 BMI: 37.76 kg/m^2 BP: 130/68 mmHg Allergies   - *No Known Allergies. CONCLUSIONS Summary Small left ventricular cavity with moderate left ventricular wall thickness and choral systolic anterior motion and no significant left ventricular outflow tract gradient at rest or with valsalva maneuver. Normal ejection fraction and wall motion. poorly visualized aortic valve but Doppler studies suggest moderate aortic stenosis with peak and mean gradient across the aortic valve 34 and 20 mmHg respectively. Posterior mitral annular and leaflet calcification with moderate mitral stenosis.  Mean gradient is 6.4 mmHg. No significant mitral regurgitation. Mild diastolic dysfunction. Consider DA for evaluation of the mitral and aortic valve abnormalities if clinically indicated. Signature ----------------------------------------------------------------------------  Electronically signed by Marah Amato(Interpreting physician) on 2021  06:00 PM ---------------------------------------------------------------------------- ----------------------------------------------------------------------------  Electronically signed by Bianca Lamb(Sonographer) on 2021 06:04  PM ---------------------------------------------------------------------------- FINDINGS Left Atrium Left atrium is normal in size. Left Ventricle Small left ventricular cavity with moderate left ventricular wall thickness and choral systolic anterior motion and no significant left ventricular outflow tract gradient at rest or with valsalva maneuver. Normal ejection fraction and wall motion. Right Atrium Right atrium is normal in size. Right Ventricle Normal right ventricular size and function. Mitral Valve Posterior mitral annular and leaflet calcification with moderate mitral stenosis. Mean gradient is 6.4 mmHg. No significant mitral regurgitation. Aortic Valve poorly visualized aortic valve but Doppler studies suggest moderate aortic stenosis with peak and mean gradient across the aortic valve 34 and 20 mmHg respectively. Tricuspid Valve Normal tricuspid valve structure and function. Pulmonic Valve The pulmonic valve is normal in structure. Miscellaneous Normal aortic root dimension. Mild diastolic dysfunction.  M-mode / 2D Measurements & Calculations:   LVIDd:4.23 cm(3.7 - 5.6 cm)       Diastolic HAKPMH:06.90 ml  LVIDs:3.1 cm(2.2 - 4.0 cm)        Systolic OKCBTN:12.90 ml  IVSd:1.32 cm(0.6 - 1.1 cm)        Aortic Root:3.52 cm(2.0 - 3.7 cm)  LVPWd:1.36 cm(0.6 - 1.1 cm)       LA Dimension: 4 cm(1.9 - 4.0 cm)  Fractional Shortenin.71 % LA volume/Index: 50 ml /25m^2  Calculated LVEF (%): 60.56 %      AV Cusp Separation: 1.6 cm                                    LVOT:2.05 cm   Mitral:                               Aortic   Valve Area (P1/2-Time): 2 cm^2        Peak Velocity: 2.84 m/s  Peak E-Wave: 1.27 m/s                 Mean Velocity: 2.01 m/s  Peak A-Wave: 1.64 m/s                 Peak Gradient: 32.16 mmHg  E/A Ratio: 0.77                       Mean Gradient: 17.9 mmHg  Peak Gradient: 6.4 mmHg  Mean Gradient: 5.17 mmHg              Acceleration Time: 78.73 msec  P1/2t: 110.02 msec                    Area (continuity): 1.86 cm^2                                        AV VTI: 54.85 cm  Diastology / Tissue Doppler Lateral Wall E' velocity:0.05 m/s Lateral Wall E/E':22.48    Ct Chest Pulmonary Embolism W Contrast    Result Date: 3/2/2021  EXAMINATION: CTA OF THE CHEST 3/2/2021 3:17 pm TECHNIQUE: CTA of the chest was performed after the administration of intravenous contrast.  Multiplanar reformatted images are provided for review. MIP images are provided for review. Dose modulation, iterative reconstruction, and/or weight based adjustment of the mA/kV was utilized to reduce the radiation dose to as low as reasonably achievable. COMPARISON: 06/21/2020 HISTORY: ORDERING SYSTEM PROVIDED HISTORY: hypoxia TECHNOLOGIST PROVIDED HISTORY: hypoxia FINDINGS: Pulmonary Arteries: Pulmonary arteries are adequately opacified for evaluation. No evidence of intraluminal filling defect to suggest pulmonary embolism. Main pulmonary artery is mildly prominent at 3.3 cm. Mediastinum: Few nonspecific mediastinal lymph nodes which are upper limits of normal for size. The heart and pericardium demonstrate no acute abnormality with left atrial enlargement and mitral annular as well as coronary artery calcifications. There is no acute abnormality of the thoracic aorta.  Lungs/pleura: Linear bandlike opacities are present throughout the dependent portions of both lungs, more conspicuous in the lower lobes. Scarring in the inferior segment lingula and along the lateral aspect of the major fissure in the right middle lobe. There secretions in the distal trachea and proximal mainstem bronchi. No pneumothorax or pleural effusion. No evidence of pleural effusion or pneumothorax. Upper Abdomen: Gastric distension due to recently ingested contents. No acute abnormality. Soft Tissues/Bones: No acute bone or soft tissue abnormality. No evidence of pulmonary embolism. Mild prominence of the main pulmonary artery. Linear bandlike opacities in the dependent portions of both lungs which may be due to atelectasis, though sequelae of aspiration pneumonia is also a consideration in the setting of secretions in the distal trachea and mainstem bronchi. Left atrial enlargement.        Assessment and Plan:  Patient Active Problem List    Diagnosis Date Noted    Moderate mitral stenosis by prior echocardiogram      Priority: High    Moderate aortic stenosis by prior echocardiogram      Priority: High    S/P arthroscopic partial medial meniscectomy -- right knee 03/03/2021    Hypoxia 03/02/2021    Atrial fibrillation with RVR ([address] 03/02/2021    Aspiration pneumonia ([address] 03/02/2021    Morbidly obese ([address] 06/25/2020    Positive FIT (fecal immunochemical test) 11/08/2019    Tobacco abuse 03/16/2016    Noncompliance with therapeutic plan 06/26/2013    Obesity (BMI 30-39.9) 11/06/2012    Essential hypertension 11/06/2012    Diabetes type 2, controlled ([address] 07/23/2012    Dyslipidemia 07/23/2012    Hypothyroid 07/23/2012    Dermatophytosis of foot 11/02/2011        Discharge Medications:       Lakshmi Lerner   Home Medication Instructions LMA:[id number]    Printed on:03/04/21 1501   Medication Information                      acetaminophen (TYLENOL) 325 MG tablet  Take 2 tablets by mouth every 8 hours as needed for Pain             albuterol sulfate HFA (PROAIR HFA) 108 (90 Base) MCG/ACT inhaler  Inhale 2 puffs into the lungs every 4-6 hours as needed for Wheezing             amoxicillin-clavulanate (AUGMENTIN) 875-125 MG per tablet  Take 1 tablet by mouth 2 times daily for 7 days             aspirin  MG EC tablet  Take 1 tablet by mouth daily             atorvastatin (LIPITOR) 40 MG tablet  TAKE ONE TABLET BY MOUTH DAILY             Blood Glucose Monitoring Suppl (TRUE METRIX METER) W/DEVICE KIT  1 each by Does not apply route daily             Elastic Bandages & Supports (KNEE BRACE/HINGED BARS MEDIUM) MISC  1 each by Does not apply route daily             etodolac (LODINE) 400 MG tablet  Take 1 tablet by mouth 2 times daily             gabapentin (NEURONTIN) 300 MG capsule  Take 1 capsule by mouth three times daily for 90 days. glucose blood VI test strips (TRUE METRIX BLOOD GLUCOSE TEST) strip  1 each by In Vitro route daily             hydrOXYzine (ATARAX) 25 MG tablet  Take 1 tablet by mouth every 8 hours as needed for Anxiety             Lancets MISC  1 each by Does not apply route daily             levothyroxine (SYNTHROID) 175 MCG tablet  TAKE ONE TABLET BY MOUTH DAILY             lisinopril (PRINIVIL;ZESTRIL) 2.5 MG tablet  TAKE ONE TABLET BY MOUTH DAILY             metFORMIN (GLUCOPHAGE-XR) 500 MG extended release tablet  Take 2 tablets by mouth daily (with breakfast)             metoprolol (LOPRESSOR) 25 MG tablet  Take 0.5 tablets by mouth 2 times daily             SITagliptin (JANUVIA) 100 MG tablet  Take 1 tablet by mouth daily             tiZANidine (ZANAFLEX) 4 MG tablet  Take 1 tablet by mouth every 8 hours as needed (Back pain)             venlafaxine (EFFEXOR XR) 37.5 MG extended release capsule  Take 1 capsule by mouth daily                 Patient Instructions:    Activity: activity as tolerated  Diet: cardiac diet  Wound Care: keep wound clean and dry  Other: CAM monitor for 2 weeks     Disposition:   Discharge to Home    Follow up:  Patient will be followed by CYRUS Henderson - CNP in 1-2 weeks; Dr. Saleem Shahid in 2 weeks    CORE MEASURES on Discharge (if applicable)  ACE/ARB in CHF: NA  Statin in MI: NA  ASA in MI: NA  Statin in CVA: NA  Antiplatelet in CVA: NA    Total time spent on discharge services: 40 minutes    Including the following activities:  Evaluation and Management of patient  Discussion with patient and/or surrogate about current care plan  Coordination with Case Management and/or   Coordination of care with Consultants (if applicable)   Coordination of care with Receiving Facility Physician (if applicable)  Completion of DME forms (if applicable)  Preparation of Discharge Summary  Preparation of Medication Reconciliation  Preparation of Discharge Prescriptions    Signed:  CYRUS Bridges, NP-C  3/4/2021, 3:09 PM

## 2021-03-04 NOTE — PROGRESS NOTES
Occupational Therapy  Facility/Department: [facility] AT THE [facility] MED SURG  Daily Treatment Note  NAME: Lindsey Okeefe  : 1955  MRN: [id number]    Date of Service: 3/4/2021    Discharge Recommendations:  Continue to assess pending progress       Assessment      OT Education: OT Role;Plan of Care;Transfer Training;ADL Adaptive Strategies; Home Exercise Program  Patient Education: pt educated on transfer techniques focusing on hand placement, upright alignment, base of support and weight shifting strategies to improve safety and independence. Instruction in use of raised toilet seat with handles and shower seat with pt verbalizing good understanding. Barriers to Learning: none  Safety Devices  Safety Devices in place: Yes(Pt left in chair with call light on lap.  nursing present to assess glucose)  Type of devices: Call light within reach; Left in chair;Nurse notified  Restraints  Initially in place: No         Patient Diagnosis(es): There were no encounter diagnoses. has a past medical history of COPD (chronic obstructive pulmonary disease) ([facility] [address] Hyperlipidemia, Hyperthyroidism, Hypothyroidism, Obesity, Osteoarthritis, Sleep apnea, Tobacco abuse, and Type II or unspecified type diabetes mellitus without mention of complication, not stated as uncontrolled. has a past surgical history that includes Heel spur surgery; Tonsillectomy; Tubal ligation; Tooth Extraction; Knee arthroscopy (Right, 2021); and Knee arthroscopy (Right, 3/2/2021). Restrictions  Restrictions/Precautions  Restrictions/Precautions: General Precautions, Fall Risk  Subjective   General  Chart Reviewed: Yes  Patient assessed for rehabilitation services?: Yes  Family / Caregiver Present: Yes(son was present at start of tx. left within first few minutes)  Referring Practitioner: Emmett BRIDGES-MU  Diagnosis: hypoxia  Subjective  Subjective: Pt reported 8/10R knee pain at rest this date.   Pt had ice pack in place during strengthening and before standing. Reported decreased pain of 5/10 after 10 minutes of ice pack placement  General Comment  Comments: Pt in bedside chair with icew pack applied to right knee upon OT arrival. Pleasant and cooperative during all skilled intervention, followed directions well. Pain Assessment  Pain Level: 8(reduced to 5/10 with ice pack application)  Pain Type: Surgical pain  Pain Location: Knee  Pain Descriptors: Sore;Constant  Response to Pain Intervention: Other (Comment)(pt had ice pack application for pain management)  Vital Signs  Patient Currently in Pain: Yes(Simultaneous filing. User may not have seen previous data.)   Orientation     Objective    ADL  Equipment Provided: Sock aid  Grooming: Setup  UE Bathing: Setup  Toileting: Contact guard assistance  Additional Comments: Toileting with CGA during pivot transfers and sit <> stand for balance and safety awareness        Balance  Sitting Balance: Modified independent   Standing Balance: Contact guard assistance  Standing Balance  Time: 7 minutes  Activity: Functional mobility, strengthening and ADL performance  Comment: Fair + dynamic standing balance with no report of pain while standing, no LOB. Pt instruction on proper hand placement to get support from hand/wrist and reduce anterior lean with forearm onto Rollator  Functional Mobility  Functional - Mobility Device: Rolling Walker  Activity: To/from bathroom  Assist Level: Contact guard assistance                                                  Type of ROM/Therapeutic Exercise  Type of ROM/Therapeutic Exercise: AROM; Resistive Bands  Comment: min resist Theraband in all planes with rest breaks as needed and instruction in breathing technqiues to keep O2 sats above 93%.   Exercises  Scapular Protraction: 20 reps  Shoulder Depression: 20 reps  Shoulder ABduction: 20 reps  Horizontal ABduction: 20 reps                    Plan   Plan  Times per week: 7  Times per day: Daily  Current Treatment Recommendations: Strengthening, Endurance Training, Patient/Caregiver Education & Training, Self-Care / ADL, Balance Training, Functional Mobility Training, Safety Education & Training  Plan Comment: ther act, ther ex, ADL training  G-Code     OutComes Score                                                  AM-PAC Score             Goals  Short term goals  Time Frame for Short term goals: 20 visits  Short term goal 1: Pt will perform functional transfers/functional mobility with mod I using LRAD to increase independence with ADLs. Short term goal 2: Pt yeni tolerate 15 minutes or greater ther act/ther ex without intolerable SOB to increase functional activity tolerance  Short term goal 3: Pt will perform LB bathing/dressing with mod I using AE as needed to increase independence with ADLs. Short term goal 4: Pt will verbalize and demonstrate understanding of discharge education and recommendations.        Therapy Time   Individual Concurrent Group Co-treatment   Time In 1048         Time Out 1128         Minutes [address]

## 2021-03-04 NOTE — PROGRESS NOTES
Physical Therapy  Facility/Department: [facility] AT THE [facility] MED SURG  Daily Treatment Note  NAME: Christiano iLriano  : 1955  MRN: [id number]    Date of Service: 3/4/2021    Discharge Recommendations:  Continue to assess pending progress, Patient would benefit from continued therapy after discharge        Assessment   Treatment Diagnosis: generalized weakness  Prognosis: Good  PT Education: Goals; General Safety;Gait Training;PT Role;Transfer Training  Patient Education: Pt educated on above with good understanding. REQUIRES PT FOLLOW UP: Yes  Activity Tolerance  Activity Tolerance: Patient Tolerated treatment well     Patient Diagnosis(es): There were no encounter diagnoses. has a past medical history of COPD (chronic obstructive pulmonary disease) ([facility] [address] Hyperlipidemia, Hyperthyroidism, Hypothyroidism, Obesity, Osteoarthritis, Sleep apnea, Tobacco abuse, and Type II or unspecified type diabetes mellitus without mention of complication, not stated as uncontrolled. has a past surgical history that includes Heel spur surgery; Tonsillectomy; Tubal ligation; Tooth Extraction; Knee arthroscopy (Right, 2021); and Knee arthroscopy (Right, 3/2/2021). Restrictions  Restrictions/Precautions  Restrictions/Precautions: General Precautions, Fall Risk  Subjective   General  Chart Reviewed: Yes  Response To Previous Treatment: Patient with no complaints from previous session. Family / Caregiver Present: No  Subjective  Subjective: Pt reports right knee pain 3/10. Pt states that she hopes to go home tonight          Orientation  Orientation  Overall Orientation Status: Within Functional Limits  Cognition      Objective   Bed mobility  Comment: Pt ambulating to restroom upon therapist arrival.  Transfers  Sit to Stand: Stand by assistance;Supervision  Stand to sit: Stand by assistance;Supervision  Comment: Increased time to complete task.  Verbal cues to lock rollator before standing and cues for proper hand placement prior to

## 2021-03-04 NOTE — PROGRESS NOTES
Progress Note  Amando Corbett MD    OBJECTIVE:    Patient seen for f/u of Aspiration pneumonia ([address] She is feeling better. Has cpugh. Off oxygen     ROS:   Constitutional: negative  for fevers, and negative for chills. Respiratory: negative for shortness of breath, positive for cough, and negative for wheezing  Cardiovascular: negative for chest pain, and negative for palpitations  Gastrointestinal: negative for abdominal pain, negative for nausea,negative for vomiting, negative for diarrhea, and negative for constipation     All other systems were reviewed with the patient and are negative unless otherwise stated in HPI    OBJECTIVE:  Vitals:   Temp: 97.5 °F (36.4 °C)  BP: (!) 152/75  Resp: 18  Pulse: 70  SpO2: 93 %    24HR INTAKE/OUTPUT:      Intake/Output Summary (Last 24 hours) at 3/4/2021 1409  Last data filed at 3/4/2021 0348  Gross per 24 hour   Intake 500 ml   Output --   Net 500 ml     -----------------------------------------------------------------  Exam:  GEN:    Awake, alert and oriented x3. EYES:  EOMI, pupils equal   NECK: Supple. No lymphadenopathy. No carotid bruit  CVS:    regular rate and rhythm, no audible murmur  PULM:  has few rhonchi at bases,no wheezing or retractions, no acute respiratory distress  ABD:    Bowels sounds normal.  Abdomen is soft. No distention. no tenderness to palpation. EXT:   no edema bilaterally . No calf tenderness. NEURO: Moves all extremities. Motor and sensory are grossly intact  SKIN:  No rashes.   No skin lesions.    -----------------------------------------------------------------  Diagnostic Data:    · All available data reviewed  Lab Results   Component Value Date    WBC 8.3 03/04/2021    HGB 12.3 03/04/2021    MCV 84.1 03/04/2021     03/04/2021      Lab Results   Component Value Date    GLUCOSE 130 (H) 03/04/2021    BUN 8 03/04/2021    CREATININE 0.50 03/04/2021     03/04/2021    K 3.6 (L) 03/04/2021    CALCIUM 8.8 03/04/2021    CL 103 03/04/2021    CO2 29 03/04/2021       PROBLEM LIST:  Principal Problem:    Aspiration pneumonia (HCC)  Active Problems: Moderate mitral stenosis by prior echocardiogram    Moderate aortic stenosis by prior echocardiogram    Diabetes type 2, controlled ([facility])    Obesity (BMI 30-39. 9)    Essential hypertension    Tobacco abuse    Hypoxia    Atrial fibrillation with RVR ([facility])    S/P arthroscopic partial medial meniscectomy -- right knee  Resolved Problems:    * No resolved hospital problems. *      ASSESSMENT / PLAN:  Aspiration pneumonia ([address]  · improved  · D/c home on Augmentin  · S/p TKa- aftercare instructions as per DR Deepti Montes  · Nutrition status:  Well developed, well nourished with no malnutrition  · DVT prophylaxis: Lovenox   · High risk medications: none   · Disposition:  Discharge plan is home    Shannan Hale M.D.  3/4/2021  2:09 PM

## 2021-03-04 NOTE — PLAN OF CARE
Problem: Musculor/Skeletal Functional Status  Goal: Absence of falls  Outcome: Ongoing  Note: Patient is free from falls. Problem: Musculor/Skeletal Functional Status  Goal: Highest potential functional level  Outcome: Ongoing     Problem: Gas Exchange - Impaired:  Goal: Levels of oxygenation will improve  Description: Levels of oxygenation will improve  Outcome: Ongoing  Note: SpO2 is in the mid 90s on room air. Problem: Airway Clearance - Ineffective:  Goal: Clear lung sounds  Description: Clear lung sounds  Outcome: Ongoing  Note: Patient had diminished lung sounds with expiratory wheezes in the bases. Problem: Pain:  Goal: Pain level will decrease  Description: Pain level will decrease  Outcome: Ongoing  Note: Pain assessed routinely and as needed with a 0-10 scale. PRN pain medication given as appropriate per orders. Pain reassessed within an hour, will continue to monitor       Problem: Falls - Risk of:  Goal: Will remain free from falls  Description: Will remain free from falls  Outcome: Ongoing  Note: Fall risk assessment done and patient is a high risk for falls. Alarms on as needed for patient safety. Patient being monitored on a regular basis. No falls noted at this time.

## 2021-03-04 NOTE — PROGRESS NOTES
Physical Therapy  Facility/Department: [facility] AT THE [facility] MED SURG  Daily Treatment Note  NAME: Jam Johnson  : 1955  MRN: [id number]    Date of Service: 3/4/2021    Discharge Recommendations:  Continue to assess pending progress, Patient would benefit from continued therapy after discharge        Assessment   Treatment Diagnosis: generalized weakness  Prognosis: Good  PT Education: Goals; General Safety;Gait Training;PT Role;Transfer Training  Patient Education: Pt educated on above with good understanding. REQUIRES PT FOLLOW UP: Yes  Activity Tolerance  Activity Tolerance: Patient Tolerated treatment well     Patient Diagnosis(es): There were no encounter diagnoses. has a past medical history of COPD (chronic obstructive pulmonary disease) ([facility] [address] Hyperlipidemia, Hyperthyroidism, Hypothyroidism, Obesity, Osteoarthritis, Sleep apnea, Tobacco abuse, and Type II or unspecified type diabetes mellitus without mention of complication, not stated as uncontrolled. has a past surgical history that includes Heel spur surgery; Tonsillectomy; Tubal ligation; Tooth Extraction; Knee arthroscopy (Right, 2021); and Knee arthroscopy (Right, 3/2/2021). Restrictions  Restrictions/Precautions  Restrictions/Precautions: General Precautions, Fall Risk  Subjective   General  Chart Reviewed: Yes  Response To Previous Treatment: Patient with no complaints from previous session. Family / Caregiver Present: No  Subjective  Subjective: Pt reports right knee pain but does not numerically rate at this time. Orientation  Orientation  Overall Orientation Status: Within Functional Limits  Cognition      Objective   Bed mobility  Comment: Pt sitting EOB with student nurse upon arrival.  Transfers  Sit to Stand: Contact guard assistance  Stand to sit: Contact guard assistance  Comment: Increased time to complete task.  Verbal cues to lock rollator before standing and cues for proper hand placement prior to

## 2021-03-05 ENCOUNTER — TELEPHONE (OUTPATIENT)
Dept: PRIMARY CARE CLINIC | Age: 66
End: 2021-03-05

## 2021-03-05 RX ORDER — VARENICLINE TARTRATE
KIT
Qty: 1 BOX | Refills: 0 | Status: SHIPPED
Start: 2021-03-05 | End: 2021-06-28

## 2021-03-05 NOTE — TELEPHONE ENCOUNTER
Called patient and informed her that [facility] sent in a script for the Chantix and that she would be able to start. Verbalizes understanding.
Shelley ordered.
(goal /80)      All Future Testing planned in [facility]:  Lab Frequency Next Occurrence   HOLLIE LIANNA DIGITAL SCREEN BILATERAL Once 02/18/2021       Next Visit Date:  Future Appointments   Date Time Provider Christopher Trinity   3/9/2021 10:00 AM CYRUS Perez - CNP [facility]   3/17/2021  2:00 PM Kosta Esqueda MD [facility]   6/28/2021  9:00 AM Joel Johnson APRN - CNP [facility]   11/22/2021  2:00 PM Joel Johnson APRN - CNP [facility]            Patient Active Problem List:     Dermatophytosis of foot     Diabetes type 2, controlled ([address]     Dyslipidemia     Hypothyroid     Obesity (BMI 30-39. 9)     Essential hypertension     Noncompliance with therapeutic plan     Tobacco abuse     Positive FIT (fecal immunochemical test)     Morbidly obese (HCC)     Hypoxia     Atrial fibrillation with RVR (HCC)     Aspiration pneumonia (HCC)     S/P arthroscopic partial medial meniscectomy -- right knee     Moderate mitral stenosis by prior echocardiogram     Moderate aortic stenosis by prior echocardiogram

## 2021-03-05 NOTE — TELEPHONE ENCOUNTER
[facility] Trans[facility] Initial Follow Up Call    Outreach made within 2 business days of discharge: Yes    Patient: Tila Burroughs Patient : 1955   MRN: [id number]  Reason for Admission: There are no discharge diagnoses documented for the most recent discharge. Discharge Date: 3/4/21       Spoke with: YONATAN ABBOTT [facility]    Discharge department/facility: [facility]    TCM Interactive Patient Contact:  Was patient able to fill all prescriptions: Yes  Was patient instructed to bring all medications to the follow-up visit: Yes  Is patient taking all medications as directed in the discharge summary?  Yes  Does patient understand their discharge instructions: Yes  Does patient have questions or concerns that need addressed prior to 7-14 day follow up office visit: no, appt scheduled for 3/9/21    Scheduled appointment with PCP within 7-14 days    Follow Up  Future Appointments   Date Time Provider Christopher Petersen   3/9/2021 10:00 AM CYRUS Osei [facility]   3/17/2021  2:00 PM Rosy Cornejo MD [facility]   2021  9:00 AM CYRUS Osei [facility]NP[facility]   2021  2:00  [facility], APRN - CNP [address] [place], [place] [address]P       Aliyah Storm

## 2021-03-09 ENCOUNTER — OFFICE VISIT (OUTPATIENT)
Dept: PRIMARY CARE CLINIC | Age: 66
End: 2021-03-09
Payer: MEDICARE

## 2021-03-09 VITALS
HEART RATE: 74 BPM | TEMPERATURE: 96.7 F | DIASTOLIC BLOOD PRESSURE: 68 MMHG | SYSTOLIC BLOOD PRESSURE: 118 MMHG | BODY MASS INDEX: 38.11 KG/M2 | RESPIRATION RATE: 18 BRPM | WEIGHT: 222 LBS | OXYGEN SATURATION: 98 %

## 2021-03-09 DIAGNOSIS — I48.91 ATRIAL FIBRILLATION WITH RVR (HCC): Primary | ICD-10-CM

## 2021-03-09 PROCEDURE — 1111F DSCHRG MED/CURRENT MED MERGE: CPT | Performed by: NURSE PRACTITIONER

## 2021-03-09 PROCEDURE — 99495 TRANSJ CARE MGMT MOD F2F 14D: CPT | Performed by: NURSE PRACTITIONER

## 2021-03-09 NOTE — PATIENT INSTRUCTIONS
SURVEY:    You may be receiving a survey from [employer] regarding your visit today. Please complete the survey to enable us to provide the highest quality of care to you and your family. If you cannot score us a very good on any question, please call the office to discuss how we could have made your experience a very good one. Thank you. Patient Education        Atrial Fibrillation: Care Instructions  Your Care Instructions     Atrial fibrillation is an irregular and often fast heartbeat. Treating this condition is important for several reasons. It can cause blood clots, which can travel from your heart to your brain and cause a stroke. If you have a fast heartbeat, you may feel lightheaded, dizzy, and weak. An irregular heartbeat can also increase your risk for heart failure. Atrial fibrillation is often the result of another heart condition, such as high blood pressure or coronary artery disease. Making changes to improve your heart condition will help you stay healthy and active. Follow-up care is a key part of your treatment and safety. Be sure to make and go to all appointments, and call your doctor if you are having problems. It's also a good idea to know your test results and keep a list of the medicines you take. How can you care for yourself at home? Medicines    · Take your medicines exactly as prescribed. Call your doctor if you think you are having a problem with your medicine. You will get more details on the specific medicines your doctor prescribes.     · If your doctor has given you a blood thinner to prevent a stroke, be sure you get instructions about how to take your medicine safely. Blood thinners can cause serious bleeding problems.     · Do not take any vitamins, over-the-counter drugs, or herbal products without talking to your doctor first.   Lifestyle changes    · Do not smoke. Smoking can increase your chance of a stroke and heart attack.  If you need help quitting, talk vomiting. ? Pain, pressure, or a strange feeling in the back, neck, jaw, or upper belly or in one or both shoulders or arms. ? Lightheadedness or sudden weakness. ? A fast or irregular heartbeat. After you call 911, the  may tell you to chew 1 adult-strength or 2 to 4 low-dose aspirin. Wait for an ambulance. Do not try to drive yourself.     · You have symptoms of a stroke. These may include:  ? Sudden numbness, tingling, weakness, or loss of movement in your face, arm, or leg, especially on only one side of your body. ? Sudden vision changes. ? Sudden trouble speaking. ? Sudden confusion or trouble understanding simple statements. ? Sudden problems with walking or balance. ? A sudden, severe headache that is different from past headaches.     · You passed out (lost consciousness). Call your doctor now or seek immediate medical care if:    · You have new or increased shortness of breath.     · You feel dizzy or lightheaded, or you feel like you may faint.     · Your heart rate becomes irregular.     · You can feel your heart flutter in your chest or skip heartbeats. Tell your doctor if these symptoms are new or worse. Watch closely for changes in your health, and be sure to contact your doctor if you have any problems. Where can you learn more? Go to https://[employer]..Flomio. org and sign in to your "[employer] account. Enter U020 in the Kids Note box to learn more about \"Atrial Fibrillation: Care Instructions. \"     If you do not have an account, please click on the \"Sign Up Now\" link. Current as of: December 16, 2019               Content Version: 12.6  © 9328-0329 [employer]. Care instructions adapted under license by [facility] [facility] ([facility]). If you have questions about a medical condition or this instruction, always ask your healthcare professional. [address] any warranty or liability for your use of this information.          Patient Education        Atrial Fibrillation: Care Instructions  Your Care Instructions     Atrial fibrillation is an irregular and often fast heartbeat. Treating this condition is important for several reasons. It can cause blood clots, which can travel from your heart to your brain and cause a stroke. If you have a fast heartbeat, you may feel lightheaded, dizzy, and weak. An irregular heartbeat can also increase your risk for heart failure. Atrial fibrillation is often the result of another heart condition, such as high blood pressure or coronary artery disease. Making changes to improve your heart condition will help you stay healthy and active. Follow-up care is a key part of your treatment and safety. Be sure to make and go to all appointments, and call your doctor if you are having problems. It's also a good idea to know your test results and keep a list of the medicines you take. How can you care for yourself at home? Medicines    · Take your medicines exactly as prescribed. Call your doctor if you think you are having a problem with your medicine. You will get more details on the specific medicines your doctor prescribes.     · If your doctor has given you a blood thinner to prevent a stroke, be sure you get instructions about how to take your medicine safely. Blood thinners can cause serious bleeding problems.     · Do not take any vitamins, over-the-counter drugs, or herbal products without talking to your doctor first.   Lifestyle changes    · Do not smoke. Smoking can increase your chance of a stroke and heart attack. If you need help quitting, talk to your doctor about stop-smoking programs and medicines. These can increase your chances of quitting for good.     · Eat a heart-healthy diet.     · Stay at a healthy weight. Lose weight if you need to.     · Limit alcohol to 2 drinks a day for men and 1 drink a day for women. Too much alcohol can cause health problems.     · Avoid colds and flu.  Get a pneumococcal vaccine shot. If you have had one before, ask your doctor whether you need another dose. Get a flu shot every year. If you must be around people with colds or flu, wash your hands often. Activity    · If your doctor recommends it, get more exercise. Walking is a good choice. Bit by bit, increase the amount you walk every day. Try for at least 30 minutes on most days of the week. You also may want to swim, bike, or do other activities. Your doctor may suggest that you join a cardiac rehabilitation program so that you can have help increasing your physical activity safely.     · Start light exercise if your doctor says it is okay. Even a small amount will help you get stronger, have more energy, and manage stress. Walking is an easy way to get exercise. Start out by walking a little more than you did in the hospital. Gradually increase the amount you walk.     · When you exercise, watch for signs that your heart is working too hard. You are pushing too hard if you cannot talk while you are exercising. If you become short of breath or dizzy or have chest pain, sit down and rest immediately.     · Check your pulse regularly. Place two fingers on the artery at the palm side of your wrist, in line with your thumb. If your heartbeat seems uneven or fast, talk to your doctor. When should you call for help? Call 911 anytime you think you may need emergency care. For example, call if:    · You have symptoms of a heart attack. These may include:  ? Chest pain or pressure, or a strange feeling in the chest.  ? Sweating. ? Shortness of breath. ? Nausea or vomiting. ? Pain, pressure, or a strange feeling in the back, neck, jaw, or upper belly or in one or both shoulders or arms. ? Lightheadedness or sudden weakness. ? A fast or irregular heartbeat. After you call 911, the  may tell you to chew 1 adult-strength or 2 to 4 low-dose aspirin. Wait for an ambulance.  Do not try to drive yourself.     · You have symptoms of a stroke. These may include:  ? Sudden numbness, tingling, weakness, or loss of movement in your face, arm, or leg, especially on only one side of your body. ? Sudden vision changes. ? Sudden trouble speaking. ? Sudden confusion or trouble understanding simple statements. ? Sudden problems with walking or balance. ? A sudden, severe headache that is different from past headaches.     · You passed out (lost consciousness). Call your doctor now or seek immediate medical care if:    · You have new or increased shortness of breath.     · You feel dizzy or lightheaded, or you feel like you may faint.     · Your heart rate becomes irregular.     · You can feel your heart flutter in your chest or skip heartbeats. Tell your doctor if these symptoms are new or worse. Watch closely for changes in your health, and be sure to contact your doctor if you have any problems. Where can you learn more? Go to https://[employer].Scout. org and sign in to your [employer] account. Enter U020 in the [employer] box to learn more about \"Atrial Fibrillation: Care Instructions. \"     If you do not have an account, please click on the \"Sign Up Now\" link. Current as of: December 16, 2019               Content Version: 12.6  © 9754-4254 [email or web address]. Care instructions adapted under license by [facility] ([facility]). If you have questions about a medical condition or this instruction, always ask your healthcare professional. [place] [address] any warranty or liability for your use of this information.

## 2021-03-09 NOTE — PROGRESS NOTES
Post-Discharge Transitional Care Management Services or Hospital Follow Up      Gilakike Luevano   YOB: 1955    Date of Office Visit:  3/9/2021  Date of Hospital Admission: 3/2/21  Date of Hospital Discharge: 3/4/21  Risk of hospital readmission (high >=14%. Medium >=10%) :Readmission Risk Score: 9      Care management risk score Rising risk (score 2-5) and Complex Care (Scores >=6): 2     Non face to face  following discharge, date last encounter closed (first attempt may have been earlier): 3/5/2021  8:18 AM    Call initiated 2 business days of discharge: Yes    Patient Active Problem List   Diagnosis    Dermatophytosis of foot    Diabetes type 2, controlled ([facility] [address]    Dyslipidemia    Hypothyroid    Obesity (BMI 30-39. 9)    Essential hypertension    Noncompliance with therapeutic plan    Tobacco abuse    Positive FIT (fecal immunochemical test)    Morbidly obese (HCC)    Hypoxia    Atrial fibrillation with RVR (HCC)    Aspiration pneumonia (HCC)    S/P arthroscopic partial medial meniscectomy -- right knee    Moderate mitral stenosis by prior echocardiogram    Moderate aortic stenosis by prior echocardiogram       No Known Allergies    Medications listed as ordered at the time of discharge from [facility] [place], [address] [place], [place] [address]y  Medication Instructions CHRISTIE:    Printed on:03/09/21 1342   Medication Information                      acetaminophen (TYLENOL) 325 MG tablet  Take 2 tablets by mouth every 8 hours as needed for Pain             albuterol sulfate HFA (PROAIR HFA) 108 (90 Base) MCG/ACT inhaler  Inhale 2 puffs into the lungs every 4-6 hours as needed for Wheezing             amoxicillin-clavulanate (AUGMENTIN) 875-125 MG per tablet  Take 1 tablet by mouth 2 times daily for 7 days             aspirin  MG EC tablet  Take 1 tablet by mouth daily             atorvastatin (LIPITOR) 40 MG tablet  TAKE ONE TABLET BY MOUTH DAILY             Blood Glucose Monitoring Suppl (TRUE METRIX METER) W/DEVICE KIT  1 each by Does not apply route daily             Elastic Bandages & Supports (KNEE BRACE/HINGED BARS MEDIUM) MISC  1 each by Does not apply route daily             etodolac (LODINE) 400 MG tablet  Take 1 tablet by mouth 2 times daily             gabapentin (NEURONTIN) 300 MG capsule  Take 1 capsule by mouth three times daily for 90 days. glucose blood VI test strips (TRUE METRIX BLOOD GLUCOSE TEST) strip  1 each by In Vitro route daily             hydrOXYzine (ATARAX) 25 MG tablet  Take 1 tablet by mouth every 8 hours as needed for Anxiety             Lancets MISC  1 each by Does not apply route daily             levothyroxine (SYNTHROID) 175 MCG tablet  TAKE ONE TABLET BY MOUTH DAILY             lisinopril (PRINIVIL;ZESTRIL) 2.5 MG tablet  TAKE ONE TABLET BY MOUTH DAILY             metFORMIN (GLUCOPHAGE-XR) 500 MG extended release tablet  Take 2 tablets by mouth daily (with breakfast)             metoprolol (LOPRESSOR) 25 MG tablet  Take 0.5 tablets by mouth 2 times daily             SITagliptin (JANUVIA) 100 MG tablet  Take 1 tablet by mouth daily             tiZANidine (ZANAFLEX) 4 MG tablet  Take 1 tablet by mouth every 8 hours as needed (Back pain)             varenicline (CHANTIX STARTING MONTH CASSIE) 0.5 MG X 11 & 1 MG X 42 tablet  Take by mouth. venlafaxine (EFFEXOR XR) 37.5 MG extended release capsule  Take 1 capsule by mouth daily                   Medications marked \"taking\" at this time  Outpatient Medications Marked as Taking for the 3/9/21 encounter (Office Visit) with Anna Johnson APRN - CNP   Medication Sig Dispense Refill    varenicline (CHANTIX STARTING MONTH CASSIE) 0.5 MG X 11 & 1 MG X 42 tablet Take by mouth.  1 box 0    amoxicillin-clavulanate (AUGMENTIN) 875-125 MG per tablet Take 1 tablet by mouth 2 times daily for 7 days 14 tablet 0    aspirin  MG EC tablet Take 1 tablet by mouth daily      metoprolol (LOPRESSOR) 25 MG tablet Take 0.5 tablets by mouth 2 times daily 90 tablet 0    gabapentin (NEURONTIN) 300 MG capsule Take 1 capsule by mouth three times daily for 90 days. 90 capsule 2    acetaminophen (TYLENOL) 325 MG tablet Take 2 tablets by mouth every 8 hours as needed for Pain 30 tablet 0    Elastic Bandages & Supports (KNEE BRACE/HINGED BARS MEDIUM) MISC 1 each by Does not apply route daily 1 each 0    etodolac (LODINE) 400 MG tablet Take 1 tablet by mouth 2 times daily 60 tablet 0    hydrOXYzine (ATARAX) 25 MG tablet Take 1 tablet by mouth every 8 hours as needed for Anxiety 270 tablet 0    lisinopril (PRINIVIL;ZESTRIL) 2.5 MG tablet TAKE ONE TABLET BY MOUTH DAILY 90 tablet 3    levothyroxine (SYNTHROID) 175 MCG tablet TAKE ONE TABLET BY MOUTH DAILY 90 tablet 3    venlafaxine (EFFEXOR XR) 37.5 MG extended release capsule Take 1 capsule by mouth daily 90 capsule 3    atorvastatin (LIPITOR) 40 MG tablet TAKE ONE TABLET BY MOUTH DAILY 90 tablet 3    metFORMIN (GLUCOPHAGE-XR) 500 MG extended release tablet Take 2 tablets by mouth daily (with breakfast) 180 tablet 3    SITagliptin (JANUVIA) 100 MG tablet Take 1 tablet by mouth daily 90 tablet 3    albuterol sulfate HFA (PROAIR HFA) 108 (90 Base) MCG/ACT inhaler Inhale 2 puffs into the lungs every 4-6 hours as needed for Wheezing 1 Inhaler 0    Blood Glucose Monitoring Suppl (TRUE METRIX METER) W/DEVICE KIT 1 each by Does not apply route daily 1 kit 0    glucose blood VI test strips (TRUE METRIX BLOOD GLUCOSE TEST) strip 1 each by In Vitro route daily 100 each 3    Lancets MISC 1 each by Does not apply route daily 100 each 3        Medications patient taking as of now reconciled against medications ordered at time of hospital discharge: Yes    Chief Complaint   Patient presents with    Follow-Up from Hospital     Transitional care, A-fib       History of Present illness - Follow up of Hospital diagnosis(es):     Hospital Course: The patient was admitted for the above.   Rama Crystal Everton Montero a 78 y.o. female admitted with aspiration pneumonia.  She underwent a arthroscopy for partial medial meniscectomy of the right knee.  Intraoperatively she flipped in and out of atrial fibrillation with RVR and on conversion went back to a normal sinus rhythm.  Postoperatively she became hypoxic and was requiring 5 L of oxygen.  She was negative for pulmonary embolism.  However CT did show some aspiration.  Cardiology was consulted to evaluate the atrial fibrillation.  She will have a CAM monitor placed today on discharge to be worn for 2 weeks. Amelia Hurtado will follow-up with cardiology as an outpatient for planned stress test.  At this time we will not start anticoagulation and will let cardiology evaluate the need after evaluation of the CAM recording and stress test. Amelia Hurtado is currently off all oxygen and doing well.  She is tolerating activity.  She is ambulating with a walker and her pain is controlled. Inpatient course: Discharge summary reviewed- see chart. Interval history/Current status:     Austin Caban is here today and states she is feeling well. She is wearing the CAM monitor and has not had any episodes of palpitations. She started the metoprolol 12.5 mg twice daily that was ordered. She has follow-up planned with cardiology next week. A comprehensive review of systems was negative except for what was noted in the HPI. Vitals:    03/09/21 1004   BP: 118/68   Site: Left Upper Arm   Pulse: 74   Resp: 18   Temp: 96.7 °F (35.9 °C)   TempSrc: Temporal   SpO2: 98%   Weight: 222 lb (100.7 kg)     Body mass index is 38.11 kg/m².    Wt Readings from Last 3 Encounters:   03/09/21 222 lb (100.7 kg)   03/04/21 230 lb 9.6 oz (104.6 kg)   02/19/21 225 lb 8 oz (102.3 kg)     BP Readings from Last 3 Encounters:   03/09/21 118/68   03/04/21 (!) 152/75   03/02/21 (!) 144/79        Physical Exam:  General Appearance: alert and oriented to person, place and time, well-developed and well-nourished, in no acute distress and obese  Skin: warm and dry, no rash or erythema  ENT: oropharynx clear and moist with normal mucous membranes  Neck: neck supple and non tender without mass, no thyromegaly or thyroid nodules, no cervical lymphadenopathy   Pulmonary/Chest: clear to auscultation bilaterally- no wheezes, rales or rhonchi, normal air movement, no respiratory distress  Cardiovascular: normal rate, regular rhythm and systolic murmur present- 3/6 at 2nd right intercostal space  Abdomen: soft, non-tender  Extremities: no edema    Assessment/Plan:  1. Atrial fibrillation with RVR (HCC)    Continue metoprolol along with all other medications. Follow-up with cardiology as planned.   - OR DISCHARGE MEDS RECONCILED W/ CURRENT OUTPATIENT MED LIST        Medical Decision Making: moderate complexity

## 2021-03-17 ENCOUNTER — OFFICE VISIT (OUTPATIENT)
Dept: CARDIOLOGY | Age: 66
End: 2021-03-17
Payer: MEDICARE

## 2021-03-17 VITALS
SYSTOLIC BLOOD PRESSURE: 109 MMHG | DIASTOLIC BLOOD PRESSURE: 65 MMHG | BODY MASS INDEX: 36.19 KG/M2 | HEART RATE: 60 BPM | HEIGHT: 65 IN | OXYGEN SATURATION: 98 % | WEIGHT: 217.2 LBS | RESPIRATION RATE: 18 BRPM

## 2021-03-17 DIAGNOSIS — I48.0 PAF (PAROXYSMAL ATRIAL FIBRILLATION) (HCC): Primary | ICD-10-CM

## 2021-03-17 DIAGNOSIS — I35.0 MODERATE AORTIC STENOSIS BY PRIOR ECHOCARDIOGRAM: ICD-10-CM

## 2021-03-17 DIAGNOSIS — I05.0 MODERATE MITRAL STENOSIS BY PRIOR ECHOCARDIOGRAM: ICD-10-CM

## 2021-03-17 DIAGNOSIS — Z72.0 TOBACCO ABUSE: ICD-10-CM

## 2021-03-17 PROCEDURE — 1090F PRES/ABSN URINE INCON ASSESS: CPT | Performed by: FAMILY MEDICINE

## 2021-03-17 PROCEDURE — 1111F DSCHRG MED/CURRENT MED MERGE: CPT | Performed by: FAMILY MEDICINE

## 2021-03-17 PROCEDURE — 4004F PT TOBACCO SCREEN RCVD TLK: CPT | Performed by: FAMILY MEDICINE

## 2021-03-17 PROCEDURE — G8417 CALC BMI ABV UP PARAM F/U: HCPCS | Performed by: FAMILY MEDICINE

## 2021-03-17 PROCEDURE — 3017F COLORECTAL CA SCREEN DOC REV: CPT | Performed by: FAMILY MEDICINE

## 2021-03-17 PROCEDURE — G8427 DOCREV CUR MEDS BY ELIG CLIN: HCPCS | Performed by: FAMILY MEDICINE

## 2021-03-17 PROCEDURE — 99214 OFFICE O/P EST MOD 30 MIN: CPT | Performed by: FAMILY MEDICINE

## 2021-03-17 PROCEDURE — G8400 PT W/DXA NO RESULTS DOC: HCPCS | Performed by: FAMILY MEDICINE

## 2021-03-17 PROCEDURE — 1123F ACP DISCUSS/DSCN MKR DOCD: CPT | Performed by: FAMILY MEDICINE

## 2021-03-17 PROCEDURE — 4040F PNEUMOC VAC/ADMIN/RCVD: CPT | Performed by: FAMILY MEDICINE

## 2021-03-17 PROCEDURE — G8484 FLU IMMUNIZE NO ADMIN: HCPCS | Performed by: FAMILY MEDICINE

## 2021-03-17 NOTE — PATIENT INSTRUCTIONS
SURVEY:    You may be receiving a survey from [employer] regarding your visit today. Please complete the survey to enable us to provide the highest quality of care to you and your family. If you cannot score us a very good on any question, please call the office to discuss how we could have made your experience a very good one. Thank you.

## 2021-03-17 NOTE — PROGRESS NOTES
Rafi Cardoso am scribing for and in the presence of Ivonne Partida. Aly Ying MD, MS, F.A.C.C..    Patient: Mary Tobar  : 1955   Primary Care Physician: Justo Johnson  Today's Date: 3/17/2021    Dear Lazarus Bijou, APRN - CNP,     HPI:  Ms. Minesh Pollock is a 78 y.o. female who has been electively admitted to the hospital for atrial fibrillation. She has been told in the past that she has a heart murmur. Family cardiac history includes her brother who has stents. She has never had any cardiac testing in the past. She is a smoker, starting at age 15 at 1 ppd. Echocardiogram done on 3/2/2021 showed moderate mitral stenosis and moderate aortic stenosis. Ms. Minesh Pollock is here today for a hospital follow up. She does have chest pressure occasionally that lasts seconds. She has stable shortness of breath, but not worsening. She denies any lightheadedness or dizziness. She denies any leg swelling. She denies feeling any palpitations. No syncopal episodes. She denied any abdominal pain, bleeding problems, problems with her medications or any other concerns at this time. Bleeding Risks: Ms. Minesh Pollock denies any current or recent bleeding problems including a history of a GI bleed, ulcers, recent or upcoming surgeries, blood in her stool or black tarry stools or blood in her urine. Exercise Tolerance: Ms. Minesh Pollock reports that she has a fair exercise tolerance. Her says that she could walk about 1 block without developing chest discomfort or significant shortness of breath. Past Medical History:   Diagnosis Date    COPD (chronic obstructive pulmonary disease) ([facility] [address]     Hyperlipidemia     Hyperthyroidism     Hypothyroidism     Obesity     Osteoarthritis     Sleep apnea     Tobacco abuse     Type II or unspecified type diabetes mellitus without mention of complication, not stated as uncontrolled        CURRENT ALLERGIES: Patient has no known allergies. REVIEW OF SYSTEMS: 14 systems were reviewed.  Pertinent positives and negatives as above, all else negative. Past Surgical History:   Procedure Laterality Date    HEEL SPUR SURGERY      right heel    KNEE ARTHROSCOPY Right 03/02/2021    partial medial meniscetomy    KNEE ARTHROSCOPY Right 3/2/2021    KNEE ARTHROSCOPY-WITH MEDIAL AND LATERAL partial MENISCECTOMY performed by Petra Kellogg MD at [address] [place] [address]      All of back teeth    TUBAL LIGATION      Social History:  Social History     Tobacco Use    Smoking status: Current Every Day Smoker     Packs/day: 1.00     Years: 40.00     Pack years: 40.00    Smokeless tobacco: Never Used    Tobacco comment: smoked on the way to surgery 03/02/21   Substance Use Topics    Alcohol use: No     Alcohol/week: 0.0 standard drinks    Drug use: No        CURRENT MEDICATIONS:  Outpatient Medications Marked as Taking for the 3/17/21 encounter (Office Visit) with Angus Cleary MD   Medication Sig Dispense Refill    varenicline (CHANTIX STARTING MONTH PAK) 0.5 MG X 11 & 1 MG X 42 tablet Take by mouth. 1 box 0    aspirin  MG EC tablet Take 1 tablet by mouth daily      metoprolol (LOPRESSOR) 25 MG tablet Take 0.5 tablets by mouth 2 times daily 90 tablet 0    gabapentin (NEURONTIN) 300 MG capsule Take 1 capsule by mouth three times daily for 90 days.  90 capsule 2    tiZANidine (ZANAFLEX) 4 MG tablet Take 1 tablet by mouth every 8 hours as needed (Back pain) 15 tablet 0    acetaminophen (TYLENOL) 325 MG tablet Take 2 tablets by mouth every 8 hours as needed for Pain 30 tablet 0    Elastic Bandages & Supports (KNEE BRACE/HINGED BARS MEDIUM) MISC 1 each by Does not apply route daily 1 each 0    etodolac (LODINE) 400 MG tablet Take 1 tablet by mouth 2 times daily 60 tablet 0    hydrOXYzine (ATARAX) 25 MG tablet Take 1 tablet by mouth every 8 hours as needed for Anxiety 270 tablet 0    lisinopril (PRINIVIL;ZESTRIL) 2.5 MG tablet TAKE ONE TABLET BY MOUTH DAILY 90 tablet 3    levothyroxine (SYNTHROID) 175 MCG tablet TAKE ONE TABLET BY MOUTH DAILY 90 tablet 3    venlafaxine (EFFEXOR XR) 37.5 MG extended release capsule Take 1 capsule by mouth daily 90 capsule 3    atorvastatin (LIPITOR) 40 MG tablet TAKE ONE TABLET BY MOUTH DAILY 90 tablet 3    metFORMIN (GLUCOPHAGE-XR) 500 MG extended release tablet Take 2 tablets by mouth daily (with breakfast) 180 tablet 3    SITagliptin (JANUVIA) 100 MG tablet Take 1 tablet by mouth daily 90 tablet 3    albuterol sulfate HFA (PROAIR HFA) 108 (90 Base) MCG/ACT inhaler Inhale 2 puffs into the lungs every 4-6 hours as needed for Wheezing 1 Inhaler 0    Blood Glucose Monitoring Suppl (TRUE METRIX METER) W/DEVICE KIT 1 each by Does not apply route daily 1 kit 0    glucose blood VI test strips (TRUE METRIX BLOOD GLUCOSE TEST) strip 1 each by In Vitro route daily 100 each 3       FAMILY HISTORY: family history includes Cancer in her father; Diabetes in her brother, paternal grandmother, and sister. PHYSICAL EXAM:   /65 (Site: Left Upper Arm, Position: Sitting, Cuff Size: Large Adult)   Pulse 60   Resp 18   Ht 5' 4.5\" (1.638 m)   Wt 217 lb 3.2 oz (98.5 kg)   SpO2 98%   BMI 36.71 kg/m²  Body mass index is 36.71 kg/m². Constitutional: She is oriented to person, place, and time. She appears well-developed and well-nourished. In no acute distress. HEENT: Normocephalic and atraumatic. No JVD present. Carotid bruit is not present. No mass and no thyromegaly present. No lymphadenopathy present. Cardiovascular: Normal rate, regular rhythm, normal heart sounds. Exam reveals no gallop and no friction rubs. 3/6 systolic murmur, 2nd intercostal space on the RIGHT just lateral to the sternum. Pulmonary/Chest: Effort normal and breath sounds normal. No respiratory distress. She has no wheezes, rhonchi or rales. Abdominal: Soft, non-tender. Bowel sounds and aorta are normal. She exhibits no organomegaly, mass or bruit. Extremities: Trace.  No cyanosis or paroxysmal atrial fibrillation will plan to just monitor for now.  Beta Blocker: Continue Metoprolol tartrate (Lopressor) 25 mg 1/2 tab bid.  Calcium Channel Blocker: Not indicated at this time.  Anti-Arrhythmic: Not indicated. [id number] Score for Atrial Fibrillation Stroke Risk   Risk   Factors  Component Value   C CHF No 0   H HTN Yes 1   A2 Age >= 76 No,  (77 y.o.) 0   D DM Yes 1   S2 Prior Stroke/TIA No 0   V Vascular Disease No 0   A Age 74-69 Yes,  (77 y.o.) 1   Sc Sex female 1    [id number]  Score  4   Score last updated 3/3/21 1:90 PM EST    Click here for a link to the UpToDate guideline \"Atrial Fibrillation: Anticoagulation therapy to prevent embolization    Disclaimer: Risk Score calculation is dependent on accuracy of patient problem list and past encounter diagnosis.  Stroke Risk: CHADS2-VASc Score: 4/9 (4% stroke risk)   Anticoagulation: Not indicated at this time.  I will review the results of her CAM monitor and call her with the results. · Moderate Aortic and Mitral Stenosis: symptomatic at least when in atrial fibrillation with RVR, spontaneously resolved  · Beta Blocker: Continue Metoprolol tartrate (Lopressor) 25 mg 1/2 tab bid. · ACE Inibitor/ARB: Continue lisinopril 5 mg, 1/2 tab daily. · Diuretics: Not indicated at this time. · I told them to continue wearing lower extremity compression stockings and I advised them to try and keep their legs up whenever possible and to limit salt in their diet. · I will get yearly echocardiograms. Tobacco Abuse Counseling: I spent several minutes discussing the dangers of tobacco abuse as well as multiple methods for trying to quit smoking. In the end, Ms. Du Valdovinos said that she did not want any assistance at this time but would continue to try and quit reduce and eventually quit smoking in the near future. Once again, thank you for allowing me to participate in this patients care.  Please do not hesitate to contact me if I could be of any further assistance. I told Ms. Malika Nagel to call my office if she had any problems, but otherwise told her to Return in about 6 months (around 9/17/2021). However, I would be happy to see her sooner should the need arise. Sincerely,  Zandra Skiff. Stef ROBLES, MS, F.A.C.C. [facility] Specialist    [address] [place] [address]  Phone: [phone], Fax: [phone]     I believe that the risk of significant morbidity and mortality related to the patient's current medical conditions are: Intermediate. The documentation recorded by the scribe, accurately and completely reflects the services I personally performed and the decisions made by me. Nasir Adams MD, MS, F.A.C.C.  March 17, 2021

## 2021-03-18 ENCOUNTER — TELEPHONE (OUTPATIENT)
Dept: PRIMARY CARE CLINIC | Age: 66
End: 2021-03-18

## 2021-03-18 NOTE — TELEPHONE ENCOUNTER
Called patient and reminded her to reschedule her mammogram that she no showed for on 1/19/21, phone number provided for pt to call to reschedule. Verbalizes understanding.

## 2021-03-19 ENCOUNTER — HOSPITAL ENCOUNTER (OUTPATIENT)
Dept: WOMENS IMAGING | Age: 66
Discharge: HOME | End: 2021-03-21
Payer: MEDICARE

## 2021-03-19 DIAGNOSIS — Z12.31 OTHER SCREENING MAMMOGRAM: ICD-10-CM

## 2021-03-19 PROCEDURE — 77063 BREAST TOMOSYNTHESIS BI: CPT

## 2021-03-22 ENCOUNTER — TELEPHONE (OUTPATIENT)
Dept: PRIMARY CARE CLINIC | Age: 66
End: 2021-03-22

## 2021-03-22 NOTE — PROCEDURES
[facility], [address] [place] [address]                                 EVENT MONITOR    PATIENT NAME: Emanuel Buckley                      :        1955  MED REC NO:   [id number]                              ROOM:       7273  ACCOUNT NO:   [de-identified]                           ADMIT DATE: 2021  PROVIDER:     Agustin العراقي MD    CARDIOVASCULAR DIAGNOSTIC DEPARTMENT    DATE OF STUDY:  2021    ORDERING PROVIDER:  Yakelin Andrade. Rafaela Lew CNP    PRIMARY CARE PROVIDER:  Lorin Ann CNP    INTERPRETING PHYSICIAN: Agustin العراقي MD    DIAGNOSIS:  Paroxysmal atrial fibrillation. PHYSICIAN INTERPRETATION:  Recording Length: 12 days, 20 hours. Findings Summary  1. Predominant rhythm: Normal sinus rhythm. 2. Atrial tachycardia (AT) 6 episodes. Longest: 26 beats at average 108  bpm up to 135 bpm. Fastest: 18 beats at average 145 bpm up to 178 bpm.  3. Premature atrial contractions 0.32%. Symptoms were reported but were associated with normal sinus rhythm. Clinical correlation required.           Damion Dove MD    D: 2021 11:26:35       T: 2021 11:28:16     [email or web address]  Job#: [id number]     Doc#: Unknown    CC:  CYRUS Allen - MU

## 2021-03-22 NOTE — TELEPHONE ENCOUNTER
----- Message from [address] [place] [address], CYRUS - CNP sent at 3/21/2021 11:18 AM EDT -----  Results are normal, please call patient and make them aware.

## 2021-03-23 ENCOUNTER — TELEPHONE (OUTPATIENT)
Dept: CARDIOLOGY | Age: 66
End: 2021-03-23

## 2021-04-06 ENCOUNTER — APPOINTMENT (OUTPATIENT)
Dept: GENERAL RADIOLOGY | Age: 66
End: 2021-04-06
Attending: ANESTHESIOLOGY
Payer: MEDICARE

## 2021-04-06 ENCOUNTER — HOSPITAL ENCOUNTER (OUTPATIENT)
Age: 66
Setting detail: OUTPATIENT SURGERY
Discharge: HOME | End: 2021-04-06
Attending: ANESTHESIOLOGY | Admitting: ANESTHESIOLOGY
Payer: MEDICARE

## 2021-04-06 VITALS
BODY MASS INDEX: 37.56 KG/M2 | HEART RATE: 88 BPM | SYSTOLIC BLOOD PRESSURE: 141 MMHG | DIASTOLIC BLOOD PRESSURE: 61 MMHG | RESPIRATION RATE: 18 BRPM | HEIGHT: 64 IN | WEIGHT: 220 LBS | OXYGEN SATURATION: 94 % | TEMPERATURE: 97.4 F

## 2021-04-06 PROCEDURE — 2709999900 HC NON-CHARGEABLE SUPPLY: Performed by: ANESTHESIOLOGY

## 2021-04-06 PROCEDURE — 6360000004 HC RX CONTRAST MEDICATION: Performed by: ANESTHESIOLOGY

## 2021-04-06 PROCEDURE — 3209999900 FLUORO FOR SURGICAL PROCEDURES

## 2021-04-06 PROCEDURE — 3600000002 HC SURGERY LEVEL 2 BASE: Performed by: ANESTHESIOLOGY

## 2021-04-06 PROCEDURE — 6360000002 HC RX W HCPCS: Performed by: ANESTHESIOLOGY

## 2021-04-06 PROCEDURE — 2500000003 HC RX 250 WO HCPCS: Performed by: ANESTHESIOLOGY

## 2021-04-06 PROCEDURE — 2580000003 HC RX 258: Performed by: ANESTHESIOLOGY

## 2021-04-06 RX ORDER — LIDOCAINE HYDROCHLORIDE 10 MG/ML
INJECTION, SOLUTION EPIDURAL; INFILTRATION; INTRACAUDAL; PERINEURAL PRN
Status: DISCONTINUED | OUTPATIENT
Start: 2021-04-06 | End: 2021-04-06 | Stop reason: ALTCHOICE

## 2021-04-06 RX ORDER — METHYLPREDNISOLONE ACETATE 40 MG/ML
INJECTION, SUSPENSION INTRA-ARTICULAR; INTRALESIONAL; INTRAMUSCULAR; SOFT TISSUE PRN
Status: DISCONTINUED | OUTPATIENT
Start: 2021-04-06 | End: 2021-04-06 | Stop reason: ALTCHOICE

## 2021-04-06 RX ORDER — SODIUM CHLORIDE 0.9 % (FLUSH) 0.9 %
10 SYRINGE (ML) INJECTION PRN
Status: DISCONTINUED | OUTPATIENT
Start: 2021-04-06 | End: 2021-04-06 | Stop reason: HOSPADM

## 2021-04-06 RX ORDER — SODIUM CHLORIDE, SODIUM LACTATE, POTASSIUM CHLORIDE, CALCIUM CHLORIDE 600; 310; 30; 20 MG/100ML; MG/100ML; MG/100ML; MG/100ML
INJECTION, SOLUTION INTRAVENOUS CONTINUOUS
Status: DISCONTINUED | OUTPATIENT
Start: 2021-04-06 | End: 2021-04-06 | Stop reason: HOSPADM

## 2021-04-06 RX ORDER — SODIUM CHLORIDE 9 MG/ML
INJECTION INTRAVENOUS PRN
Status: DISCONTINUED | OUTPATIENT
Start: 2021-04-06 | End: 2021-04-06 | Stop reason: ALTCHOICE

## 2021-04-06 RX ORDER — SODIUM CHLORIDE 0.9 % (FLUSH) 0.9 %
10 SYRINGE (ML) INJECTION EVERY 12 HOURS SCHEDULED
Status: DISCONTINUED | OUTPATIENT
Start: 2021-04-06 | End: 2021-04-06 | Stop reason: HOSPADM

## 2021-04-06 RX ORDER — DEXAMETHASONE SODIUM PHOSPHATE 4 MG/ML
INJECTION, SOLUTION INTRA-ARTICULAR; INTRALESIONAL; INTRAMUSCULAR; INTRAVENOUS; SOFT TISSUE PRN
Status: DISCONTINUED | OUTPATIENT
Start: 2021-04-06 | End: 2021-04-06 | Stop reason: ALTCHOICE

## 2021-04-06 ASSESSMENT — PAIN - FUNCTIONAL ASSESSMENT: PAIN_FUNCTIONAL_ASSESSMENT: 0-10

## 2021-04-06 NOTE — PROGRESS NOTES
Patient returned to [place] [address], voices no complaints or concerns, discharge paperwork given to patient, answered all questions, and verbalized understanding, ambulated out of department without difficulty with family member.

## 2021-04-06 NOTE — OP NOTE
Procedure Note    Patient Name: Henri Daniels   YOB: 1955  Room/Bed: Room/bed info not found  Medical Record Number: [id number]  Date: 4/6/2021       Mallampati Airway Assessment:  Mallampati Class II - (soft palate, fauces & uvula are visible)    ASA Classification:  Class 2 - A normal healthy patient with mild systemic disease      Preoperative Diagnosis:  Lumbar degenerative disc disease or disc protrusion with radicular pain. Postoperative Diagnosis:  Same as pre-op diagnosis. Procedure Performed:  Lumbar epidural steroid injection at L3-L4 under fluoroscopy guidance without IV sedation. Indication for the Procedure: The patient failed conservative management  for pain in the low back radiating to lower extremities. The patient is undergoing lumbar epidural steroid injection. As the patient is not responding to conservative management and it is interfering with activities of daily living we decided to proceed with lumbar epidural steroid injection. The procedure and risks were discussed with the patient and an informed consent was obtained    Procedure:  Patient's vital signs including BP, EKG and SaO2 were monitored by the RN and they remained stable during the procedure. A meaningful communication was kept up with the patient throughout  the procedure. The patient is placed in prone position. Skin over the back was prepped and draped in sterile manner. Then using fluoroscopy the L3-L4 interspace was observed and the skin and deep tissues in the midline were infiltrated with 5 ml of 1% lidocaine. The #20-gauge, 3-1/2 inch Tuohy needle was inserted through the skin wheal and the epidural space was identified using loss of resistance technique with normal saline. This was confirmed with AP and lateral views using fluoroscopy after injecting about 2 ml of Omnipaque-180 and observing the spread of the contrast in the epidural space.  Then after negative aspiration a total of 40 mg of Depo-medrol, 4 mg of Dexamethasone, and 2 mL of preservative free normal saline was injected into the epidural space. The needle was removed and a Band-Aid was placed over the needle insertion site. Patient's vital signs remained stable and the patient tolerated the procedure well. The patient was discharged home in stable condition and will be followed in the pain clinic in the next few weeks for further planning.     EBL: None

## 2021-04-13 ENCOUNTER — HOSPITAL ENCOUNTER (OUTPATIENT)
Age: 66
Setting detail: OUTPATIENT SURGERY
Discharge: HOME | End: 2021-04-13
Attending: ANESTHESIOLOGY | Admitting: ANESTHESIOLOGY
Payer: MEDICARE

## 2021-04-13 ENCOUNTER — APPOINTMENT (OUTPATIENT)
Dept: GENERAL RADIOLOGY | Age: 66
End: 2021-04-13
Attending: ANESTHESIOLOGY
Payer: MEDICARE

## 2021-04-13 VITALS
HEART RATE: 64 BPM | SYSTOLIC BLOOD PRESSURE: 136 MMHG | HEIGHT: 65 IN | BODY MASS INDEX: 36.65 KG/M2 | RESPIRATION RATE: 18 BRPM | OXYGEN SATURATION: 94 % | WEIGHT: 220 LBS | TEMPERATURE: 96.9 F | DIASTOLIC BLOOD PRESSURE: 57 MMHG

## 2021-04-13 PROCEDURE — 2580000003 HC RX 258: Performed by: ANESTHESIOLOGY

## 2021-04-13 PROCEDURE — 3600000002 HC SURGERY LEVEL 2 BASE: Performed by: ANESTHESIOLOGY

## 2021-04-13 PROCEDURE — 6360000002 HC RX W HCPCS: Performed by: ANESTHESIOLOGY

## 2021-04-13 PROCEDURE — 3209999900 FLUORO FOR SURGICAL PROCEDURES

## 2021-04-13 PROCEDURE — 2500000003 HC RX 250 WO HCPCS: Performed by: ANESTHESIOLOGY

## 2021-04-13 PROCEDURE — 2709999900 HC NON-CHARGEABLE SUPPLY: Performed by: ANESTHESIOLOGY

## 2021-04-13 RX ORDER — BUPIVACAINE HYDROCHLORIDE 5 MG/ML
INJECTION, SOLUTION EPIDURAL; INTRACAUDAL PRN
Status: DISCONTINUED | OUTPATIENT
Start: 2021-04-13 | End: 2021-04-13 | Stop reason: ALTCHOICE

## 2021-04-13 RX ORDER — SODIUM CHLORIDE 0.9 % (FLUSH) 0.9 %
5-40 SYRINGE (ML) INJECTION PRN
Status: DISCONTINUED | OUTPATIENT
Start: 2021-04-13 | End: 2021-04-13 | Stop reason: HOSPADM

## 2021-04-13 RX ORDER — SODIUM CHLORIDE 0.9 % (FLUSH) 0.9 %
5-40 SYRINGE (ML) INJECTION EVERY 12 HOURS SCHEDULED
Status: DISCONTINUED | OUTPATIENT
Start: 2021-04-13 | End: 2021-04-13 | Stop reason: HOSPADM

## 2021-04-13 RX ORDER — METHYLPREDNISOLONE ACETATE 40 MG/ML
INJECTION, SUSPENSION INTRA-ARTICULAR; INTRALESIONAL; INTRAMUSCULAR; SOFT TISSUE PRN
Status: DISCONTINUED | OUTPATIENT
Start: 2021-04-13 | End: 2021-04-13 | Stop reason: ALTCHOICE

## 2021-04-13 RX ORDER — SODIUM CHLORIDE 9 MG/ML
25 INJECTION, SOLUTION INTRAVENOUS PRN
Status: DISCONTINUED | OUTPATIENT
Start: 2021-04-13 | End: 2021-04-13 | Stop reason: HOSPADM

## 2021-04-13 RX ORDER — SODIUM CHLORIDE, SODIUM LACTATE, POTASSIUM CHLORIDE, CALCIUM CHLORIDE 600; 310; 30; 20 MG/100ML; MG/100ML; MG/100ML; MG/100ML
INJECTION, SOLUTION INTRAVENOUS CONTINUOUS
Status: DISCONTINUED | OUTPATIENT
Start: 2021-04-13 | End: 2021-04-13 | Stop reason: HOSPADM

## 2021-04-13 RX ORDER — LIDOCAINE HYDROCHLORIDE 10 MG/ML
INJECTION, SOLUTION EPIDURAL; INFILTRATION; INTRACAUDAL; PERINEURAL PRN
Status: DISCONTINUED | OUTPATIENT
Start: 2021-04-13 | End: 2021-04-13 | Stop reason: ALTCHOICE

## 2021-04-13 RX ADMIN — SODIUM CHLORIDE, POTASSIUM CHLORIDE, SODIUM LACTATE AND CALCIUM CHLORIDE: 600; 310; 30; 20 INJECTION, SOLUTION INTRAVENOUS at 12:47

## 2021-04-13 NOTE — OP NOTE
fluoroscopy the view of the joint space was optimized. The skin and deep tissues over the joint space were anesthetized with 8mL of 1% Lidocaine. The #23-gauge, 3-1/2 inch spinal needle was introduced through the skin wheal under fluoroscopic guidance such that the tip of the needle lies in the joint space. Then after negative aspiration a mixture of 2 0.5% Marcaine with 40 mg of Depo-Medrol was injected into the joint space. This was done at the levels of C3-C4, C4-C5, and C5-C6 on the Left side. A total of 40 mg of depo-medrol and 2 mL of 0.5% Marcaine was used and was divided in equal amounts among the joints. After removing the needles Band-Aids were placed over the needle insertion sites. Patient's vital signs remained stable and tolerated the procedure well. Patient was discharged home in stable condition and will be followed in the pain clinic in the next few weeks for further planning.     EBL: None

## 2021-05-24 DIAGNOSIS — M54.2 CHRONIC NECK PAIN: ICD-10-CM

## 2021-05-24 DIAGNOSIS — G89.29 CHRONIC BILATERAL LOW BACK PAIN WITH LEFT-SIDED SCIATICA: ICD-10-CM

## 2021-05-24 DIAGNOSIS — M54.42 CHRONIC BILATERAL LOW BACK PAIN WITH LEFT-SIDED SCIATICA: ICD-10-CM

## 2021-05-24 DIAGNOSIS — G89.29 CHRONIC NECK PAIN: ICD-10-CM

## 2021-05-24 RX ORDER — GABAPENTIN 300 MG/1
CAPSULE ORAL
Qty: 270 CAPSULE | Refills: 0 | Status: SHIPPED | OUTPATIENT
Start: 2021-05-24 | End: 2021-06-28

## 2021-06-08 ENCOUNTER — APPOINTMENT (OUTPATIENT)
Dept: GENERAL RADIOLOGY | Age: 66
End: 2021-06-08
Attending: ANESTHESIOLOGY
Payer: MEDICARE

## 2021-06-08 ENCOUNTER — HOSPITAL ENCOUNTER (OUTPATIENT)
Age: 66
Setting detail: OUTPATIENT SURGERY
Discharge: HOME | End: 2021-06-08
Attending: ANESTHESIOLOGY | Admitting: ANESTHESIOLOGY
Payer: MEDICARE

## 2021-06-08 VITALS
SYSTOLIC BLOOD PRESSURE: 122 MMHG | TEMPERATURE: 97 F | BODY MASS INDEX: 38.32 KG/M2 | OXYGEN SATURATION: 93 % | WEIGHT: 230 LBS | RESPIRATION RATE: 18 BRPM | HEART RATE: 72 BPM | HEIGHT: 65 IN | DIASTOLIC BLOOD PRESSURE: 52 MMHG

## 2021-06-08 PROCEDURE — 3209999900 FLUORO FOR SURGICAL PROCEDURES

## 2021-06-08 PROCEDURE — 2500000003 HC RX 250 WO HCPCS: Performed by: ANESTHESIOLOGY

## 2021-06-08 PROCEDURE — 6360000002 HC RX W HCPCS: Performed by: ANESTHESIOLOGY

## 2021-06-08 PROCEDURE — 2709999900 HC NON-CHARGEABLE SUPPLY: Performed by: ANESTHESIOLOGY

## 2021-06-08 PROCEDURE — 3600000002 HC SURGERY LEVEL 2 BASE: Performed by: ANESTHESIOLOGY

## 2021-06-08 RX ORDER — SODIUM CHLORIDE 0.9 % (FLUSH) 0.9 %
5-40 SYRINGE (ML) INJECTION PRN
Status: DISCONTINUED | OUTPATIENT
Start: 2021-06-08 | End: 2021-06-08 | Stop reason: HOSPADM

## 2021-06-08 RX ORDER — SODIUM CHLORIDE 9 MG/ML
25 INJECTION, SOLUTION INTRAVENOUS PRN
Status: DISCONTINUED | OUTPATIENT
Start: 2021-06-08 | End: 2021-06-08 | Stop reason: HOSPADM

## 2021-06-08 RX ORDER — METHYLPREDNISOLONE ACETATE 40 MG/ML
INJECTION, SUSPENSION INTRA-ARTICULAR; INTRALESIONAL; INTRAMUSCULAR; SOFT TISSUE PRN
Status: DISCONTINUED | OUTPATIENT
Start: 2021-06-08 | End: 2021-06-08 | Stop reason: ALTCHOICE

## 2021-06-08 RX ORDER — LIDOCAINE HYDROCHLORIDE 10 MG/ML
INJECTION, SOLUTION EPIDURAL; INFILTRATION; INTRACAUDAL; PERINEURAL PRN
Status: DISCONTINUED | OUTPATIENT
Start: 2021-06-08 | End: 2021-06-08 | Stop reason: ALTCHOICE

## 2021-06-08 RX ORDER — BUPIVACAINE HYDROCHLORIDE 5 MG/ML
INJECTION, SOLUTION EPIDURAL; INTRACAUDAL PRN
Status: DISCONTINUED | OUTPATIENT
Start: 2021-06-08 | End: 2021-06-08 | Stop reason: ALTCHOICE

## 2021-06-08 RX ORDER — SODIUM CHLORIDE, SODIUM LACTATE, POTASSIUM CHLORIDE, CALCIUM CHLORIDE 600; 310; 30; 20 MG/100ML; MG/100ML; MG/100ML; MG/100ML
INJECTION, SOLUTION INTRAVENOUS CONTINUOUS
Status: DISCONTINUED | OUTPATIENT
Start: 2021-06-08 | End: 2021-06-08 | Stop reason: HOSPADM

## 2021-06-08 RX ORDER — SODIUM CHLORIDE 0.9 % (FLUSH) 0.9 %
5-40 SYRINGE (ML) INJECTION EVERY 12 HOURS SCHEDULED
Status: DISCONTINUED | OUTPATIENT
Start: 2021-06-08 | End: 2021-06-08 | Stop reason: HOSPADM

## 2021-06-08 ASSESSMENT — PAIN - FUNCTIONAL ASSESSMENT: PAIN_FUNCTIONAL_ASSESSMENT: 0-10

## 2021-06-08 ASSESSMENT — PAIN DESCRIPTION - DESCRIPTORS: DESCRIPTORS: ACHING

## 2021-06-08 NOTE — PROGRESS NOTES
1256 pt received in endo bay 1 after pain injection. Dr. Alysa Hopkins spoke with pt. Discharge instructions given to pt. 1302 Pt discharged with own wheeled walker.

## 2021-06-08 NOTE — OP NOTE
Procedure Note    Patient Name: Lance Borges   YOB: 1955  Room/Bed: Room/bed info not found  Medical Record Number: [id number]  Date: 6/8/2021       Mallampati Airway Assessment:  Mallampati Class II - (soft palate, fauces & uvula are visible)    ASA Classification:  Class 2 - A normal healthy patient with mild systemic disease        Preoperative Diagnosis:    1. Degenerative lumbar disc disease. 2.  Lumbar spondylosis. 3.  Facet joint arthropathy. Postoperative Diagnosis:   1. Degenerative lumbar disc disease. 2.  Lumbar spondylosis. 3.  Facet joint arthropathy. Procedure Performed[de-identified]  Lumbar facet joint injections at the levels of  L4 - 5 and L5 - S1 on the Bilateral side with fluoroscopy guidance, without IV sedation. Indication for the Procedure: The patient had history of chronic low back pain which is not responding well to the conservative treatment. The patient's pain is mostly axial in nature. Pain is interfering with the activities of daily living. Physical examination revealed facet tenderness and facet loading is positive. We decided to try lumbar facet joint injection for diagnostic as well as for therapeutic purposes. The procedure and its risks were discussed with the patient and an informed consent was obtained. .  Procedure: Patient's vital signs including BP, EKG and SaO2 were monitored by RN and they remained stable during the procedure. A meaningful communication was kept up with the patient throughout   the procedure. The patient is placed in prone position. Skin over the back was prepped and draped in sterile manner. Under fluoroscopy the facet joints were identified and were palpated, and the following joints were found to be tender:   L4 - 5 and L5 - S1 on the Bilateral side.   Hence we decided to inject these joints in the following way:  Using fluoroscopy the facet joints were identified and by adjusting the angle of the fluoroscopy in the oblique position, the view of the joint space was optimized. The skin and deep tissues over the joint space were anesthetized with 10 ml of Lidocaine 1%. Then a #23-gauge, 5 inch spinal needle was introduced through the skin wheal under fluoroscopoc guidance such that the tip of the needle lies in the joint space. Then after negative aspiration a mixture of 0.5% Marcaine with methylprednisolone was injected into the joint space. This was done at the levels of  L4 - 5 and L5 - S1 on the Bilateral side. A total of 40 mg of Depo-Medrol and 3 ml of 0.5% Marcaine was used and was divided in equal amounts among the joints. After removing the needles Band-Aids were placed over the needle insertion sites. Patient's vital signs remained stable and tolerated the procedure well. The patient was discharged home in stable condition and will be followed in the pain clinic in the next few weeks for further planning.     EBL: None

## 2021-06-28 ENCOUNTER — OFFICE VISIT (OUTPATIENT)
Dept: PRIMARY CARE CLINIC | Age: 66
End: 2021-06-28
Payer: MEDICARE

## 2021-06-28 VITALS
OXYGEN SATURATION: 98 % | WEIGHT: 212.1 LBS | HEART RATE: 74 BPM | RESPIRATION RATE: 20 BRPM | BODY MASS INDEX: 35.84 KG/M2 | DIASTOLIC BLOOD PRESSURE: 72 MMHG | SYSTOLIC BLOOD PRESSURE: 118 MMHG | TEMPERATURE: 97.8 F

## 2021-06-28 DIAGNOSIS — M54.42 CHRONIC BILATERAL LOW BACK PAIN WITH LEFT-SIDED SCIATICA: ICD-10-CM

## 2021-06-28 DIAGNOSIS — Z23 NEED FOR SHINGLES VACCINE: ICD-10-CM

## 2021-06-28 DIAGNOSIS — G89.29 CHRONIC BILATERAL LOW BACK PAIN WITH LEFT-SIDED SCIATICA: ICD-10-CM

## 2021-06-28 DIAGNOSIS — E11.9 CONTROLLED TYPE 2 DIABETES MELLITUS WITHOUT COMPLICATION, WITHOUT LONG-TERM CURRENT USE OF INSULIN (HCC): Primary | ICD-10-CM

## 2021-06-28 DIAGNOSIS — Z23 NEED FOR TDAP VACCINATION: ICD-10-CM

## 2021-06-28 DIAGNOSIS — E03.1 CONGENITAL HYPOTHYROIDISM WITHOUT GOITER: ICD-10-CM

## 2021-06-28 DIAGNOSIS — M54.2 CHRONIC NECK PAIN: ICD-10-CM

## 2021-06-28 DIAGNOSIS — G89.29 CHRONIC NECK PAIN: ICD-10-CM

## 2021-06-28 PROCEDURE — 1090F PRES/ABSN URINE INCON ASSESS: CPT | Performed by: NURSE PRACTITIONER

## 2021-06-28 PROCEDURE — G8427 DOCREV CUR MEDS BY ELIG CLIN: HCPCS | Performed by: NURSE PRACTITIONER

## 2021-06-28 PROCEDURE — 3051F HG A1C>EQUAL 7.0%<8.0%: CPT | Performed by: NURSE PRACTITIONER

## 2021-06-28 PROCEDURE — 2022F DILAT RTA XM EVC RTNOPTHY: CPT | Performed by: NURSE PRACTITIONER

## 2021-06-28 PROCEDURE — 4040F PNEUMOC VAC/ADMIN/RCVD: CPT | Performed by: NURSE PRACTITIONER

## 2021-06-28 PROCEDURE — 90715 TDAP VACCINE 7 YRS/> IM: CPT | Performed by: NURSE PRACTITIONER

## 2021-06-28 PROCEDURE — 1123F ACP DISCUSS/DSCN MKR DOCD: CPT | Performed by: NURSE PRACTITIONER

## 2021-06-28 PROCEDURE — G8417 CALC BMI ABV UP PARAM F/U: HCPCS | Performed by: NURSE PRACTITIONER

## 2021-06-28 PROCEDURE — 99214 OFFICE O/P EST MOD 30 MIN: CPT | Performed by: NURSE PRACTITIONER

## 2021-06-28 PROCEDURE — 3017F COLORECTAL CA SCREEN DOC REV: CPT | Performed by: NURSE PRACTITIONER

## 2021-06-28 PROCEDURE — 90471 IMMUNIZATION ADMIN: CPT | Performed by: NURSE PRACTITIONER

## 2021-06-28 PROCEDURE — 4004F PT TOBACCO SCREEN RCVD TLK: CPT | Performed by: NURSE PRACTITIONER

## 2021-06-28 PROCEDURE — G8400 PT W/DXA NO RESULTS DOC: HCPCS | Performed by: NURSE PRACTITIONER

## 2021-06-28 RX ORDER — GABAPENTIN 300 MG/1
600 CAPSULE ORAL 3 TIMES DAILY
Qty: 270 CAPSULE | Refills: 0
Start: 2021-06-28 | End: 2021-10-25

## 2021-06-28 RX ORDER — ZOSTER VACCINE RECOMBINANT, ADJUVANTED 50 MCG/0.5
0.5 KIT INTRAMUSCULAR SEE ADMIN INSTRUCTIONS
Qty: 0.5 ML | Refills: 0 | Status: SHIPPED | OUTPATIENT
Start: 2021-06-28 | End: 2021-12-25

## 2021-06-28 SDOH — ECONOMIC STABILITY: FOOD INSECURITY: WITHIN THE PAST 12 MONTHS, YOU WORRIED THAT YOUR FOOD WOULD RUN OUT BEFORE YOU GOT MONEY TO BUY MORE.: NEVER TRUE

## 2021-06-28 SDOH — ECONOMIC STABILITY: FOOD INSECURITY: WITHIN THE PAST 12 MONTHS, THE FOOD YOU BOUGHT JUST DIDN'T LAST AND YOU DIDN'T HAVE MONEY TO GET MORE.: NEVER TRUE

## 2021-06-28 ASSESSMENT — ENCOUNTER SYMPTOMS
DIARRHEA: 0
VISUAL CHANGE: 0
COUGH: 0
PHOTOPHOBIA: 0
BOWEL INCONTINENCE: 0
TROUBLE SWALLOWING: 0
WHEEZING: 0
CONSTIPATION: 0
SHORTNESS OF BREATH: 0
VOMITING: 0
NAUSEA: 0
SORE THROAT: 0
RHINORRHEA: 0
BACK PAIN: 1
ABDOMINAL PAIN: 0

## 2021-06-28 ASSESSMENT — SOCIAL DETERMINANTS OF HEALTH (SDOH): HOW HARD IS IT FOR YOU TO PAY FOR THE VERY BASICS LIKE FOOD, HOUSING, MEDICAL CARE, AND HEATING?: SOMEWHAT HARD

## 2021-06-28 NOTE — PROGRESS NOTES
inhibitor/angiotensin II receptor blocker is being taken. She does not see a podiatrist.Eye exam is current. Back Pain  This is a recurrent problem. The current episode started more than 1 month ago. The problem occurs daily. The problem has been gradually worsening since onset. The pain is present in the lumbar spine. The quality of the pain is described as aching, burning, cramping, shooting and stabbing. The pain radiates to the left thigh. The pain is at a severity of 6/10. The pain is moderate. The pain is worse during the day. The symptoms are aggravated by standing and position. Stiffness is present at night. Associated symptoms include numbness. Pertinent negatives include no abdominal pain, bladder incontinence, bowel incontinence, chest pain, dysuria, fever, headaches, leg pain, paresis, paresthesias, pelvic pain, perianal numbness, tingling, weakness or weight loss. Risk factors include lack of exercise, menopause, obesity and sedentary lifestyle. She has tried heat, ice, muscle relaxant, NSAIDs and analgesics for the symptoms. The treatment provided mild relief. Neck Pain   This is a recurrent problem. The current episode started more than 1 month ago. The problem occurs daily. The problem has been unchanged. The pain is associated with an unknown factor. The pain is present in the midline. The quality of the pain is described as aching. The pain is at a severity of 4/10. The pain is mild. The symptoms are aggravated by position. The pain is worse during the day. Stiffness is present at night. Associated symptoms include numbness. Pertinent negatives include no chest pain, fever, headaches, leg pain, pain with swallowing, paresis, photophobia, syncope, tingling, trouble swallowing, visual change, weakness or weight loss. She has tried acetaminophen, ice, NSAIDs and heat for the symptoms. The treatment provided mild relief.          Past Medical History:     Past Medical History:   Diagnosis Date    COPD (chronic obstructive pulmonary disease) ([facility] [address]     Hyperlipidemia     Hyperthyroidism     Hypothyroidism     Obesity     Osteoarthritis     Sleep apnea     Tobacco abuse     Type II or unspecified type diabetes mellitus without mention of complication, not stated as uncontrolled       Reviewed all health maintenance requirements and ordered appropriate tests  Health Maintenance Due   Topic Date Due    Shingles Vaccine (2 of 3) 09/02/2016       Past Surgical History:     Past Surgical History:   Procedure Laterality Date    FACET JOINT INJECTION Left 4/13/2021    CERVICAL FACET JOINT-C3-C4, C4-C5,C5-C6 performed by Leo Hood MD at [facility] [address]      right heel    KNEE ARTHROSCOPY Right 03/02/2021    partial medial meniscetomy    KNEE ARTHROSCOPY Right 3/2/2021    KNEE ARTHROSCOPY-WITH MEDIAL AND LATERAL partial MENISCECTOMY performed by Gina Elmore MD at [facility] N/A 4/6/2021    EPIDURAL STEROID INJECTION-LUMBAR L3-4 performed by Leo Hood MD at [facility] Bilateral 6/8/2021    LUMBAR FACET, L4-5,L5-S1 performed by Leo Hood MD at [address] [place] [address] of back teeth    TUBAL LIGATION          Medications:       Prior to Admission medications    Medication Sig Start Date End Date Taking? Authorizing Provider   zoster recombinant adjuvanted vaccine [facility]) 50 MCG/0.5ML SUSR injection Inject 0.5 mLs into the muscle See Admin Instructions 1 dose now and repeat in 2-6 months 6/28/21 12/25/21 Yes Alejandro Galvan Might, APRN - CNP   gabapentin (NEURONTIN) 300 MG capsule Take 2 capsules by mouth 3 times daily for 90 days.  6/28/21 9/26/21 Yes Alejandro Johnson, APRN - MU   metoprolol tartrate (LOPRESSOR) 25 MG tablet TAKE 1/2 TABLET BY MOUTH TWICE A DAY 5/11/21  Yes Alejandro Galvan Might, APRN - CNP   aspirin  MG EC tablet Take 1 tablet by mouth daily 3/2/21  Yes Gina Elmore MD   tiZANidine (ZANAFLEX) 4 MG membranes are moist.   Eyes:      General: No scleral icterus. Conjunctiva/sclera: Conjunctivae normal.   Cardiovascular:      Rate and Rhythm: Normal rate and regular rhythm. Heart sounds: No murmur heard. Pulmonary:      Effort: Pulmonary effort is normal.      Breath sounds: Normal breath sounds. No wheezing. Abdominal:      General: Bowel sounds are normal. There is no distension. Palpations: Abdomen is soft. Tenderness: There is no abdominal tenderness. Musculoskeletal:      Cervical back: Neck supple. Tenderness present. Muscular tenderness present. No spinous process tenderness. Decreased range of motion. Lumbar back: Tenderness present. No spasms. Decreased range of motion. Back:       Right lower leg: No edema. Left lower leg: No edema. Skin:     General: Skin is warm and dry. Findings: No rash. Neurological:      Mental Status: She is alert and oriented to person, place, and time.    Psychiatric:         Mood and Affect: Mood normal.         Behavior: Behavior normal.         Data:     Lab Results   Component Value Date     03/04/2021    K 3.6 03/04/2021     03/04/2021    CO2 29 03/04/2021    BUN 8 03/04/2021    CREATININE 0.50 03/04/2021    GLUCOSE 130 03/04/2021    GLUCOSE 103 05/24/2012    PROT 7.0 03/02/2021    LABALBU 3.1 03/02/2021    LABALBU 4.5 10/27/2011    BILITOT 0.27 03/02/2021    ALKPHOS 125 03/02/2021    AST 12 03/02/2021    ALT 11 03/02/2021     Lab Results   Component Value Date    WBC 8.3 03/04/2021    RBC 4.52 03/04/2021    RBC 4.83 05/24/2012    HGB 12.3 03/04/2021    HCT 38.0 03/04/2021    MCV 84.1 03/04/2021    MCH 27.2 03/04/2021    MCHC 32.4 03/04/2021    RDW 13.1 03/04/2021     03/04/2021     05/24/2012    MPV 10.3 03/04/2021     Lab Results   Component Value Date    TSH 0.87 12/29/2020     Lab Results   Component Value Date    CHOL 239 12/29/2020    HDL 36 12/29/2020    LABA1C 7.0 02/19/2021 Assessment/Plan:      Diagnosis Orders   1. Controlled type 2 diabetes mellitus without complication, without long-term current use of insulin (HCC)   DIABETES FOOT EXAM    CBC Auto Differential    ALT    AST    Basic Metabolic Panel    Lipid Panel   2. Congenital hypothyroidism without goiter  T4, Free    TSH without Reflex   3. Chronic bilateral low back pain with left-sided sciatica  gabapentin (NEURONTIN) 300 MG capsule   4. Chronic neck pain  gabapentin (NEURONTIN) 300 MG capsule   5. Need for shingles vaccine   DIABETES FOOT EXAM   6. Need for Tdap vaccination  Tdap (age 6y and older) IM ([address])     Continue all current medication. A1c is stable at 6.7 however labs are due now. We will increase gabapentin to 600 mg 3 times daily. Recommend doing this slowly by starting increase at night and then twice daily and then 3 times daily. Patient is agreeable. We will see her back in 6 months, sooner if any problems. 1.  Cyndy received counseling on the following healthy behaviors: nutrition, exercise and medication adherence  2. Patient given educational materials - see patient instructions  3. Was a self-tracking handout given in paper form or via [employer]? No  If yes, see orders or list here. 4.  Discussed use, benefit, and side effects of prescribed medications. Barriers to medication compliance addressed. All patient questions answered. Pt voiced understanding. 5.  Reviewed prior labs and health maintenance  6. Continue current medications, diet and exercise. Completed Refills   Requested Prescriptions     Signed Prescriptions Disp Refills    zoster recombinant adjuvanted vaccine (SHINGRIX) 50 MCG/0.5ML SUSR injection 0.5 mL 0     Sig: Inject 0.5 mLs into the muscle See Admin Instructions 1 dose now and repeat in 2-6 months    gabapentin (NEURONTIN) 300 MG capsule 270 capsule 0     Sig: Take 2 capsules by mouth 3 times daily for 90 days.          Return in about 6 months (around 12/28/2021) for Check up- A1c in office.

## 2021-06-28 NOTE — PROGRESS NOTES
After obtaining consent, and per orders of Jose Eduardo Johnson CNP, injection of Boostrix given in Right deltoid by Louisa Eldridge LPN. Patient instructed to remain in clinic for 20 minutes afterwards, and to report any adverse reaction to me immediately.

## 2021-06-28 NOTE — PATIENT INSTRUCTIONS
SURVEY:    You may be receiving a survey from [employer] regarding your visit today. Please complete the survey to enable us to provide the highest quality of care to you and your family. If you cannot score us a very good on any question, please call the office to discuss how we could have made your experience a very good one. Thank you. Jose A Johnson, APRN-CNP  Daniel Santos, APRN-CNP  Abbi Harper, ALEXANDRA Rivas, ALEXANDRA Ward, SAUL Price, [facility]    Patient Education        Learning About Type 2 Diabetes  What is type 2 diabetes? Type 2 diabetes is a condition in which you have too much sugar (glucose) in your blood. Glucose is a type of sugar produced in your body when carbohydrates and other foods are digested. It provides energy to cells throughout the body. Normally, blood sugar levels increase after you eat a meal. When blood sugar rises, cells in the pancreas release insulin, which causes the body to absorb sugar from the blood and lowers the blood sugar level to normal.  When you have type 2 diabetes, sugar stays in the blood rather than entering the body's cells to be used for energy. This results in high blood sugar. It happens when your body can't use insulin the right way. Over time, high blood sugar can harm many parts of the body, such as your eyes, heart, blood vessels, nerves, and kidneys. It can also increase your risk for other health problems (complications). What can you expect with type 2 diabetes? Nikki Benson keep hearing about how important it is to keep your blood sugar within a target range. That's because over time, high blood sugar can lead to serious problems. It can:  · Harm your eyes, nerves, and kidneys. · Damage your blood vessels, leading to heart disease and stroke. · Reduce blood flow and cause nerve damage to parts of your body, especially your feet. This can cause slow healing and pain when you walk.   · Make your immune system weak and less able to fight target range. · Taking medicines, if you need them. · Quitting smoking, if you smoke. · Keeping your blood pressure and cholesterol under control. Follow-up care is a key part of your treatment and safety. Be sure to make and go to all appointments, and call your doctor if you are having problems. It's also a good idea to know your test results and keep a list of the medicines you take. Where can you learn more? Go to https://[email or web address].[email or web address] org and sign in to your [employer] account. Enter O476 in the [employer] box to learn more about \"Learning About Type 2 Diabetes. \"     If you do not have an account, please click on the \"Sign Up Now\" link. Current as of: August 31, 2020               Content Version: 12.9  © 3121-3370 Healthwise, Incorporated. Care instructions adapted under license by [facility]. If you have questions about a medical condition or this instruction, always ask your healthcare professional. [address] any warranty or liability for your use of this information.

## 2021-07-15 ENCOUNTER — TELEPHONE (OUTPATIENT)
Dept: PRIMARY CARE CLINIC | Age: 66
End: 2021-07-15

## 2021-07-15 NOTE — TELEPHONE ENCOUNTER
Called and spoke with patient and reminded her to have labs done that Audelia Duenas had ordered. Verbalizes understanding.

## 2021-07-16 ENCOUNTER — HOSPITAL ENCOUNTER (OUTPATIENT)
Age: 66
Discharge: HOME | End: 2021-07-16
Payer: MEDICARE

## 2021-07-16 ENCOUNTER — TELEPHONE (OUTPATIENT)
Dept: PRIMARY CARE CLINIC | Age: 66
End: 2021-07-16

## 2021-07-16 DIAGNOSIS — E03.1 CONGENITAL HYPOTHYROIDISM WITHOUT GOITER: ICD-10-CM

## 2021-07-16 DIAGNOSIS — E11.9 CONTROLLED TYPE 2 DIABETES MELLITUS WITHOUT COMPLICATION, WITHOUT LONG-TERM CURRENT USE OF INSULIN (HCC): ICD-10-CM

## 2021-07-16 LAB
ABSOLUTE EOS #: 0.32 K/UL (ref 0–0.44)
ABSOLUTE IMMATURE GRANULOCYTE: 0 K/UL (ref 0–0.3)
ABSOLUTE LYMPH #: 2.24 K/UL (ref 1.1–3.7)
ABSOLUTE MONO #: 0.72 K/UL (ref 0.1–1.2)
ALT SERPL-CCNC: 14 U/L (ref 5–33)
ANION GAP SERPL CALCULATED.3IONS-SCNC: 11 MMOL/L (ref 9–17)
AST SERPL-CCNC: 11 U/L
BASOPHILS # BLD: 1 % (ref 0–2)
BASOPHILS ABSOLUTE: 0.08 K/UL (ref 0–0.2)
BUN BLDV-MCNC: 9 MG/DL (ref 8–23)
BUN/CREAT BLD: 15 (ref 9–20)
CALCIUM SERPL-MCNC: 9.7 MG/DL (ref 8.6–10.4)
CHLORIDE BLD-SCNC: 104 MMOL/L (ref 98–107)
CHOLESTEROL/HDL RATIO: 7.1
CHOLESTEROL: 242 MG/DL
CO2: 25 MMOL/L (ref 20–31)
CREAT SERPL-MCNC: 0.61 MG/DL (ref 0.5–0.9)
DIFFERENTIAL TYPE: ABNORMAL
EOSINOPHILS RELATIVE PERCENT: 4 % (ref 1–4)
GFR AFRICAN AMERICAN: >60 ML/MIN
GFR NON-AFRICAN AMERICAN: >60 ML/MIN
GFR SERPL CREATININE-BSD FRML MDRD: ABNORMAL ML/MIN/{1.73_M2}
GFR SERPL CREATININE-BSD FRML MDRD: ABNORMAL ML/MIN/{1.73_M2}
GLUCOSE BLD-MCNC: 114 MG/DL (ref 70–99)
HCT VFR BLD CALC: 44.2 % (ref 36.3–47.1)
HDLC SERPL-MCNC: 34 MG/DL
HEMOGLOBIN: 14.4 G/DL (ref 11.9–15.1)
IMMATURE GRANULOCYTES: 0 %
LDL CHOLESTEROL: 176 MG/DL (ref 0–130)
LYMPHOCYTES # BLD: 28 % (ref 24–43)
MCH RBC QN AUTO: 27.3 PG (ref 25.2–33.5)
MCHC RBC AUTO-ENTMCNC: 32.6 G/DL (ref 28.4–34.8)
MCV RBC AUTO: 83.9 FL (ref 82.6–102.9)
MONOCYTES # BLD: 9 % (ref 3–12)
MORPHOLOGY: ABNORMAL
NRBC AUTOMATED: 0 PER 100 WBC
PDW BLD-RTO: 15.3 % (ref 11.8–14.4)
PLATELET # BLD: 252 K/UL (ref 138–453)
PLATELET ESTIMATE: ABNORMAL
PMV BLD AUTO: 11.2 FL (ref 8.1–13.5)
POTASSIUM SERPL-SCNC: 4.1 MMOL/L (ref 3.7–5.3)
RBC # BLD: 5.27 M/UL (ref 3.95–5.11)
RBC # BLD: ABNORMAL 10*6/UL
SEG NEUTROPHILS: 58 % (ref 36–65)
SEGMENTED NEUTROPHILS ABSOLUTE COUNT: 4.64 K/UL (ref 1.5–8.1)
SODIUM BLD-SCNC: 140 MMOL/L (ref 135–144)
THYROXINE, FREE: 1.53 NG/DL (ref 0.93–1.7)
TRIGL SERPL-MCNC: 162 MG/DL
TSH SERPL DL<=0.05 MIU/L-ACNC: 0.12 MIU/L (ref 0.3–5)
VLDLC SERPL CALC-MCNC: ABNORMAL MG/DL (ref 1–30)
WBC # BLD: 8 K/UL (ref 3.5–11.3)
WBC # BLD: ABNORMAL 10*3/UL

## 2021-07-16 PROCEDURE — 80061 LIPID PANEL: CPT

## 2021-07-16 PROCEDURE — 84439 ASSAY OF FREE THYROXINE: CPT

## 2021-07-16 PROCEDURE — 84460 ALANINE AMINO (ALT) (SGPT): CPT

## 2021-07-16 PROCEDURE — 85025 COMPLETE CBC W/AUTO DIFF WBC: CPT

## 2021-07-16 PROCEDURE — 36415 COLL VENOUS BLD VENIPUNCTURE: CPT

## 2021-07-16 PROCEDURE — 84443 ASSAY THYROID STIM HORMONE: CPT

## 2021-07-16 PROCEDURE — 84450 TRANSFERASE (AST) (SGOT): CPT

## 2021-07-16 PROCEDURE — 80048 BASIC METABOLIC PNL TOTAL CA: CPT

## 2021-07-16 NOTE — TELEPHONE ENCOUNTER
Called and spoke with patient and informed her that she would need to take her order on the purple paper to her pharmacy and they will give her the shingles vaccine. To call office with any other questions or problems. Verbalizes understanding.

## 2021-07-16 NOTE — TELEPHONE ENCOUNTER
----- Message from Karincaitlin Meier sent at 7/16/2021 11:16 AM EDT -----  Subject: Message to Provider    QUESTIONS  Information for Provider? Pt called wanting to know where to go to get   shingles vaccine. [facility] PCP told her to go to [facility] but was told   they don't offer those when she arrived. Please contact to update.   ---------------------------------------------------------------------------  --------------  CALL BACK INFO  What is the best way for the office to contact you? OK to leave message on   voicemail  Preferred Call Back Phone Number? [phone]  ---------------------------------------------------------------------------  --------------  SCRIPT ANSWERS  Relationship to Patient?  Self

## 2021-07-28 NOTE — PROGRESS NOTES
Pt arrives to recovery after local sedation. Pt denies any C/o's or needs. Pt dresses self. Discharge instructions given. Pt denies any questions or concerns. Pt transfers self off unit w/ all belongings.
No

## 2021-08-31 DIAGNOSIS — E11.8 CONTROLLED TYPE 2 DIABETES MELLITUS WITH COMPLICATION, WITHOUT LONG-TERM CURRENT USE OF INSULIN (HCC): ICD-10-CM

## 2021-08-31 DIAGNOSIS — F43.0 STRESS REACTION: ICD-10-CM

## 2021-08-31 RX ORDER — ALBUTEROL SULFATE 90 UG/1
2 AEROSOL, METERED RESPIRATORY (INHALATION)
Qty: 1 INHALER | Refills: 0 | Status: SHIPPED | OUTPATIENT
Start: 2021-08-31

## 2021-08-31 RX ORDER — HYDROXYZINE HYDROCHLORIDE 25 MG/1
TABLET, FILM COATED ORAL
Qty: 270 TABLET | Refills: 0 | Status: SHIPPED
Start: 2021-08-31 | End: 2022-06-28

## 2021-08-31 NOTE — TELEPHONE ENCOUNTER
Health Maintenance   Topic Date Due    Shingles Vaccine (2 of 3) 09/02/2016    Pneumococcal 65+ years Vaccine (1 of 1 - PPSV23) 07/08/2021    Low dose CT lung screening  07/21/2021    DEXA (modify frequency per FRAX score)  01/11/2022 (Originally 1/2/2009)    COVID-19 Vaccine (1) 02/19/2022 (Originally 1/2/1967)    Diabetic retinal exam  07/08/2022 (Originally 11/6/2013)    Flu vaccine (1) 09/01/2021    Annual Wellness Visit (AWV)  11/19/2021    Colon Cancer Screen FIT/FOBT  12/28/2021    Diabetic microalbuminuria test  12/29/2021    A1C test (Diabetic or Prediabetic)  02/19/2022    Diabetic foot exam  06/28/2022    Lipid screen  07/16/2022    TSH testing  07/16/2022    Potassium monitoring  07/16/2022    Creatinine monitoring  07/16/2022    Breast cancer screen  03/19/2023    DTaP/Tdap/Td vaccine (2 - Td or Tdap) 06/28/2031    Hepatitis C screen  Completed    Hepatitis A vaccine  Aged Out    Hib vaccine  Aged Out    Meningococcal (ACWY) vaccine  Aged Out             (applicable per patient's age: Cancer Screenings, Depression Screening, Fall Risk Screening, Immunizations)    Hemoglobin A1C (%)   Date Value   02/19/2021 7.0 (H)   12/29/2020 7.2 (H)   04/15/2020 6.9 (H)     Microalb/Crt.  Ratio (mcg/mg creat)   Date Value   12/29/2020 CANNOT BE CALCULATED     LDL Cholesterol (mg/dL)   Date Value   07/16/2021 176 (H)     AST (U/L)   Date Value   07/16/2021 11     ALT (U/L)   Date Value   07/16/2021 14     BUN (mg/dL)   Date Value   07/16/2021 9      (goal A1C is < 7)   (goal LDL is <100) need 30-50% reduction from baseline     BP Readings from Last 3 Encounters:   06/28/21 118/72   06/08/21 (!) 122/52   04/13/21 (!) 136/57    (goal /80)      All Future Testing planned in [facility]:  Lab Frequency Next Occurrence       Next Visit Date:  Future Appointments   Date Time Provider Christopher Petersen   9/29/2021  2:40 PM Roselyn Judge MD [facility]   11/22/2021  2:00 PM CYRUS Rangel - CNP [facility]   12/28/2021  8:40 AM Cheryle Huh Might, APRN - CNP [facility]            Patient Active Problem List:     Dermatophytosis of foot     Diabetes type 2, controlled ([address]     Dyslipidemia     Hypothyroid     Obesity (BMI 30-39. 9)     Essential hypertension     Noncompliance with therapeutic plan     Tobacco abuse     Positive FIT (fecal immunochemical test)     Morbidly obese (HCC)     Hypoxia     Atrial fibrillation with RVR (HCC)     Aspiration pneumonia (HCC)     S/P arthroscopic partial medial meniscectomy -- right knee     Moderate mitral stenosis by prior echocardiogram     Moderate aortic stenosis by prior echocardiogram

## 2021-09-01 DIAGNOSIS — E11.8 CONTROLLED TYPE 2 DIABETES MELLITUS WITH COMPLICATION, WITHOUT LONG-TERM CURRENT USE OF INSULIN (HCC): ICD-10-CM

## 2021-09-01 RX ORDER — LANCETS 30 GAUGE
1 EACH MISCELLANEOUS DAILY
Qty: 100 EACH | Refills: 3 | Status: SHIPPED | OUTPATIENT
Start: 2021-09-01 | End: 2022-10-10

## 2021-09-01 RX ORDER — CALCIUM CITRATE/VITAMIN D3 200MG-6.25
1 TABLET ORAL DAILY
Qty: 100 EACH | Refills: 3 | Status: SHIPPED | OUTPATIENT
Start: 2021-09-01

## 2021-09-01 NOTE — TELEPHONE ENCOUNTER
Health Maintenance   Topic Date Due    Shingles Vaccine (2 of 3) 09/02/2016    Pneumococcal 65+ years Vaccine (1 of 1 - PPSV23) 07/08/2021    Low dose CT lung screening  07/21/2021    Flu vaccine (1) Never done    DEXA (modify frequency per FRAX score)  01/11/2022 (Originally 1/2/2009)    COVID-19 Vaccine (1) 02/19/2022 (Originally 1/2/1967)    Diabetic retinal exam  07/08/2022 (Originally 11/6/2013)    Annual Wellness Visit (AWV)  11/19/2021    Colon Cancer Screen FIT/FOBT  12/28/2021    Diabetic microalbuminuria test  12/29/2021    A1C test (Diabetic or Prediabetic)  02/19/2022    Diabetic foot exam  06/28/2022    Lipid screen  07/16/2022    TSH testing  07/16/2022    Potassium monitoring  07/16/2022    Creatinine monitoring  07/16/2022    Breast cancer screen  03/19/2023    DTaP/Tdap/Td vaccine (2 - Td or Tdap) 06/28/2031    Hepatitis C screen  Completed    Hepatitis A vaccine  Aged Out    Hib vaccine  Aged Out    Meningococcal (ACWY) vaccine  Aged Out             (applicable per patient's age: Cancer Screenings, Depression Screening, Fall Risk Screening, Immunizations)    Hemoglobin A1C (%)   Date Value   02/19/2021 7.0 (H)   12/29/2020 7.2 (H)   04/15/2020 6.9 (H)     Microalb/Crt.  Ratio (mcg/mg creat)   Date Value   12/29/2020 CANNOT BE CALCULATED     LDL Cholesterol (mg/dL)   Date Value   07/16/2021 176 (H)     AST (U/L)   Date Value   07/16/2021 11     ALT (U/L)   Date Value   07/16/2021 14     BUN (mg/dL)   Date Value   07/16/2021 9      (goal A1C is < 7)   (goal LDL is <100) need 30-50% reduction from baseline     BP Readings from Last 3 Encounters:   06/28/21 118/72   06/08/21 (!) 122/52   04/13/21 (!) 136/57    (goal /80)      All Future Testing planned in [facility]:  Lab Frequency Next Occurrence       Next Visit Date:  Future Appointments   Date Time Provider Christopher Petersen   9/29/2021  2:40 PM Nathaniel Rodriguez MD [facility]   11/22/2021  2:00 PM CYRUS Sanchez - CNP [facility]   12/28/2021  8:40 AM CYRUS Tate - CNP [facility]            Patient Active Problem List:     Dermatophytosis of foot     Diabetes type 2, controlled ([facility] [address]     Dyslipidemia     Hypothyroid     Obesity (BMI 30-39. 9)     Essential hypertension     Noncompliance with therapeutic plan     Tobacco abuse     Positive FIT (fecal immunochemical test)     Morbidly obese (HCC)     Hypoxia     Atrial fibrillation with RVR (HCC)     Aspiration pneumonia (HCC)     S/P arthroscopic partial medial meniscectomy -- right knee     Moderate mitral stenosis by prior echocardiogram     Moderate aortic stenosis by prior echocardiogram

## 2021-09-08 NOTE — TELEPHONE ENCOUNTER
Health Maintenance   Topic Date Due    Diabetic retinal exam  06/25/2021 (Originally 11/6/2013)    DTaP/Tdap/Td vaccine (1 - Tdap) 06/25/2021 (Originally 1/2/1973)    Shingles Vaccine (2 of 3) 06/25/2021 (Originally 9/2/2016)    Flu vaccine (Season Ended) 10/01/2021 (Originally 9/1/2021)    DEXA (modify frequency per FRAX score)  01/11/2022 (Originally 1/2/2009)    COVID-19 Vaccine (1) 02/19/2022 (Originally 1/2/1967)    Diabetic foot exam  06/25/2021    Pneumococcal 65+ years Vaccine (1 of 1 - PPSV23) 07/08/2021    Low dose CT lung screening  07/21/2021    Annual Wellness Visit (AWV)  11/19/2021    Colon Cancer Screen FIT/FOBT  12/28/2021    Diabetic microalbuminuria test  12/29/2021    Lipid screen  12/29/2021    TSH testing  12/29/2021    A1C test (Diabetic or Prediabetic)  02/19/2022    Potassium monitoring  03/04/2022    Creatinine monitoring  03/04/2022    Breast cancer screen  03/19/2023    Hepatitis C screen  Completed    Hepatitis A vaccine  Aged Out    Hib vaccine  Aged Out    Meningococcal (ACWY) vaccine  Aged Out             (applicable per patient's age: Cancer Screenings, Depression Screening, Fall Risk Screening, Immunizations)    Hemoglobin A1C (%)   Date Value   02/19/2021 7.0 (H)   12/29/2020 7.2 (H)   04/15/2020 6.9 (H)     Microalb/Crt.  Ratio (mcg/mg creat)   Date Value   12/29/2020 CANNOT BE CALCULATED     LDL Cholesterol (mg/dL)   Date Value   12/29/2020 159 (H)     AST (U/L)   Date Value   03/02/2021 12     ALT (U/L)   Date Value   03/02/2021 11     BUN (mg/dL)   Date Value   03/04/2021 8      (goal A1C is < 7)   (goal LDL is <100) need 30-50% reduction from baseline     BP Readings from Last 3 Encounters:   04/13/21 (!) 136/57   04/06/21 (!) 141/61   03/17/21 109/65    (goal /80)      All Future Testing planned in [facility]:  Lab Frequency Next Occurrence   Initiate PAT Protocol Once 04/19/2021   Initiate PAT Protocol Once 04/26/2021       Next Visit Date:  Future Appointments   Date Time Provider Christopher Villareali   6/28/2021  9:00 AM [facility]YRUS Baum - [facility]   9/29/2021  2:40 PM [facility]   11/22/2021  2:00 PM CYRUS Baum - CNP [facility]            Patient Active Problem List:     Dermatophytosis of foot     Diabetes type 2, controlled ([address]     Dyslipidemia     Hypothyroid     Obesity (BMI 30-39. 9)     Essential hypertension     Noncompliance with therapeutic plan     Tobacco abuse     Positive FIT (fecal immunochemical test)     Morbidly obese (HCC)     Hypoxia     Atrial fibrillation with RVR (HCC)     Aspiration pneumonia (HCC)     S/P arthroscopic partial medial meniscectomy -- right knee     Moderate mitral stenosis by prior echocardiogram     Moderate aortic stenosis by prior echocardiogram How Severe Is Your Rash?: moderate Is This A New Presentation, Or A Follow-Up?: Rash

## 2021-10-25 DIAGNOSIS — G89.29 CHRONIC BILATERAL LOW BACK PAIN WITH LEFT-SIDED SCIATICA: ICD-10-CM

## 2021-10-25 DIAGNOSIS — M54.42 CHRONIC BILATERAL LOW BACK PAIN WITH LEFT-SIDED SCIATICA: ICD-10-CM

## 2021-10-25 DIAGNOSIS — M54.2 CHRONIC NECK PAIN: ICD-10-CM

## 2021-10-25 DIAGNOSIS — G89.29 CHRONIC NECK PAIN: ICD-10-CM

## 2021-10-25 RX ORDER — GABAPENTIN 300 MG/1
CAPSULE ORAL
Qty: 90 CAPSULE | Refills: 2 | Status: SHIPPED | OUTPATIENT
Start: 2021-10-25 | End: 2021-12-28 | Stop reason: SDUPTHER

## 2021-10-25 NOTE — TELEPHONE ENCOUNTER
Health Maintenance   Topic Date Due    Shingles Vaccine (2 of 3) 09/02/2016    Pneumococcal 65+ years Vaccine (1 of 1 - PPSV23) 07/08/2021    Low dose CT lung screening  07/21/2021    Flu vaccine (1) Never done   [employer] Visit (AWV)  11/19/2021    DEXA (modify frequency per FRAX score)  01/11/2022 (Originally 1/2/2009)    COVID-19 Vaccine (1) 02/19/2022 (Originally 1/2/1967)    Diabetic retinal exam  07/08/2022 (Originally 11/6/2013)    Colon Cancer Screen FIT/FOBT  12/28/2021    Diabetic microalbuminuria test  12/29/2021    A1C test (Diabetic or Prediabetic)  02/19/2022    Diabetic foot exam  06/28/2022    Lipid screen  07/16/2022    TSH testing  07/16/2022    Potassium monitoring  07/16/2022    Creatinine monitoring  07/16/2022    Breast cancer screen  03/19/2023    DTaP/Tdap/Td vaccine (2 - Td or Tdap) 06/28/2031    Hepatitis C screen  Completed    Hepatitis A vaccine  Aged Out    Hib vaccine  Aged Out    Meningococcal (ACWY) vaccine  Aged Out             (applicable per patient's age: Cancer Screenings, Depression Screening, Fall Risk Screening, Immunizations)    Hemoglobin A1C (%)   Date Value   02/19/2021 7.0 (H)   12/29/2020 7.2 (H)   04/15/2020 6.9 (H)     Microalb/Crt.  Ratio (mcg/mg creat)   Date Value   12/29/2020 CANNOT BE CALCULATED     LDL Cholesterol (mg/dL)   Date Value   07/16/2021 176 (H)     AST (U/L)   Date Value   07/16/2021 11     ALT (U/L)   Date Value   07/16/2021 14     BUN (mg/dL)   Date Value   07/16/2021 9      (goal A1C is < 7)   (goal LDL is <100) need 30-50% reduction from baseline     BP Readings from Last 3 Encounters:   06/28/21 118/72   06/08/21 (!) 122/52   04/13/21 (!) 136/57    (goal /80)      All Future Testing planned in [facility]:  Lab Frequency Next Occurrence       Next Visit Date:  Future Appointments   Date Time Provider Christopher Petersen   11/10/2021  1:00 PM Candis Mei MD [facility]   11/22/2021  2:00 PM CYRUS Cannon - CNP [place] [facility]   12/28/2021  8:40 AM CYRUS Partida - CNP [facility]            Patient Active Problem List:     Dermatophytosis of foot     Diabetes type 2, controlled ([facility] [address]     Dyslipidemia     Hypothyroid     Obesity (BMI 30-39. 9)     Essential hypertension     Noncompliance with therapeutic plan     Tobacco abuse     Positive FIT (fecal immunochemical test)     Morbidly obese (HCC)     Hypoxia     Atrial fibrillation with RVR (HCC)     Aspiration pneumonia (HCC)     S/P arthroscopic partial medial meniscectomy -- right knee     Moderate mitral stenosis by prior echocardiogram     Moderate aortic stenosis by prior echocardiogram

## 2021-12-28 ENCOUNTER — OFFICE VISIT (OUTPATIENT)
Dept: PRIMARY CARE CLINIC | Age: 66
End: 2021-12-28
Payer: MEDICARE

## 2021-12-28 VITALS
RESPIRATION RATE: 18 BRPM | SYSTOLIC BLOOD PRESSURE: 110 MMHG | HEIGHT: 64 IN | OXYGEN SATURATION: 95 % | DIASTOLIC BLOOD PRESSURE: 64 MMHG | WEIGHT: 211 LBS | TEMPERATURE: 98.2 F | BODY MASS INDEX: 36.02 KG/M2 | HEART RATE: 67 BPM

## 2021-12-28 DIAGNOSIS — E03.1 CONGENITAL HYPOTHYROIDISM WITHOUT GOITER: ICD-10-CM

## 2021-12-28 DIAGNOSIS — F17.200 SMOKER: ICD-10-CM

## 2021-12-28 DIAGNOSIS — M54.42 CHRONIC BILATERAL LOW BACK PAIN WITH LEFT-SIDED SCIATICA: ICD-10-CM

## 2021-12-28 DIAGNOSIS — E11.9 CONTROLLED TYPE 2 DIABETES MELLITUS WITHOUT COMPLICATION, WITHOUT LONG-TERM CURRENT USE OF INSULIN (HCC): Primary | ICD-10-CM

## 2021-12-28 DIAGNOSIS — Z87.891 HISTORY OF TOBACCO USE: Primary | ICD-10-CM

## 2021-12-28 DIAGNOSIS — G89.29 CHRONIC NECK PAIN: ICD-10-CM

## 2021-12-28 DIAGNOSIS — G89.29 CHRONIC BILATERAL LOW BACK PAIN WITH LEFT-SIDED SCIATICA: ICD-10-CM

## 2021-12-28 DIAGNOSIS — R07.9 LEFT-SIDED CHEST PAIN: ICD-10-CM

## 2021-12-28 DIAGNOSIS — M54.2 CHRONIC NECK PAIN: ICD-10-CM

## 2021-12-28 DIAGNOSIS — E78.5 DYSLIPIDEMIA: ICD-10-CM

## 2021-12-28 LAB — HBA1C MFR BLD: 6.3 %

## 2021-12-28 PROCEDURE — 3017F COLORECTAL CA SCREEN DOC REV: CPT | Performed by: NURSE PRACTITIONER

## 2021-12-28 PROCEDURE — 99214 OFFICE O/P EST MOD 30 MIN: CPT | Performed by: NURSE PRACTITIONER

## 2021-12-28 PROCEDURE — G8484 FLU IMMUNIZE NO ADMIN: HCPCS | Performed by: NURSE PRACTITIONER

## 2021-12-28 PROCEDURE — G8417 CALC BMI ABV UP PARAM F/U: HCPCS | Performed by: NURSE PRACTITIONER

## 2021-12-28 PROCEDURE — 4004F PT TOBACCO SCREEN RCVD TLK: CPT | Performed by: NURSE PRACTITIONER

## 2021-12-28 PROCEDURE — 2022F DILAT RTA XM EVC RTNOPTHY: CPT | Performed by: NURSE PRACTITIONER

## 2021-12-28 PROCEDURE — 83036 HEMOGLOBIN GLYCOSYLATED A1C: CPT | Performed by: NURSE PRACTITIONER

## 2021-12-28 PROCEDURE — 1090F PRES/ABSN URINE INCON ASSESS: CPT | Performed by: NURSE PRACTITIONER

## 2021-12-28 PROCEDURE — 4040F PNEUMOC VAC/ADMIN/RCVD: CPT | Performed by: NURSE PRACTITIONER

## 2021-12-28 PROCEDURE — 1123F ACP DISCUSS/DSCN MKR DOCD: CPT | Performed by: NURSE PRACTITIONER

## 2021-12-28 PROCEDURE — G8400 PT W/DXA NO RESULTS DOC: HCPCS | Performed by: NURSE PRACTITIONER

## 2021-12-28 PROCEDURE — G8427 DOCREV CUR MEDS BY ELIG CLIN: HCPCS | Performed by: NURSE PRACTITIONER

## 2021-12-28 RX ORDER — METFORMIN HYDROCHLORIDE 500 MG/1
1000 TABLET, EXTENDED RELEASE ORAL
Qty: 180 TABLET | Refills: 3 | Status: SHIPPED | OUTPATIENT
Start: 2021-12-28

## 2021-12-28 RX ORDER — ATORVASTATIN CALCIUM 40 MG/1
TABLET, FILM COATED ORAL
Qty: 90 TABLET | Refills: 3 | Status: SHIPPED | OUTPATIENT
Start: 2021-12-28 | End: 2022-06-28 | Stop reason: SDUPTHER

## 2021-12-28 RX ORDER — VENLAFAXINE HYDROCHLORIDE 37.5 MG/1
37.5 CAPSULE, EXTENDED RELEASE ORAL DAILY
Qty: 90 CAPSULE | Refills: 3 | Status: SHIPPED | OUTPATIENT
Start: 2021-12-28 | End: 2022-06-28 | Stop reason: SDUPTHER

## 2021-12-28 RX ORDER — GABAPENTIN 300 MG/1
CAPSULE ORAL
Qty: 90 CAPSULE | Refills: 2 | Status: SHIPPED | OUTPATIENT
Start: 2021-12-28 | End: 2022-07-15

## 2021-12-28 RX ORDER — LISINOPRIL 2.5 MG/1
TABLET ORAL
Qty: 90 TABLET | Refills: 3 | Status: SHIPPED | OUTPATIENT
Start: 2021-12-28

## 2021-12-28 RX ORDER — LEVOTHYROXINE SODIUM 175 UG/1
TABLET ORAL
Qty: 90 TABLET | Refills: 3 | Status: SHIPPED | OUTPATIENT
Start: 2021-12-28

## 2021-12-28 ASSESSMENT — ENCOUNTER SYMPTOMS
CONSTIPATION: 0
WHEEZING: 0
VISUAL CHANGE: 0
TROUBLE SWALLOWING: 0
PHOTOPHOBIA: 0
SORE THROAT: 0
VOMITING: 0
BACK PAIN: 1
ABDOMINAL PAIN: 0
COUGH: 0
SHORTNESS OF BREATH: 0
NAUSEA: 0
BOWEL INCONTINENCE: 0
RHINORRHEA: 0
DIARRHEA: 0

## 2021-12-28 NOTE — PROGRESS NOTES
Name: Elizabeth Cordero  : 1955         Chief Complaint:     Chief Complaint   Patient presents with    Diabetes     pt states left side chest pain but breathing fine     Hypothyroidism       History of Present Illness:      Elizabeth Natarajanr is a 78 y.o.  female who presents with Diabetes (pt states left side chest pain but breathing fine ) and Hypothyroidism      Gurpreet Little is here today for a routine office visit. Hypothyroidism-stable, denies having any fatigue, excessive daytime sleepiness, insomnia, agitation, or palpitations. She is taking her medication as directed. Chronic neck and back pain-continues with worsening sciatica, is seeing pain management and having injections with some limited relief. Is asking if we can increase dose of gabapentin. UPDATE -controlled with use of gabapentin. She does continue to follow with pain management. Left-sided chest pain-for the last 2 or 3 months. Patient states this is not exacerbated by movement or palpation. She is not short of breath and the pain does not radiate. Diabetes  She presents for her follow-up diabetic visit. She has type 2 diabetes mellitus. Her disease course has been stable. Pertinent negatives for hypoglycemia include no dizziness or headaches. Pertinent negatives for diabetes include no chest pain, no fatigue, no polydipsia, no polyphagia, no polyuria, no visual change, no weakness and no weight loss. There are no hypoglycemic complications. Pertinent negatives for diabetic complications include no CVA, heart disease, nephropathy or peripheral neuropathy. Risk factors for coronary artery disease include hypertension, stress, sedentary lifestyle, post-menopausal, obesity, family history, dyslipidemia and diabetes mellitus. Current diabetic treatment includes oral agent (dual therapy). She is compliant with treatment most of the time. Her weight is stable. She is following a generally healthy diet.  Meal planning includes avoidance of concentrated sweets. She has not had a previous visit with a dietitian. She never participates in exercise. There is no change in her home blood glucose trend. Her breakfast blood glucose range is generally 140-180 mg/dl. An ACE inhibitor/angiotensin II receptor blocker is being taken. She does not see a podiatrist.Eye exam is current. Back Pain  This is a recurrent problem. The current episode started more than 1 month ago. The problem occurs daily. The problem has been gradually worsening since onset. The pain is present in the lumbar spine. The quality of the pain is described as aching, burning, cramping, shooting and stabbing. The pain radiates to the left thigh. The pain is at a severity of 6/10. The pain is moderate. The pain is worse during the day. The symptoms are aggravated by standing and position. Stiffness is present at night. Associated symptoms include numbness. Pertinent negatives include no abdominal pain, bladder incontinence, bowel incontinence, chest pain, dysuria, fever, headaches, leg pain, paresis, paresthesias, pelvic pain, perianal numbness, tingling, weakness or weight loss. Risk factors include lack of exercise, menopause, obesity and sedentary lifestyle. She has tried heat, ice, muscle relaxant, NSAIDs and analgesics for the symptoms. The treatment provided mild relief. Neck Pain   This is a recurrent problem. The current episode started more than 1 month ago. The problem occurs daily. The problem has been unchanged. The pain is associated with an unknown factor. The pain is present in the midline. The quality of the pain is described as aching. The pain is at a severity of 4/10. The pain is mild. The symptoms are aggravated by position. The pain is worse during the day. Stiffness is present at night. Associated symptoms include numbness.  Pertinent negatives include no chest pain, fever, headaches, leg pain, pain with swallowing, paresis, photophobia, syncope, tingling, trouble swallowing, visual change, weakness or weight loss. She has tried acetaminophen, ice, NSAIDs and heat for the symptoms. The treatment provided mild relief. Hyperlipidemia  This is a chronic problem. The current episode started more than 1 year ago. The problem is controlled. Recent lipid tests were reviewed and are normal. Exacerbating diseases include diabetes, hypothyroidism and obesity. She has no history of liver disease or nephrotic syndrome. Factors aggravating her hyperlipidemia include smoking. Pertinent negatives include no chest pain, leg pain, myalgias or shortness of breath. Current antihyperlipidemic treatment includes statins. The current treatment provides moderate improvement of lipids. Compliance problems include adherence to exercise and adherence to diet. Risk factors for coronary artery disease include diabetes mellitus, dyslipidemia, family history, obesity, post-menopausal, a sedentary lifestyle and stress.          Past Medical History:     Past Medical History:   Diagnosis Date    COPD [facility]ch[facility] obstructive [facility] disease) ([facility] [address]     Hyperlipidemia     Hyperthyroidism     Hypothyroidism     Obesity     Osteoarthritis     Sleep apnea     Tobacco abuse     Type II or unspecified type diabetes mellitus without mention of complication, not stated as uncontrolled       Reviewed all health maintenance requirements and ordered appropriate tests  Health Maintenance Due   Topic Date Due    Low dose CT lung screening  07/21/2021    Annual Wellness Visit (AWV)  11/19/2021    Colon Cancer Screen FIT/FOBT  12/28/2021    Diabetic microalbuminuria test  12/29/2021       Past Surgical History:     Past Surgical History:   Procedure Laterality Date    FACET JOINT INJECTION Left 4/13/2021    CERVICAL FACET JOINT-C3-C4, C4-C5,C5-C6 [facility].      right heel    KNEE ARTHROSCOPY Right 03/02/2021    partial medial meniscetomy    KNEE bowel incontinence, constipation, diarrhea, nausea and vomiting. Endocrine: Negative for polydipsia, polyphagia and polyuria. Genitourinary: Negative for bladder incontinence, difficulty urinating, dysuria and pelvic pain. Musculoskeletal: Positive for arthralgias, back pain, gait problem, neck pain and neck stiffness. Negative for joint swelling and myalgias. Skin: Negative for rash. Neurological: Positive for numbness. Negative for dizziness, tingling, syncope, weakness, light-headedness, headaches and paresthesias. Physical Exam:   Vitals:  /64 (Position: Sitting)   Pulse 67   Temp 98.2 °F (36.8 °C) (Temporal)   Resp 18   Ht 5' 4\" (1.626 m)   Wt 211 lb (95.7 kg)   SpO2 95%   BMI 36.22 kg/m²     Physical Exam  Vitals and nursing note reviewed. Constitutional:       General: She is not in acute distress. Appearance: Normal appearance. She is obese. She is not ill-appearing. HENT:      Mouth/Throat:      Mouth: Mucous membranes are moist.   Eyes:      General: No scleral icterus. Conjunctiva/sclera: Conjunctivae normal.   Cardiovascular:      Rate and Rhythm: Normal rate and regular rhythm. Heart sounds: No murmur heard. Pulmonary:      Effort: Pulmonary effort is normal.      Breath sounds: Normal breath sounds. No wheezing. Abdominal:      General: Bowel sounds are normal. There is no distension. Palpations: Abdomen is soft. Tenderness: There is no abdominal tenderness. Musculoskeletal:      Cervical back: Neck supple. Tenderness present. Muscular tenderness present. No spinous process tenderness. Decreased range of motion. Lumbar back: Tenderness present. No spasms. Decreased range of motion. Back:       Right lower leg: No edema. Left lower leg: No edema. Skin:     General: Skin is warm and dry. Findings: No rash. Neurological:      Mental Status: She is alert and oriented to person, place, and time.    Psychiatric: Mood and Affect: Mood normal.         Behavior: Behavior normal.         Data:     Lab Results   Component Value Date     07/16/2021    K 4.1 07/16/2021     07/16/2021    CO2 25 07/16/2021    BUN 9 07/16/2021    CREATININE 0.61 07/16/2021    GLUCOSE 114 07/16/2021    GLUCOSE 103 05/24/2012    PROT 7.0 03/02/2021    LABALBU 3.1 03/02/2021    LABALBU 4.5 10/27/2011    BILITOT 0.27 03/02/2021    ALKPHOS 125 03/02/2021    AST 11 07/16/2021    ALT 14 07/16/2021     Lab Results   Component Value Date    WBC 8.0 07/16/2021    RBC 5.27 07/16/2021    RBC 4.83 05/24/2012    HGB 14.4 07/16/2021    HCT 44.2 07/16/2021    MCV 83.9 07/16/2021    MCH 27.3 07/16/2021    MCHC 32.6 07/16/2021    RDW 15.3 07/16/2021     07/16/2021     05/24/2012    MPV 11.2 07/16/2021     Lab Results   Component Value Date    TSH 0.12 07/16/2021     Lab Results   Component Value Date    CHOL 242 07/16/2021    HDL 34 07/16/2021    LABA1C 6.3 12/28/2021       Assessment/Plan:      Diagnosis Orders   1. Controlled type 2 diabetes mellitus without complication, without long-term current use of insulin (HCC)  POCT glycosylated hemoglobin (Hb A1C)    lisinopril (PRINIVIL;ZESTRIL) 2.5 MG tablet    metFORMIN (GLUCOPHAGE-XR) 500 MG extended release tablet    Basic Metabolic Panel    CBC Auto Differential    ALT    AST    Hemoglobin A1C    Lipid Panel    Microalbumin, Ur   2. Dyslipidemia  atorvastatin (LIPITOR) 40 MG tablet   3. Congenital hypothyroidism without goiter  levothyroxine (SYNTHROID) 175 MCG tablet    T4, Free    TSH without Reflex   4. Chronic bilateral low back pain with left-sided sciatica  gabapentin (NEURONTIN) 300 MG capsule   5. Chronic neck pain  gabapentin (NEURONTIN) 300 MG capsule   6. Smoker  CT lung screen [Initial/Annual]   7. Left-sided chest pain       Continue current medications. A1c is stable at 6.3.     Appointment made with cardiology for tomorrow regarding left anterior chest pain present for 2 months. She is to go to the emergency room in the interim if any worsening. We will see her back in 6 months with labs, sooner if any problems. 1.  Cyndy received counseling on the following healthy behaviors: nutrition, exercise and medication adherence  2. Patient given educational materials - see patient instructions  3. Was a self-tracking handout given in paper form or via [employer]? No  If yes, see orders or list here. 4.  Discussed use, benefit, and side effects of prescribed medications. Barriers to medication compliance addressed. All patient questions answered. Pt voiced understanding. 5.  Reviewed prior labs and health maintenance  6. Continue current medications, diet and exercise. Completed Refills   Requested Prescriptions     Signed Prescriptions Disp Refills    atorvastatin (LIPITOR) 40 MG tablet 90 tablet 3     Sig: TAKE ONE TABLET BY MOUTH DAILY    gabapentin (NEURONTIN) 300 MG capsule 90 capsule 2     Sig: TAKE ONE CAPSULE BY MOUTH THREE TIMES A DAY    levothyroxine (SYNTHROID) 175 MCG tablet 90 tablet 3     Sig: TAKE ONE TABLET BY MOUTH DAILY    lisinopril (PRINIVIL;ZESTRIL) 2.5 MG tablet 90 tablet 3     Sig: TAKE ONE TABLET BY MOUTH DAILY    metFORMIN (GLUCOPHAGE-XR) 500 MG extended release tablet 180 tablet 3     Sig: Take 2 tablets by mouth daily (with breakfast)    venlafaxine (EFFEXOR XR) 37.5 MG extended release capsule 90 capsule 3     Sig: Take 1 capsule by mouth daily         Return in about 6 months (around 6/28/2022) for Check up.

## 2021-12-29 ENCOUNTER — OFFICE VISIT (OUTPATIENT)
Dept: CARDIOLOGY | Age: 66
End: 2021-12-29
Payer: MEDICARE

## 2021-12-29 VITALS
HEIGHT: 65 IN | WEIGHT: 211 LBS | HEART RATE: 51 BPM | BODY MASS INDEX: 35.16 KG/M2 | DIASTOLIC BLOOD PRESSURE: 64 MMHG | SYSTOLIC BLOOD PRESSURE: 110 MMHG | RESPIRATION RATE: 20 BRPM | OXYGEN SATURATION: 94 %

## 2021-12-29 DIAGNOSIS — I35.0 MODERATE AORTIC STENOSIS BY PRIOR ECHOCARDIOGRAM: ICD-10-CM

## 2021-12-29 DIAGNOSIS — I10 ESSENTIAL HYPERTENSION: ICD-10-CM

## 2021-12-29 DIAGNOSIS — I05.0 MODERATE MITRAL STENOSIS BY PRIOR ECHOCARDIOGRAM: ICD-10-CM

## 2021-12-29 DIAGNOSIS — Z71.6 TOBACCO ABUSE COUNSELING: ICD-10-CM

## 2021-12-29 DIAGNOSIS — R06.02 SOB (SHORTNESS OF BREATH): ICD-10-CM

## 2021-12-29 DIAGNOSIS — R07.89 ATYPICAL CHEST PAIN: Primary | ICD-10-CM

## 2021-12-29 DIAGNOSIS — I48.0 PAF (PAROXYSMAL ATRIAL FIBRILLATION) (HCC): ICD-10-CM

## 2021-12-29 PROCEDURE — G8484 FLU IMMUNIZE NO ADMIN: HCPCS | Performed by: PHYSICIAN ASSISTANT

## 2021-12-29 PROCEDURE — 1123F ACP DISCUSS/DSCN MKR DOCD: CPT | Performed by: PHYSICIAN ASSISTANT

## 2021-12-29 PROCEDURE — 1090F PRES/ABSN URINE INCON ASSESS: CPT | Performed by: PHYSICIAN ASSISTANT

## 2021-12-29 PROCEDURE — G8417 CALC BMI ABV UP PARAM F/U: HCPCS | Performed by: PHYSICIAN ASSISTANT

## 2021-12-29 PROCEDURE — 93000 ELECTROCARDIOGRAM COMPLETE: CPT | Performed by: PHYSICIAN ASSISTANT

## 2021-12-29 PROCEDURE — 99214 OFFICE O/P EST MOD 30 MIN: CPT | Performed by: PHYSICIAN ASSISTANT

## 2021-12-29 PROCEDURE — G8427 DOCREV CUR MEDS BY ELIG CLIN: HCPCS | Performed by: PHYSICIAN ASSISTANT

## 2021-12-29 PROCEDURE — 4040F PNEUMOC VAC/ADMIN/RCVD: CPT | Performed by: PHYSICIAN ASSISTANT

## 2021-12-29 PROCEDURE — 3017F COLORECTAL CA SCREEN DOC REV: CPT | Performed by: PHYSICIAN ASSISTANT

## 2021-12-29 PROCEDURE — 4004F PT TOBACCO SCREEN RCVD TLK: CPT | Performed by: PHYSICIAN ASSISTANT

## 2021-12-29 PROCEDURE — G8400 PT W/DXA NO RESULTS DOC: HCPCS | Performed by: PHYSICIAN ASSISTANT

## 2021-12-29 NOTE — PROGRESS NOTES
[facility] am scribing for and in the presence of Xuan Hope     Patient: Willow Samayoa  : 1955   Primary Care Physician: Piotr Johnson  Today's Date: 2021    Dear Briana Miranda, APRN - CNP,     HPI:  Ms. Roxy Melendrez is a 78 y.o. female who presents today to the office for chest pain that has been bothering her over the past couple of weeks. She previously had been electively admitted to the hospital for atrial fibrillation on 3/4/2021. She has been told in the past that she has a heart murmur. Family cardiac history includes her brother who had a heart attack in his 42's and he has stents. She is a smoker, starting at age 15 at one pack per day over the past 48 years. She has a past medical history significant for hypertension, hyperlipidemia, COPD, ANGE, and type 2 diabetes but denies any other cardiac history. Echocardiogram done on 3/2/2021 showed moderate mitral stenosis and moderate aortic stenosis. Cardiac event monitor done on 3/4/2021: Predominant rhythm: Normal sinus rhythm. Atrial tachycardia (AT) 6 episodes. Longest: 26 beats at average 108 bpm up to 135 bpm. Fastest: 18 beats at average 145 bpm up to 178 bpm. Premature atrial contractions 0.32%. Symptoms were reported but were associated with normal sinus rhythm. Clinical correlation required. Ms. Roxy Melendrez is here today due to complains of chest discomfort. Her discomfort started about two to three weeks ago. She described her chest discomfort as an ache in her chest that lasts several seconds to a minute or so at a time. She can get some arm pain with these episodes. She also can get shortness of breath which she feels has gotten worse over the last week and a half. No nausea or vomiting. No abdominal pain or stomach pain. She has lost some weight however she is not trying at this time.  Her  has passed away last year and she just has not cooked for herself they way she used to cook for him or her family. She does think she drinks plenty of fluids. She drinks about 2-3 cups a day of coffee. She denies any lightheadedness or dizziness. She denies any leg swelling. No fever or cough. She denied any abdominal pain, bleeding problems, problems with her medications or any other concerns at this time. The 10-year ASCVD risk score (Bonnie Dixon, et al., 2013) is: 26.9%    Values used to calculate the score:      Age: 78 years      Sex: Female      Is Non- : No      Diabetic: Yes      Tobacco smoker: Yes      Systolic Blood Pressure: 334 mmHg      Is BP treated: Yes      HDL Cholesterol: 34 mg/dL      Total Cholesterol: 242 mg/dL    Bleeding Risks: Ms. Antonio Kang denies any current or recent bleeding problems including a history of a GI bleed, ulcers, recent or upcoming surgeries, blood in her stool or black tarry stools or blood in her urine. Past Medical History:   Diagnosis Date    [facility]ch[facility] disease) ([facility] [address]     Hyperlipidemia     Hyperthyroidism     Hypothyroidism     Obesity     Osteoarthritis     Sleep apnea     Tobacco abuse     Type II or unspecified type diabetes mellitus without mention of complication, not stated as uncontrolled        CURRENT ALLERGIES: Patient has no known allergies. REVIEW OF SYSTEMS: 14 systems were reviewed. Pertinent positives and negatives as above, all else negative.      Past Surgical History:   Procedure Laterality Date    FACET JOINT INJECTION Left 4/13/2021    CERVICAL FACET JOINT-C3-C4, C4-C5,C5-C6 performed by Wero Singleton MD at [place] [address].      right heel    KNEE ARTHROSCOPY Right 03/02/2021    partial medial meniscetomy    KNEE ARTHROSCOPY Right 3/2/2021    KNEE ARTHROSCOPY-WITH MEDIAL AND LATERAL partial MENISCECTOMY performed by Dottie Engel MD at [address] [place] [address] N/A 4/6/2021    EPIDURAL STEROID INJECTION-LUMBAR L3-4 performed by Wero Singleton MD at [facility] BY MOUTH TWICE A DAY 90 tablet 3    aspirin  MG EC tablet Take 1 tablet by mouth daily      tiZANidine (ZANAFLEX) 4 MG tablet Take 1 tablet by mouth every 8 hours as needed (Back pain) 15 tablet 0    acetaminophen (TYLENOL) 325 MG tablet Take 2 tablets by mouth every 8 hours as needed for Pain 30 tablet 0    Elastic Bandages & Supports (KNEE BRACE/HINGED BARS MEDIUM) MISC 1 each by Does not apply route daily 1 each 0       FAMILY HISTORY: family history includes Cancer in her father; Diabetes in her brother, paternal grandmother, and sister. PHYSICAL EXAM:   /64 (Site: Left Upper Arm, Position: Sitting, Cuff Size: Large Adult)   Pulse 51   Resp 20   Ht 5' 5\" (1.651 m)   Wt 211 lb (95.7 kg)   SpO2 94%   BMI 35.11 kg/m²  Body mass index is 35.11 kg/m². Constitutional: She is oriented to person, place, and time. She appears well-developed and well-nourished. In no acute distress. HEENT: Normocephalic and atraumatic. No JVD present. Carotid bruit is not present. No mass and no thyromegaly present. No lymphadenopathy present. Cardiovascular: Normal rate, regular rhythm, normal heart sounds. Exam reveals no gallop and no friction rubs. 3/6 systolic murmur, 2nd intercostal space on the RIGHT just lateral to the sternum. Pulmonary/Chest: Effort normal and breath sounds normal. No respiratory distress. She has no wheezes, rhonchi or rales. Abdominal: Soft, non-tender. Bowel sounds and aorta are normal. She exhibits no organomegaly, mass or bruit. Extremities: Trace. No cyanosis or clubbing. 2+ radial and carotid pulses. Distal extremity pulses: 2+ bilaterally. Neurological: She is alert and oriented to person, place, and time. No evidence of gross cranial nerve deficit. Coordination appeared normal.   Skin: Skin is warm and dry. There is no rash or diaphoresis. Psychiatric: She has a normal mood and affect.  Her speech is normal and behavior is normal.      MOST RECENT LABS ON RECORD: Lab Results   Component Value Date    WBC 8.0 07/16/2021    HGB 14.4 07/16/2021    HCT 44.2 07/16/2021     07/16/2021    CHOL 242 (H) 07/16/2021    TRIG 162 (H) 07/16/2021    HDL 34 (L) 07/16/2021    ALT 14 07/16/2021    AST 11 07/16/2021     07/16/2021    K 4.1 07/16/2021     07/16/2021    CREATININE 0.61 07/16/2021    BUN 9 07/16/2021    CO2 25 07/16/2021    TSH 0.12 (L) 07/16/2021    INR 1.1 03/02/2021    LABA1C 6.3 12/28/2021    LABMICR CANNOT BE CALCULATED 12/29/2020         ASSESSMENT:  Patient Active Problem List    Diagnosis Date Noted    Moderate mitral stenosis by prior echocardiogram     Moderate aortic stenosis by prior echocardiogram     S/P arthroscopic partial medial meniscectomy -- right knee 03/03/2021    Hypoxia 03/02/2021    Atrial fibrillation with RVR ([facility] [address] 03/02/2021    Aspiration pneumonia ([facility] [address] 03/02/2021    Morbidly obese ([facility] [address] 06/25/2020    Positive FIT (fecal immunochemical test) 11/08/2019    Tobacco abuse 03/16/2016    Noncompliance with therapeutic plan 06/26/2013    Obesity (BMI 30-39.9) 11/06/2012    Essential hypertension 11/06/2012    Diabetes type 2, controlled ([facility] [address] 07/23/2012    Dyslipidemia 07/23/2012    Hypothyroid 07/23/2012    Dermatophytosis of foot 11/02/2011     PLAN:  · Atypical Chest Pain : Due to her risk factors for CAD I believe we should proceed with further evaluation including a stress test, however her chest pain is not worsened with exertion. She is currently asymptomatic in the office today. Antiplatelet Agent: Continue aspirin 325 mg daily. I also reminded her to watch for signs of blood in her stool or black tarry stools and stop the medication immediately if this develops as this could be life threatening.    · Beta Blocker Therapy: Continue metoprolol tartrate (Lopressor)  12.5 mg twice daily I discussed the potential side effects of this medication including lightheadedness and dizziness and told her to call the office if this occurs. ·  Statin Therapy: Continue atorvastatin (Lipitor) 40 mg nightly. · Additional Testing: I ordered a Lexiscan stress test with SPECT imaging to assess for myocardial ischemia. · Counseling: I advised Ms. Chandra Talbot to call our office or go to the emergency room if she develops worsening or persistent chest pain or shortness of breath as this could be life threatening. · Paroxysmal Atrial Fibrillation: with RVR Brief episode intera-operatively with knee surgery. - In normal rhythm now with no known recurrence. Took of Cam Event monitor to day so will check results. Assuming no paroxysmal atrial fibrillation will plan to just monitor for now.  Beta Blocker: Continue Metoprolol tartrate (Lopressor) 25 mg 1/2 tab bid.  Calcium Channel Blocker: Not indicated at this time.  Anti-Arrhythmic: Not indicated. [id number] Score for Atrial Fibrillation Stroke Risk   Risk   Factors  Component Value   C CHF No 0   H HTN Yes 1   A2 Age >= 76 No,  (77 y.o.) 0   D DM Yes 1   S2 Prior Stroke/TIA No 0   V Vascular Disease No 0   A Age 74-69 Yes,  (77 y.o.) 1   Sc Sex female 1    [id number]  Score  4   Score last updated 9/1/60 0:19 PM EST  Click here for a link to the UpToDate guideline \"Atrial Fibrillation: Anticoagulation therapy to prevent embolization  Disclaimer: Risk Score calculation is dependent on accuracy of patient problem list and past encounter diagnosis.  Stroke Risk: CHADS2-VASc Score: 4/9 (4% stroke risk)   Anticoagulation: Not indicated at this time.  I will review the results of her CAM monitor and call her with the results. · Moderate Aortic and Mitral Stenosis: symptomatic at least when in atrial fibrillation with RVR, spontaneously resolved  · Beta Blocker: Continue Metoprolol tartrate (Lopressor) 25 mg 1/2 tab bid. · ACE Inibitor/ARB: Continue lisinopril 5 mg, 1/2 tab daily.      · Essential Hypertension: Controlled   Beta Blocker: Continue Metoprolol tartrate (Lopressor) 25 mg 1/2 tab bid.  ACE Inibitor/ARB: Continue lisinopril 5 mg, 1/2 tab daily. Tobacco Abuse Counseling: I spent several minutes discussing the dangers of tobacco abuse as well as multiple methods for trying to quit smoking. In the end, Ms. Arcelia Richard said that she did not want any assistance at this time but would continue to try and quit reduce and eventually quit smoking in the near future. Once again, thank you for allowing me to participate in this patients care. Please do not hesitate to contact me if I could be of any further assistance. I told Ms. Arcelia Richard to call my office if she had any problems, but otherwise told her to Return in about 2 weeks (around 1/12/2022). However, I would be happy to see her sooner should the need arise. Sincerely,  Xuan Hay[address] Cardiology Specialist     [facility], [address] [place] [address]  Phone: [phone], Fax: [phone]     I believe that the risk of significant morbidity and mortality related to the patient's current medical conditions are: Intermediate. The documentation recoreded by the scribe accurately and completely reflects the services I personally performed and the decisions made by me.  Wendolyn Barthel PA-C  December 29, 2021

## 2021-12-29 NOTE — PATIENT INSTRUCTIONS
SURVEY:    You may be receiving a survey from [email or web address] regarding your visit today. Please complete the survey to enable us to provide the highest quality of care to you and your family. If you cannot score us a very good on any question, please call the office to discuss how we could have made your experience a very good one. Thank you.

## 2022-01-04 ENCOUNTER — HOSPITAL ENCOUNTER (OUTPATIENT)
Dept: NON INVASIVE DIAGNOSTICS | Age: 67
Discharge: HOME | End: 2022-01-04
Payer: MEDICARE

## 2022-01-04 DIAGNOSIS — I10 ESSENTIAL HYPERTENSION: ICD-10-CM

## 2022-01-04 DIAGNOSIS — I35.0 MODERATE AORTIC STENOSIS BY PRIOR ECHOCARDIOGRAM: ICD-10-CM

## 2022-01-04 DIAGNOSIS — I05.0 MODERATE MITRAL STENOSIS BY PRIOR ECHOCARDIOGRAM: ICD-10-CM

## 2022-01-04 DIAGNOSIS — Z71.6 TOBACCO ABUSE COUNSELING: ICD-10-CM

## 2022-01-04 DIAGNOSIS — I48.0 PAF (PAROXYSMAL ATRIAL FIBRILLATION) (HCC): ICD-10-CM

## 2022-01-04 DIAGNOSIS — R06.02 SOB (SHORTNESS OF BREATH): ICD-10-CM

## 2022-01-04 DIAGNOSIS — R07.89 ATYPICAL CHEST PAIN: ICD-10-CM

## 2022-01-04 PROCEDURE — 3430000000 HC RX DIAGNOSTIC RADIOPHARMACEUTICAL: Performed by: PHYSICIAN ASSISTANT

## 2022-01-04 PROCEDURE — A9500 TC99M SESTAMIBI: HCPCS | Performed by: PHYSICIAN ASSISTANT

## 2022-01-04 PROCEDURE — 6360000002 HC RX W HCPCS: Performed by: FAMILY MEDICINE

## 2022-01-04 PROCEDURE — 93017 CV STRESS TEST TRACING ONLY: CPT

## 2022-01-04 RX ADMIN — REGADENOSON 0.4 MG: 0.08 INJECTION, SOLUTION INTRAVENOUS at 08:53

## 2022-01-04 RX ADMIN — Medication 30 MILLICURIE: at 08:53

## 2022-01-05 ENCOUNTER — HOSPITAL ENCOUNTER (OUTPATIENT)
Dept: NON INVASIVE DIAGNOSTICS | Age: 67
Discharge: HOME | End: 2022-01-05
Payer: MEDICARE

## 2022-01-05 PROCEDURE — 78452 HT MUSCLE IMAGE SPECT MULT: CPT

## 2022-01-05 PROCEDURE — 3430000000 HC RX DIAGNOSTIC RADIOPHARMACEUTICAL: Performed by: PHYSICIAN ASSISTANT

## 2022-01-05 PROCEDURE — A9500 TC99M SESTAMIBI: HCPCS | Performed by: PHYSICIAN ASSISTANT

## 2022-01-05 RX ADMIN — Medication 30 MILLICURIE: at 13:05

## 2022-01-06 ENCOUNTER — TELEPHONE (OUTPATIENT)
Dept: CARDIOLOGY | Age: 67
End: 2022-01-06

## 2022-01-06 NOTE — PROCEDURES
[address], [place] [address]                              CARDIAC STRESS TEST    PATIENT NAME: Rachael Murillo                      :        1955  MED REC NO:   [id number]                              ROOM:  ACCOUNT NO:   [de-identified]                           ADMIT DATE: 2022  PROVIDER:     Herminia Tapia MD    CARDIOVASCULAR DIAGNOSTIC DEPARTMENT    DATE OF STUDY:  2022    ORDERING PROVIDER:  Javy Kathleen PA-C    PRIMARY CARE PROVIDER:  Regan Johnson CNP    INTERPRETING PHYSICIAN:  Herminia Tapia MD    EXERCISE MYOCARDIAL PERFUSION STRESS TEST REPORT    Stress/Rest single-isotope SPECT imaging with exercise stress and gated  SPECT imaging    INDICATION:  Assessment of recent chest pain and/or discomfort. Assessment of a cardiac cause of:  Dyspnea. CLINICAL HISTORY:  The patient is a 70-year-old woman with no known  coronary artery disease. Previous cardiac history includes:  None. Other previous history includes:  Chest pain, fatigue, emphysema,  dyspnea, diabetes mellitus. Symptoms just prior to testing included:  None. Relevant medications:  Metoprolol. PROCEDURE:  The patient performed treadmill exercise using a Farrukh  protocol, completing 1:06 minutes and completing an estimated workload  of 8.58 metabolic equivalents (METS). The patient was unable to continue exercise testing due to shortness of  breath, and so regadenoson, at a dose of 0.4 mg, was given in order to  optimize cardiac stress imaging. The test was terminated due to shortness of breath. The heart rate was 66 beats per minute at baseline and increased to 106  beats per minute at peak exercise, which was 69% of the maximum  predicted heart rate. The rest blood pressure was 122/58 mmHg and  increased to 130/60 mmHg, which is a normal response.  During the  procedure, the patient developed fatigue, shortness of breath, leg  fatigue and chest pain (1/3). Myocardial perfusion imaging: Imaging was performed at rest 30-45  minutes following the injection of 30 mCi of sestamibi. At peak  exercise, the patient was injected with 30 mCi of sestamibi and exercise  was continued for 1 minute. Gating post-stress tomographic imaging was  performed 30-45 minutes after stress. STRESS ECG RESULTS:  The resting electrocardiogram demonstrated sinus  bradycardia without definitive ST-segment abnormalities suggestive of  myocardial ischemia. At peak exercise and during recovery, the patient developed:    No significant ST segment changes suggestive of myocardial ischemia with  no premature atrial contractions (PACs) and no premature ventricular  contractions (PVCs). NUCLEAR IMAGING RESULTS:  The overall quality of the study is good. Mild attenuation artifact was seen. There is no evidence of abnormal  lung uptake. Additionally, the right ventricle appears normal.  The  left ventricular cavity is noted to be normal in size on the stress  images. There is no evidence of transient ischemic dilatation (TID) of  the left ventricle. Gated SPECT imaging reveals normal myocardial thickening and wall motion  with a calculated left ventricular ejection fraction of 78%. The rest images demonstrated a small perfusion abnormality of mild  intensity in the anterior region which is most likely due to artifact. On stress imaging, a small perfusion abnormality of mild intensity was  noted in the anterior region which is most likely due to artifact. IMPRESSION:  1. Most likely normal myocardial perfusion imaging with soft tissue  artifact, but without significant evidence of myocardial ischemia or  infarction. 2.  Global left ventricular systolic function was normal, without  regional wall motion abnormalities.     3.  No significant electrocardiographic evidence of myocardial ischemia  during EKG monitoring without significant associated arrhythmias. Overall, these results are most consistent with a low risk for  significant coronary artery disease. Although the patient's results were not completely normal, unless  clinical suspicion for significant ongoing coronary artery ischemia is  high, I would not suggest pursuing additional testing by coronary  angiography. The sensitivity for detecting ischemia on this test may have been  reduced due to the patient being on a beta blocker.         Pam Abernathy MD    D: 01/06/2022 9:31:01       T: 01/06/2022 9:34:08     [email or web address]  Job#: [id number]     Doc#: Unknown    CC:  JOSE DANIEL Perry, APRN-CNP

## 2022-01-13 ENCOUNTER — OFFICE VISIT (OUTPATIENT)
Dept: CARDIOLOGY | Age: 67
End: 2022-01-13
Payer: MEDICARE

## 2022-01-13 VITALS
HEART RATE: 96 BPM | OXYGEN SATURATION: 98 % | BODY MASS INDEX: 34.45 KG/M2 | DIASTOLIC BLOOD PRESSURE: 72 MMHG | SYSTOLIC BLOOD PRESSURE: 132 MMHG | HEIGHT: 65 IN | WEIGHT: 206.8 LBS | RESPIRATION RATE: 18 BRPM

## 2022-01-13 DIAGNOSIS — I10 ESSENTIAL HYPERTENSION: ICD-10-CM

## 2022-01-13 DIAGNOSIS — I48.0 PAF (PAROXYSMAL ATRIAL FIBRILLATION) (HCC): ICD-10-CM

## 2022-01-13 DIAGNOSIS — I35.0 MODERATE AORTIC STENOSIS BY PRIOR ECHOCARDIOGRAM: ICD-10-CM

## 2022-01-13 DIAGNOSIS — I05.0 MODERATE MITRAL STENOSIS BY PRIOR ECHOCARDIOGRAM: ICD-10-CM

## 2022-01-13 DIAGNOSIS — R06.02 SOB (SHORTNESS OF BREATH): ICD-10-CM

## 2022-01-13 DIAGNOSIS — Z71.6 TOBACCO ABUSE COUNSELING: ICD-10-CM

## 2022-01-13 DIAGNOSIS — R07.89 OTHER CHEST PAIN: Primary | ICD-10-CM

## 2022-01-13 DIAGNOSIS — Z72.0 TOBACCO ABUSE: ICD-10-CM

## 2022-01-13 DIAGNOSIS — Z87.891 HISTORY OF TOBACCO USE: Primary | ICD-10-CM

## 2022-01-13 PROCEDURE — 1123F ACP DISCUSS/DSCN MKR DOCD: CPT | Performed by: PHYSICIAN ASSISTANT

## 2022-01-13 PROCEDURE — 3017F COLORECTAL CA SCREEN DOC REV: CPT | Performed by: PHYSICIAN ASSISTANT

## 2022-01-13 PROCEDURE — 1090F PRES/ABSN URINE INCON ASSESS: CPT | Performed by: PHYSICIAN ASSISTANT

## 2022-01-13 PROCEDURE — 4004F PT TOBACCO SCREEN RCVD TLK: CPT | Performed by: PHYSICIAN ASSISTANT

## 2022-01-13 PROCEDURE — G8400 PT W/DXA NO RESULTS DOC: HCPCS | Performed by: PHYSICIAN ASSISTANT

## 2022-01-13 PROCEDURE — 4040F PNEUMOC VAC/ADMIN/RCVD: CPT | Performed by: PHYSICIAN ASSISTANT

## 2022-01-13 PROCEDURE — G8427 DOCREV CUR MEDS BY ELIG CLIN: HCPCS | Performed by: PHYSICIAN ASSISTANT

## 2022-01-13 PROCEDURE — G8417 CALC BMI ABV UP PARAM F/U: HCPCS | Performed by: PHYSICIAN ASSISTANT

## 2022-01-13 PROCEDURE — G8484 FLU IMMUNIZE NO ADMIN: HCPCS | Performed by: PHYSICIAN ASSISTANT

## 2022-01-13 PROCEDURE — 99214 OFFICE O/P EST MOD 30 MIN: CPT | Performed by: PHYSICIAN ASSISTANT

## 2022-01-13 NOTE — PROGRESS NOTES
Dariana Max am scribing for and in the presence of Krishna Saab PA-C. Patient: Dany Blanco  : 1955   Primary Care Physician: Brittany Glaser Might  Today's Date: 2022    Dear Corrine Fan, CYRUS - CNP,     HPI:  Ms. Meera Pizano is a 75 y.o. female who presents today to the office for chest pain that has been bothering her over the past couple of weeks. She previously had been electively admitted to the hospital for atrial fibrillation on 3/4/2021. She has been told in the past that she has a heart murmur. Family cardiac history includes her brother who had a heart attack in his 42's and he has stents. She is a smoker, starting at age 15 at one pack per day over the past 48 years. She has a past medical history significant for hypertension, hyperlipidemia, COPD, ANGE, and type 2 diabetes but denies any other cardiac history. Echocardiogram done on 3/2/2021 showed moderate mitral stenosis and moderate aortic stenosis. Cardiac event monitor done on 3/4/2021: Predominant rhythm: Normal sinus rhythm. Atrial tachycardia (AT) 6 episodes. Longest: 26 beats at average 108 bpm up to 135 bpm. Fastest: 18 beats at average 145 bpm up to 178 bpm. Premature atrial contractions 0.32%. Symptoms were reported but were associated with normal sinus rhythm. Clinical correlation required. Stress test done on 2022: Most likely normal myocardial perfusion imaging with soft tissue artifact, but without significant evidence of myocardial ischemia or infarction. Results are most consistent with a low risk for significant coronary artery disease. Ms. Meera Pizano is here today for a follow up. She reports she continues to have occasional chest pain in her chest that feels like a pressure and squeezing that lasts for about an hour. Nothing seems to make her chest pain better or worse.  She also can get shortness of breath with exertion, this has been worsening over the past couple of months but has been about the same since last being seen. She does have a cough present, it is worse when her throat is dry. She does have occasional lightheaded/dizziness but nothing too bothersome. No falls or near falls. She continues to loose weight without trying. She continues to smoke. She cannot very far due to shortness of breath she had to stop on a bench outside of the hospital due to her shortness of breath after walking through the parking lot. She was very winded walking back to our office from the lobby today. She did have COVID about 4 months ago, she denies any lingering symptoms and states she did not have any worsening shortness of breath when she had COVID. She denies feeling any palpitations. No fever or chills. No abdominal pain, nausea or vomiting. She denied any bleeding problems, bowel issues, problems with her medications or any other concerns at this time. The 10-year ASCVD risk score (Cari Laguna, et al., 2013) is: 37.9%    Values used to calculate the score:      Age: 75 years      Sex: Female      Is Non- : No      Diabetic: Yes      Tobacco smoker: Yes      Systolic Blood Pressure: 751 mmHg      Is BP treated: Yes      HDL Cholesterol: 34 mg/dL      Total Cholesterol: 242 mg/dL    Bleeding Risks: Ms. Wilder Lawler denies any current or recent bleeding problems including a history of a GI bleed, ulcers, recent or upcoming surgeries, blood in her stool or black tarry stools or blood in her urine. Past Medical History:   Diagnosis Date    COPD (chronic obstructive pulmonary disease) ([facility] [address]     Hyperlipidemia     Hyperthyroidism     Hypothyroidism     Obesity     Osteoarthritis     Sleep apnea     Tobacco abuse     Type II or unspecified type diabetes mellitus without mention of complication, not stated as uncontrolled        CURRENT ALLERGIES: Patient has no known allergies. REVIEW OF SYSTEMS: 14 systems were reviewed. Pertinent positives and negatives as above, all else negative. Past Surgical History:   Procedure Laterality Date    FACET JOINT INJECTION Left 4/13/2021    CERVICAL FACET JOINT-C3-C4, C4-C5,C5-C6 performed by Alexander Meyer MD at [place] [address].      right heel    KNEE ARTHROSCOPY Right 03/02/2021    partial medial meniscetomy    KNEE ARTHROSCOPY Right 3/2/2021    KNEE ARTHROSCOPY-WITH MEDIAL AND LATERAL partial MENISCECTOMY performed by Jimmy Horne MD at [place] [address] N/A 4/6/2021    EPIDURAL STEROID INJECTION-LUMBAR L3-4 performed by Alexander Meyer MD at [place] [address] Bilateral 6/8/2021    LUMBAR FACET, L4-5,L5-S1 performed by Alexander Meyer MD at [address] [place] [address]      All of back teeth    TUBAL LIGATION      Social History:  Social History     Tobacco Use    Smoking status: Current Every Day Smoker     Packs/day: 1.00     Years: 40.00     Pack years: 40.00    Smokeless tobacco: Never Used   Vaping Use    Vaping Use: Never used   Substance Use Topics    Alcohol use: No     Alcohol/week: 0.0 standard drinks    Drug use: No        CURRENT MEDICATIONS:  Outpatient Medications Marked as Taking for the 1/13/22 encounter (Office Visit) with Lewis Philippe PA-C   Medication Sig Dispense Refill    atorvastatin (LIPITOR) 40 MG tablet TAKE ONE TABLET BY MOUTH DAILY 90 tablet 3    gabapentin (NEURONTIN) 300 MG capsule TAKE ONE CAPSULE BY MOUTH THREE TIMES A DAY 90 capsule 2    levothyroxine (SYNTHROID) 175 MCG tablet TAKE ONE TABLET BY MOUTH DAILY 90 tablet 3    lisinopril (PRINIVIL;ZESTRIL) 2.5 MG tablet TAKE ONE TABLET BY MOUTH DAILY 90 tablet 3    metFORMIN (GLUCOPHAGE-XR) 500 MG extended release tablet Take 2 tablets by mouth daily (with breakfast) 180 tablet 3    venlafaxine (EFFEXOR XR) 37.5 MG extended release capsule Take 1 capsule by mouth daily 90 capsule 3    blood glucose test strips (TRUE METRIX BLOOD GLUCOSE TEST) strip 1 each by In Vitro route daily 100 each 3    Lancets MISC 1 each by Does not apply route daily 100 each 3    hydrOXYzine (ATARAX) 25 MG tablet TAKE ONE TABLET BY MOUTH EVERY 8 HOURS AS NEEDED FOR ANXIETY 270 tablet 0    albuterol sulfate HFA (PROAIR HFA) 108 (90 Base) MCG/ACT inhaler Inhale 2 puffs into the lungs every 4-6 hours as needed for Wheezing 1 Inhaler 0    Blood Glucose Monitoring Suppl (TRUE METRIX METER) w/Device KIT 1 each by Does not apply route daily 1 kit 0    JANUVIA 100 MG tablet TAKE ONE TABLET BY MOUTH DAILY 90 tablet 3    metoprolol tartrate (LOPRESSOR) 25 MG tablet TAKE 1/2 TABLET BY MOUTH TWICE A DAY 90 tablet 3    aspirin  MG EC tablet Take 1 tablet by mouth daily      tiZANidine (ZANAFLEX) 4 MG tablet Take 1 tablet by mouth every 8 hours as needed (Back pain) 15 tablet 0    acetaminophen (TYLENOL) 325 MG tablet Take 2 tablets by mouth every 8 hours as needed for Pain 30 tablet 0    Elastic Bandages & Supports (KNEE BRACE/HINGED BARS MEDIUM) MISC 1 each by Does not apply route daily 1 each 0       FAMILY HISTORY: family history includes Cancer in her father; Diabetes in her brother, paternal grandmother, and sister. PHYSICAL EXAM:   /72 (Site: Left Upper Arm, Position: Sitting, Cuff Size: Medium Adult)   Pulse 96   Resp 18   Ht 5' 5\" (1.651 m)   Wt 206 lb 12.8 oz (93.8 kg)   SpO2 98%   BMI 34.41 kg/m²  Body mass index is 34.41 kg/m². Constitutional: She is oriented to person, place, and time. She appears well-developed and well-nourished. In no acute distress. HEENT: Normocephalic and atraumatic. No JVD present. Carotid bruit is not present. No mass and no thyromegaly present. No lymphadenopathy present. Cardiovascular: Normal rate, regular rhythm, normal heart sounds. Exam reveals no gallop and no friction rubs. 3/6 systolic murmur, 2nd intercostal space on the RIGHT just lateral to the sternum. Pulmonary/Chest: Effort seems mildly increased and breath sounds normal. No respiratory distress.  She has no wheezes, rhonchi or rales. Abdominal: Soft, non-tender. Bowel sounds and aorta are normal. She exhibits no organomegaly, mass or bruit. Extremities: Trace. No cyanosis or clubbing. 2+ radial and carotid pulses. Distal extremity pulses: 2+ bilaterally. Neurological: She is alert and oriented to person, place, and time. No evidence of gross cranial nerve deficit. Coordination appeared normal.   Skin: Skin is warm and dry. There is no rash or diaphoresis. Psychiatric: She has a normal mood and affect.  Her speech is normal and behavior is normal.      MOST RECENT LABS ON RECORD:   Lab Results   Component Value Date    WBC 8.0 07/16/2021    HGB 14.4 07/16/2021    HCT 44.2 07/16/2021     07/16/2021    CHOL 242 (H) 07/16/2021    TRIG 162 (H) 07/16/2021    HDL 34 (L) 07/16/2021    ALT 14 07/16/2021    AST 11 07/16/2021     07/16/2021    K 4.1 07/16/2021     07/16/2021    CREATININE 0.61 07/16/2021    BUN 9 07/16/2021    CO2 25 07/16/2021    TSH 0.12 (L) 07/16/2021    INR 1.1 03/02/2021    LABA1C 6.3 12/28/2021    LABMICR CANNOT BE CALCULATED 12/29/2020         ASSESSMENT:  Patient Active Problem List    Diagnosis Date Noted    Moderate mitral stenosis by prior echocardiogram     Moderate aortic stenosis by prior echocardiogram     S/P arthroscopic partial medial meniscectomy -- right knee 03/03/2021    Hypoxia 03/02/2021    Atrial fibrillation with RVR ([address] 03/02/2021    Aspiration pneumonia ([address] 03/02/2021    Morbidly obese ([address] 06/25/2020    Positive FIT (fecal immunochemical test) 11/08/2019    Tobacco abuse 03/16/2016    Noncompliance with therapeutic plan 06/26/2013    Obesity (BMI 30-39.9) 11/06/2012    Essential hypertension 11/06/2012    Diabetes type 2, controlled ([address] 07/23/2012    Dyslipidemia 07/23/2012    Hypothyroid 07/23/2012    Dermatophytosis of foot 11/02/2011     PLAN:  · Shortness of breath with almost any exertion:   Additional Testing List: I ordered a echocardiogram to better assess for the etiology of this problem and to help guide future management    Hannahryan Dugan Might, APRN - CNP ordered a Chest CT, we will get her scheduled for this today before she leaves.  If her echo does not identify the cause of her worsening shortness of breath we may consider a PFT.  We also discussed her shortness of breath could be attributed to her deconditioning. She states she is not as active and does not cook as much as she did prior to her  passing away. · Atypical Chest Pain : Low risk stress test completed on 1/4/2022  Antiplatelet Agent: Continue aspirin 325 mg daily. I also reminded her to watch for signs of blood in her stool or black tarry stools and stop the medication immediately if this develops as this could be life threatening. · Beta Blocker Therapy: Continue metoprolol tartrate (Lopressor)  12.5 mg twice daily I discussed the potential side effects of this medication including lightheadedness and dizziness and told her to call the office if this occurs. ·  Statin Therapy: Continue atorvastatin (Lipitor) 40 mg nightly. · Additional Testing: None  · Counseling: I advised Ms. Kenna Rdz to call our office or go to the emergency room if she develops worsening or persistent chest pain or shortness of breath as this could be life threatening. · Because of her chest discomfort, I ordered a echocardiogram to better assess for the etiology of this problem. · Paroxysmal Atrial Fibrillation: with RVR Brief episode intera-operatively with knee surgery. - In normal rhythm now with no known recurrence. Cam Event monitor 3/4/2021 showed no further episodes, will plan to monitor for now.  Beta Blocker: Continue Metoprolol tartrate (Lopressor) 25 mg 1/2 tab bid.  Calcium Channel Blocker: Not indicated at this time.  Anti-Arrhythmic: Not indicated.   [id number] Score for Atrial Fibrillation Stroke Risk   Risk   Factors  Component Value   C CHF No 0 H HTN Yes 1   A2 Age >= 76 No,  (76 y.o.) 0   D DM Yes 1   S2 Prior Stroke/TIA No 0   V Vascular Disease No 0   A Age 74-69 Yes,  (76 y.o.) 1   Sc Sex female 1    [id number]  Score  4   Score last updated 9/7/32 1:33 PM EST  Click here for a link to the UpToDate guideline \"Atrial Fibrillation: Anticoagulation therapy to prevent embolization  Disclaimer: Risk Score calculation is dependent on accuracy of patient problem list and past encounter diagnosis.  Stroke Risk: CHADS2-VASc Score: 4/9 (4% stroke risk)   Anticoagulation: Not indicated at this time. No further episodes of atrial fibrillation noted on CAM.   I ordered an echocardiogram due to her shortness of breath to assess her myocardial structure and function. · Moderate Aortic and Mitral Stenosis: symptomatic at least when in atrial fibrillation with RVR, spontaneously resolved  · Beta Blocker: Continue Metoprolol tartrate (Lopressor) 25 mg 1/2 tab bid. · ACE Inibitor/ARB: Continue lisinopril 5 mg, 1/2 tab daily. · Because of her known history of aortic stenosis as well as her cardiac murmur on physical exam, I took the liberty of ordering a repeat echocardiogram to better assess the severity of this problem. · Essential Hypertension: Controlled   Beta Blocker: Continue Metoprolol tartrate (Lopressor) 25 mg 1/2 tab bid.  ACE Inibitor/ARB: Continue lisinopril 5 mg, 1/2 tab daily. Tobacco Abuse Counseling: I spent several minutes discussing the dangers of tobacco abuse as well as multiple methods for trying to quit smoking. In the end, Ms. Latanya Tolentino said that she did not want any assistance at this time but would continue to try and quit reduce and eventually quit smoking in the near future. Once again, thank you for allowing me to participate in this patients care. Please do not hesitate to contact me if I could be of any further assistance.      I told Ms. Latanya Tolentino to call my office if she had any problems, but otherwise told her to

## 2022-01-13 NOTE — PATIENT INSTRUCTIONS
SURVEY:    You may be receiving a survey from [employer] regarding your visit today. Please complete the survey to enable us to provide the highest quality of care to you and your family. If you cannot score us a very good on any question, please call the office to discuss how we could have made your experience a very good one. Thank you.

## 2022-01-25 ENCOUNTER — TELEPHONE (OUTPATIENT)
Dept: ONCOLOGY | Age: 67
End: 2022-01-25

## 2022-01-25 DIAGNOSIS — Z87.891 PERSONAL HISTORY OF NICOTINE DEPENDENCE: Primary | ICD-10-CM

## 2022-01-25 NOTE — TELEPHONE ENCOUNTER
Order received for ct lung screening does not contain all required data. Order is missing provider's NPI, this will auto generate when provider signs the order. New Pending order placed to include required data. Scheduled for 02/01/22 . Thank you. CT LUNG SCREENING CRITERIA  · Patient is between the ages of 49-80. · Is currently smoking or is a former smoker who has quit smoking within the last 15 years  · Has at least a 20 pack-year smoking history (regardless of patient being a current or former smoker). Pack history is packs per day times years smoked equals pack history. IE:  1 pack a day X 20 years = 20 pack year history. · Has not had a CT lung screening or any CT chest exam within the last year  · Patient is asymptomatic (no signs/symptoms of lung cancer such as shortness of breath, cough, coughing up blood or unexplained significant weight loss). · This patient does not have a condition that limits life expectancy to <5 years  · This patient has participated in a shared decision-making session during which potential risks and benefits of CT Lung Screening were discussed. · This patient was informed of the importance of adherence to annual screening, impact of comorbidities, and ability/willingness to undergo diagnosis and treatment. · This patient was informed of the importance of smoking cessation and/or maintaining smoking abstinence. If applicable, this patient was offered information on smoking cessation counseling services. ORDER MUST INCLUDE: THIS INFORMATION IS AUTOMATICALLY ON ORDER IF ENTERED IN EPIC  · PROVIDER'S NPI - AUTOMATICALLY APPEARS ON ORDER WHEN SIGNED BY THE PROVIDER. · PACK HISTORY   · QUIT DATE IF THEY HAVE QUIT  · DIAGNOSIS: Z87.891 PERSONAL HISTORY OF TABACCO USE OR PERSONAL HISTORY OF NICOTINE DEPENDENCE. (BOTH HAS THE SAME Z CODE) THIS IS REQUIRED FOR MEDICARE/MEDICAID.       FOR PRIVATE INSURANCES DIAGNOSIS CODE CAN BE: Z12.2 ENCOUNTER FOR SCREENING FOR MALIGNANT NEOPLASM OF RESPIRATORY ORGANS OR Z87.891 PERSONAL HISTORY OF TABACCO USE OR PERSONAL HISTORY OF NICOTINE DEPENDENCE.

## 2022-02-25 ENCOUNTER — HOSPITAL ENCOUNTER (OUTPATIENT)
Dept: CT IMAGING | Age: 67
Discharge: HOME | End: 2022-02-27
Payer: MEDICARE

## 2022-02-25 ENCOUNTER — HOSPITAL ENCOUNTER (OUTPATIENT)
Dept: NON INVASIVE DIAGNOSTICS | Age: 67
Discharge: HOME | End: 2022-02-25
Payer: MEDICARE

## 2022-02-25 ENCOUNTER — TELEPHONE (OUTPATIENT)
Dept: PRIMARY CARE CLINIC | Age: 67
End: 2022-02-25

## 2022-02-25 DIAGNOSIS — Z87.891 HISTORY OF TOBACCO USE: ICD-10-CM

## 2022-02-25 DIAGNOSIS — I35.0 MODERATE AORTIC STENOSIS BY PRIOR ECHOCARDIOGRAM: ICD-10-CM

## 2022-02-25 DIAGNOSIS — I05.0 MODERATE MITRAL STENOSIS BY PRIOR ECHOCARDIOGRAM: ICD-10-CM

## 2022-02-25 DIAGNOSIS — I48.0 PAF (PAROXYSMAL ATRIAL FIBRILLATION) (HCC): ICD-10-CM

## 2022-02-25 DIAGNOSIS — I10 ESSENTIAL HYPERTENSION: ICD-10-CM

## 2022-02-25 DIAGNOSIS — R06.02 SOB (SHORTNESS OF BREATH): ICD-10-CM

## 2022-02-25 LAB
LV EF: 65 %
LVEF MODALITY: NORMAL

## 2022-02-25 PROCEDURE — 71271 CT THORAX LUNG CANCER SCR C-: CPT

## 2022-02-25 PROCEDURE — 93306 TTE W/DOPPLER COMPLETE: CPT

## 2022-02-25 NOTE — TELEPHONE ENCOUNTER
----- Message from [address] [place] [address], CYRUS - CNP sent at 2/25/2022 12:06 PM EST -----  Results are normal, please call patient and make them aware.

## 2022-02-28 ENCOUNTER — TELEPHONE (OUTPATIENT)
Dept: CARDIOLOGY | Age: 67
End: 2022-02-28

## 2022-02-28 NOTE — TELEPHONE ENCOUNTER
----- Message from Cody Roman PA-C sent at 2/25/2022  2:14 PM EST -----  No change in echo. Aortic stenosis is still moderate.  Thanks

## 2022-03-14 ENCOUNTER — TELEPHONE (OUTPATIENT)
Dept: PRIMARY CARE CLINIC | Age: 67
End: 2022-03-14

## 2022-03-14 NOTE — TELEPHONE ENCOUNTER
----- Message from Gali Garay sent at 3/12/2022  9:59 AM EST -----  Subject: Message to Provider    QUESTIONS  Information for Provider? Patient wanted refill for the RX for her heart   that she breaks in half and takes one half am and pm. Did not keep the   bottle and does not know the name. Use? [facility], [address] [place], [place] [phone]   ---------------------------------------------------------------------------  --------------  Iqra Rosas INFO  What is the best way for the office to contact you? OK to leave message on   voicemail  Preferred Call Back Phone Number? [phone]  ---------------------------------------------------------------------------  --------------  SCRIPT ANSWERS  Relationship to Patient?  Self

## 2022-03-30 ENCOUNTER — TELEPHONE (OUTPATIENT)
Dept: PRIMARY CARE CLINIC | Age: 67
End: 2022-03-30

## 2022-03-30 DIAGNOSIS — Z12.11 ENCOUNTER FOR SCREENING FECAL OCCULT BLOOD TESTING: Primary | ICD-10-CM

## 2022-04-13 ENCOUNTER — OFFICE VISIT (OUTPATIENT)
Dept: CARDIOLOGY | Age: 67
End: 2022-04-13
Payer: MEDICARE

## 2022-04-13 VITALS
RESPIRATION RATE: 18 BRPM | OXYGEN SATURATION: 94 % | WEIGHT: 207.6 LBS | HEIGHT: 65 IN | BODY MASS INDEX: 34.59 KG/M2 | HEART RATE: 84 BPM | SYSTOLIC BLOOD PRESSURE: 120 MMHG | DIASTOLIC BLOOD PRESSURE: 80 MMHG

## 2022-04-13 DIAGNOSIS — I05.0 MODERATE MITRAL STENOSIS BY PRIOR ECHOCARDIOGRAM: ICD-10-CM

## 2022-04-13 DIAGNOSIS — I10 ESSENTIAL HYPERTENSION: ICD-10-CM

## 2022-04-13 DIAGNOSIS — Z86.79 HISTORY OF ATRIAL FIBRILLATION: ICD-10-CM

## 2022-04-13 DIAGNOSIS — I35.0 MODERATE AORTIC STENOSIS BY PRIOR ECHOCARDIOGRAM: ICD-10-CM

## 2022-04-13 DIAGNOSIS — Z71.6 TOBACCO ABUSE COUNSELING: ICD-10-CM

## 2022-04-13 DIAGNOSIS — R06.02 SOB (SHORTNESS OF BREATH): Primary | ICD-10-CM

## 2022-04-13 PROCEDURE — G8427 DOCREV CUR MEDS BY ELIG CLIN: HCPCS | Performed by: FAMILY MEDICINE

## 2022-04-13 PROCEDURE — 1123F ACP DISCUSS/DSCN MKR DOCD: CPT | Performed by: FAMILY MEDICINE

## 2022-04-13 PROCEDURE — 4040F PNEUMOC VAC/ADMIN/RCVD: CPT | Performed by: FAMILY MEDICINE

## 2022-04-13 PROCEDURE — 1090F PRES/ABSN URINE INCON ASSESS: CPT | Performed by: FAMILY MEDICINE

## 2022-04-13 PROCEDURE — 99214 OFFICE O/P EST MOD 30 MIN: CPT | Performed by: FAMILY MEDICINE

## 2022-04-13 PROCEDURE — 4004F PT TOBACCO SCREEN RCVD TLK: CPT | Performed by: FAMILY MEDICINE

## 2022-04-13 PROCEDURE — 3017F COLORECTAL CA SCREEN DOC REV: CPT | Performed by: FAMILY MEDICINE

## 2022-04-13 PROCEDURE — G8417 CALC BMI ABV UP PARAM F/U: HCPCS | Performed by: FAMILY MEDICINE

## 2022-04-13 PROCEDURE — G8400 PT W/DXA NO RESULTS DOC: HCPCS | Performed by: FAMILY MEDICINE

## 2022-04-13 NOTE — PROGRESS NOTES
Ashu Cedillo am scribing for and in the presence of Roselyn Judge MD, MS, F.A.C.C..    Patient: Tuyet Obando  : 1955   Primary Care Physician: Sima Johnson  Today's Date: 2022    Dear CYRUS Church - CNP,     HPI:  Ms. Doreen Joel is a 75 y.o. female who presents today to the office for chest pain that has been bothering her over the past couple of weeks. She previously had been electively admitted to the hospital for atrial fibrillation on 3/4/2021. She has been told in the past that she has a heart murmur. Family cardiac history includes her brother who had a heart attack in his 42's and he has stents. She is a smoker, starting at age 15 at one pack per day over the past 48 years. She has a past medical history significant for hypertension, hyperlipidemia, COPD, ANGE, and type 2 diabetes but denies any other cardiac history. Echocardiogram done on 3/2/2021 showed moderate mitral stenosis and moderate aortic stenosis. Cardiac event monitor done on 3/4/2021: Predominant rhythm: Normal sinus rhythm. Atrial tachycardia (AT) 6 episodes. Longest: 26 beats at average 108 bpm up to 135 bpm. Fastest: 18 beats at average 145 bpm up to 178 bpm. Premature atrial contractions 0.32%. Symptoms were reported but were associated with normal sinus rhythm. Clinical correlation required. Stress test done on 2022: Most likely normal myocardial perfusion imaging with soft tissue artifact, but without significant evidence of myocardial ischemia or infarction. Results are most consistent with a low risk for significant coronary artery disease. Echo done on 2022- EF 65%, the left ventricular wall thickness is moderately increased. The mean aortic valve gradient is 20 mmHg consistent with moderate aortic Stenosis. Mitral annular calcification is seen. Mild mitral stenosis. Mean gradient of 5.1 mm Hg. Mild mitral regurgitation    Ms. Doreen Joel is here today for a follow up.  She thinks she is doing better since her last visit. She does continue to have shortness of breath but doesn't think it is worsening. She does feel some palpitations, it feels like her heart skips a beat. She denies any chest pain, pressure or tightness. No dizziness or lightheadedness. She denies feeling any palpitations. No fever or chills. No abdominal pain, nausea or vomiting. She denied any bleeding problems, bowel issues, problems with her medications or any other concerns at this time. No falls or near falls. Bleeding Risks: Ms. Rafi Rivera denies any current or recent bleeding problems including a history of a GI bleed, ulcers, recent or upcoming surgeries, blood in her stool or black tarry stools or blood in her urine. Exercise Tolerance: Ms. Rafi Rivera reports that she has a fairly poor exercise tolerance. Her says that she could walk 100 yards with her walker before becoming short of breath. If she didn't have her walker she would have to stop sooner. Past Medical History:   Diagnosis Date    COPD (chronic obstructive pulmonary disease) ([facility] [address]     Hyperlipidemia     Hyperthyroidism     Hypothyroidism     Obesity     Osteoarthritis     Sleep apnea     Tobacco abuse     Type II or unspecified type diabetes mellitus without mention of complication, not stated as uncontrolled        CURRENT ALLERGIES: Patient has no known allergies. REVIEW OF SYSTEMS: 14 systems were reviewed. Pertinent positives and negatives as above, all else negative.      Past Surgical History:   Procedure Laterality Date    FACET JOINT INJECTION Left 4/13/2021    CERVICAL FACET JOINT-C3-C4, C4-C5,C5-C6 performed by Sherrie Walters MD at [facility] [address]      right heel    KNEE ARTHROSCOPY Right 03/02/2021    partial medial meniscetomy    KNEE ARTHROSCOPY Right 3/2/2021    KNEE ARTHROSCOPY-WITH MEDIAL AND LATERAL partial MENISCECTOMY performed by Cristina Teresa MD at [facility] [address] N/A 4/6/2021    EPIDURAL STEROID INJECTION-LUMBAR L3-4 performed by Melissa Plummer MD at [address] [place] [address] Bilateral 6/8/2021    LUMBAR FACET, L4-5,L5-S1 performed by Melissa Plummer MD at [address] [place] [address]      All of back teeth    TUBAL LIGATION      Social History:  Social History     Tobacco Use    Smoking status: Current Every Day Smoker     Packs/day: 1.00     Years: 40.00     Pack years: 40.00    Smokeless tobacco: Never Used   Vaping Use    Vaping Use: Never used   Substance Use Topics    Alcohol use: No     Alcohol/week: 0.0 standard drinks    Drug use: No        CURRENT MEDICATIONS:  Outpatient Medications Marked as Taking for the 4/13/22 encounter (Office Visit) with Panchito Vail MD   Medication Sig Dispense Refill    atorvastatin (LIPITOR) 40 MG tablet TAKE ONE TABLET BY MOUTH DAILY 90 tablet 3    gabapentin (NEURONTIN) 300 MG capsule TAKE ONE CAPSULE BY MOUTH THREE TIMES A DAY 90 capsule 2    levothyroxine (SYNTHROID) 175 MCG tablet TAKE ONE TABLET BY MOUTH DAILY 90 tablet 3    lisinopril (PRINIVIL;ZESTRIL) 2.5 MG tablet TAKE ONE TABLET BY MOUTH DAILY 90 tablet 3    metFORMIN (GLUCOPHAGE-XR) 500 MG extended release tablet Take 2 tablets by mouth daily (with breakfast) 180 tablet 3    venlafaxine (EFFEXOR XR) 37.5 MG extended release capsule Take 1 capsule by mouth daily 90 capsule 3    blood glucose test strips (TRUE METRIX BLOOD GLUCOSE TEST) strip 1 each by In Vitro route daily 100 each 3    Lancets MISC 1 each by Does not apply route daily 100 each 3    hydrOXYzine (ATARAX) 25 MG tablet TAKE ONE TABLET BY MOUTH EVERY 8 HOURS AS NEEDED FOR ANXIETY 270 tablet 0    albuterol sulfate HFA (PROAIR HFA) 108 (90 Base) MCG/ACT inhaler Inhale 2 puffs into the lungs every 4-6 hours as needed for Wheezing 1 Inhaler 0    Blood Glucose Monitoring Suppl (TRUE METRIX METER) w/Device KIT 1 each by Does not apply route daily 1 kit 0    metoprolol tartrate (LOPRESSOR) 25 MG tablet TAKE 1/2 TABLET BY MOUTH TWICE A DAY 90 tablet 3    aspirin  MG EC tablet Take 1 tablet by mouth daily      tiZANidine (ZANAFLEX) 4 MG tablet Take 1 tablet by mouth every 8 hours as needed (Back pain) 15 tablet 0    acetaminophen (TYLENOL) 325 MG tablet Take 2 tablets by mouth every 8 hours as needed for Pain 30 tablet 0    Elastic Bandages & Supports (KNEE BRACE/HINGED BARS MEDIUM) MISC 1 each by Does not apply route daily 1 each 0       FAMILY HISTORY: family history includes Cancer in her father; Diabetes in her brother, paternal grandmother, and sister. PHYSICAL EXAM:   /80 (Site: Right Upper Arm, Position: Sitting, Cuff Size: Large Adult)   Pulse 84   Resp 18   Ht 5' 5\" (1.651 m)   Wt 207 lb 9.6 oz (94.2 kg)   SpO2 94%   BMI 34.55 kg/m²  Body mass index is 34.55 kg/m². Constitutional: She is oriented to person, place, and time. She appears well-developed and well-nourished. In no acute distress. HEENT: Normocephalic and atraumatic. No JVD present. Carotid bruit is not present. No mass and no thyromegaly present. No lymphadenopathy present. Cardiovascular: Normal rate, regular rhythm, normal heart sounds. Exam reveals no gallop and no friction rubs. 4/6 systolic murmur, 2nd intercostal space on the RIGHT just lateral to the sternum. Pulmonary/Chest: Effort seems mildly increased and breath sounds normal. No respiratory distress. She has no wheezes, rhonchi or rales. Abdominal: Soft, non-tender. Bowel sounds and aorta are normal. She exhibits no organomegaly, mass or bruit. Extremities: Trace. No cyanosis or clubbing. 2+ radial and carotid pulses. Distal extremity pulses: 2+ bilaterally. Neurological: She is alert and oriented to person, place, and time. No evidence of gross cranial nerve deficit. Coordination appeared normal.   Skin: Skin is warm and dry. There is no rash or diaphoresis. Psychiatric: She has a normal mood and affect.  Her speech is normal and behavior is normal.      MOST RECENT LABS ON RECORD:   Lab Results   Component Value Date    WBC 8.0 07/16/2021    HGB 14.4 07/16/2021    HCT 44.2 07/16/2021     07/16/2021    CHOL 242 (H) 07/16/2021    TRIG 162 (H) 07/16/2021    HDL 34 (L) 07/16/2021    ALT 14 07/16/2021    AST 11 07/16/2021     07/16/2021    K 4.1 07/16/2021     07/16/2021    CREATININE 0.61 07/16/2021    BUN 9 07/16/2021    CO2 25 07/16/2021    TSH 0.12 (L) 07/16/2021    INR 1.1 03/02/2021    LABA1C 6.3 12/28/2021    LABMICR CANNOT BE CALCULATED 12/29/2020         ASSESSMENT:  Patient Active Problem List    Diagnosis Date Noted    Moderate mitral stenosis by prior echocardiogram     Moderate aortic stenosis by prior echocardiogram     S/P arthroscopic partial medial meniscectomy -- right knee 03/03/2021    Hypoxia 03/02/2021    Atrial fibrillation with RVR ([facility] [address] 03/02/2021    Aspiration pneumonia ([facility] [address] 03/02/2021    Morbidly obese ([facility] [address] 06/25/2020    Positive FIT (fecal immunochemical test) 11/08/2019    Tobacco abuse 03/16/2016    Noncompliance with therapeutic plan 06/26/2013    Obesity (BMI 30-39.9) 11/06/2012    Essential hypertension 11/06/2012    Diabetes type 2, controlled ([facility] [address] 07/23/2012    Dyslipidemia 07/23/2012    Hypothyroid 07/23/2012    Dermatophytosis of foot 11/02/2011     1. History of atrial fibrillation    2. SOB (shortness of breath)    3. Moderate aortic stenosis by prior echocardiogram    4. Moderate mitral stenosis by prior echocardiogram    5. Essential hypertension    6. Tobacco abuse counseling       PLAN:  · Shortness of breath with mild exertion: stable  · Will continue to monitor for now    · History of Atrial Fibrillation: with RVR Brief episode intera-operatively with knee surgery. - In normal rhythm now with no known recurrence. Cam Event monitor 3/4/2021 showed no further episodes, will plan to monitor for now.    Beta Blocker: Continue Metoprolol tartrate (Lopressor) 25 mg 1/2 tab bid.  Calcium Channel Blocker: Not indicated at this time.  Anticoagulation: Not indicated at this time. No further episodes of atrial fibrillation noted on CAM.    · Moderate Aortic and Mitral Stenosis: symptomatic at least when in atrial fibrillation with RVR, spontaneously resolved  Antiplatelet Agent: Continue Aspirin 325 mg daily. · Beta Blocker: Continue Metoprolol tartrate (Lopressor) 25 mg 1/2 tab bid. · ACE Inibitor/ARB: Continue lisinopril 5 mg, 1/2 tab daily. · Essential Hypertension: Controlled   Beta Blocker: Continue Metoprolol tartrate (Lopressor) 25 mg 1/2 tab bid.  ACE Inibitor/ARB: Continue lisinopril 5 mg, 1/2 tab daily. · Hyperlipidemia: Mixed  · Cholesterol Reduction Therapy: Continue Atorvastatin (Lipitor) 40 mg daily. I discussed the potential benefits of statin therapy as well as the potential risks including myalgia as well as the rare but potentially serious complication of liver or kidney damage. Although rare, I told them that this could be serious and therefore told them to stop the medication immediately and call if they developed any severe muscle aches or pains and they agreed to do so. Tobacco Abuse Counseling: I spent several minutes discussing the dangers of tobacco abuse as well as multiple methods for trying to quit smoking. In the end, Ms. Tyler Cain said that she did not want any assistance at this time but would continue to try and quit reduce and eventually quit smoking in the near future. Once again, thank you for allowing me to participate in this patients care. Please do not hesitate to contact me if I could be of any further assistance. I told Ms. Tyler Cain to call my office if she had any problems, but otherwise told her to Return in about 1 year (around 4/13/2023). However, I would be happy to see her sooner should the need arise. Black Finch MD, MS, F.A.C.C.   [facility]) [facility], [address], [facility], [facility], [place] [address]  Phone: [phone], Fax: [phone]     I believe that the risk of significant morbidity and mortality related to the patient's current medical conditions are: Intermediate. Approximately 35 minutes were spent during prep work, discussion and exam of the patient, and follow up documentation and all of their questions were answered. The documentation recorded by the scribe, accurately and completely reflects the services I personally performed and the decisions made by me. Puma May MD, MS, F.A.C.C.  April 13, 2022

## 2022-04-14 ENCOUNTER — TELEPHONE (OUTPATIENT)
Dept: PRIMARY CARE CLINIC | Age: 67
End: 2022-04-14

## 2022-04-14 NOTE — TELEPHONE ENCOUNTER
Called patient to remind to return OC FIT test to the office, patient stats that she forgot to pick kit up. States will  kit later today.

## 2022-06-27 NOTE — TELEPHONE ENCOUNTER
Health Maintenance   Topic Date Due    COVID-19 Vaccine (1) Never done    DEXA (modify frequency per FRAX score)  Never done    Annual Wellness Visit (AWV)  11/19/2021    Colorectal Cancer Screen  12/28/2021    Diabetic microalbuminuria test  12/29/2021    Depression Screen  02/19/2022    Diabetic foot exam  06/28/2022    Lipids  07/16/2022    Diabetic retinal exam  07/08/2022 (Originally 11/6/2013)    Flu vaccine (Season Ended) 12/28/2022 (Originally 9/1/2022)    Shingles vaccine (3 of 3) 12/28/2022 (Originally 10/13/2021)    Pneumococcal 65+ years Vaccine (2 - PCV) 12/28/2022 (Originally 7/8/2017)    A1C test (Diabetic or Prediabetic)  12/28/2022    Low dose CT lung screening  02/25/2023    Breast cancer screen  03/19/2023    DTaP/Tdap/Td vaccine (2 - Td or Tdap) 06/28/2031    Hepatitis C screen  Completed    Hepatitis A vaccine  Aged Out    Hib vaccine  Aged Out    Meningococcal (ACWY) vaccine  Aged Out             (applicable per patient's age: Cancer Screenings, Depression Screening, Fall Risk Screening, Immunizations)    Hemoglobin A1C (%)   Date Value   12/28/2021 6.3   02/19/2021 7.0 (H)   12/29/2020 7.2 (H)     Microalb/Crt.  Ratio (mcg/mg creat)   Date Value   12/29/2020 CANNOT BE CALCULATED     LDL Cholesterol (mg/dL)   Date Value   07/16/2021 176 (H)     AST (U/L)   Date Value   07/16/2021 11     ALT (U/L)   Date Value   07/16/2021 14     BUN (mg/dL)   Date Value   07/16/2021 9      (goal A1C is < 7)   (goal LDL is <100) need 30-50% reduction from baseline     BP Readings from Last 3 Encounters:   04/13/22 120/80   01/13/22 132/72   12/29/21 110/64    (goal /80)      All Future Testing planned in [facility]:  Lab Frequency Next Occurrence   Basic Metabolic Panel Once 59/68/7940   T4, Free Once 06/26/2022   TSH without Reflex Once 06/26/2022   CBC Auto Differential Once 06/26/2022   ALT Once 06/28/2022   AST Once 06/28/2022   Hemoglobin A1C Once 06/26/2022   Lipid Panel Once 06/26/2022   Microalbumin, Ur Once 06/26/2022   CT lung screen [Initial/Annual] Once 01/25/2022   POCT Fecal Immunochemical Test (FIT) Once 04/28/2022       Next Visit Date:  Future Appointments   Date Time Provider Christopher Petersen   6/28/2022  9:00 AM Cheryle Huh Might, APRN - CNP [facility]   4/19/2023 10:00 AM Berkley Parker MD [facility]            Patient Active Problem List:     Dermatophytosis of foot     Diabetes type 2, controlled ([facility] [address]     Dyslipidemia     Hypothyroid     Obesity (BMI 30-39. 9)     Essential hypertension     Noncompliance with therapeutic plan     Tobacco abuse     Positive FIT (fecal immunochemical test)     Morbidly obese (HCC)     Hypoxia     Atrial fibrillation with RVR (HCC)     Aspiration pneumonia (HCC)     S/P arthroscopic partial medial meniscectomy -- right knee     Moderate mitral stenosis by prior echocardiogram     Moderate aortic stenosis by prior echocardiogram

## 2022-06-28 ENCOUNTER — TELEPHONE (OUTPATIENT)
Dept: PRIMARY CARE CLINIC | Age: 67
End: 2022-06-28

## 2022-06-28 ENCOUNTER — OFFICE VISIT (OUTPATIENT)
Dept: PRIMARY CARE CLINIC | Age: 67
End: 2022-06-28
Payer: MEDICARE

## 2022-06-28 ENCOUNTER — HOSPITAL ENCOUNTER (OUTPATIENT)
Age: 67
Discharge: HOME | End: 2022-06-28
Payer: MEDICARE

## 2022-06-28 VITALS
RESPIRATION RATE: 20 BRPM | HEART RATE: 68 BPM | OXYGEN SATURATION: 98 % | DIASTOLIC BLOOD PRESSURE: 74 MMHG | SYSTOLIC BLOOD PRESSURE: 128 MMHG | BODY MASS INDEX: 35.25 KG/M2 | WEIGHT: 211.8 LBS | TEMPERATURE: 97.7 F

## 2022-06-28 DIAGNOSIS — E78.5 DYSLIPIDEMIA: ICD-10-CM

## 2022-06-28 DIAGNOSIS — E11.9 CONTROLLED TYPE 2 DIABETES MELLITUS WITHOUT COMPLICATION, WITHOUT LONG-TERM CURRENT USE OF INSULIN (HCC): ICD-10-CM

## 2022-06-28 DIAGNOSIS — E66.01 SEVERE OBESITY (BMI 35.0-39.9) WITH COMORBIDITY (HCC): ICD-10-CM

## 2022-06-28 DIAGNOSIS — Z91.199 NONCOMPLIANCE WITH THERAPEUTIC PLAN: ICD-10-CM

## 2022-06-28 DIAGNOSIS — E11.8 CONTROLLED TYPE 2 DIABETES MELLITUS WITH COMPLICATION, WITHOUT LONG-TERM CURRENT USE OF INSULIN (HCC): Primary | ICD-10-CM

## 2022-06-28 DIAGNOSIS — E03.1 CONGENITAL HYPOTHYROIDISM WITHOUT GOITER: ICD-10-CM

## 2022-06-28 DIAGNOSIS — F43.0 STRESS REACTION: ICD-10-CM

## 2022-06-28 LAB
ABSOLUTE EOS #: 0.45 K/UL (ref 0–0.44)
ABSOLUTE IMMATURE GRANULOCYTE: 0.04 K/UL (ref 0–0.3)
ABSOLUTE LYMPH #: 2.46 K/UL (ref 1.1–3.7)
ABSOLUTE MONO #: 0.64 K/UL (ref 0.1–1.2)
ALT SERPL-CCNC: 14 U/L (ref 5–33)
ANION GAP SERPL CALCULATED.3IONS-SCNC: 11 MMOL/L (ref 9–17)
AST SERPL-CCNC: 13 U/L
BASOPHILS # BLD: 1 % (ref 0–2)
BASOPHILS ABSOLUTE: 0.09 K/UL (ref 0–0.2)
BUN BLDV-MCNC: 8 MG/DL (ref 8–23)
BUN/CREAT BLD: 13 (ref 9–20)
CALCIUM SERPL-MCNC: 9.4 MG/DL (ref 8.6–10.4)
CHLORIDE BLD-SCNC: 104 MMOL/L (ref 98–107)
CHOLESTEROL/HDL RATIO: 5.3
CHOLESTEROL: 202 MG/DL
CO2: 24 MMOL/L (ref 20–31)
CREAT SERPL-MCNC: 0.62 MG/DL (ref 0.5–0.9)
CREATININE URINE: 29.6 MG/DL (ref 28–217)
EOSINOPHILS RELATIVE PERCENT: 5 % (ref 1–4)
GFR AFRICAN AMERICAN: >60 ML/MIN
GFR NON-AFRICAN AMERICAN: >60 ML/MIN
GFR SERPL CREATININE-BSD FRML MDRD: ABNORMAL ML/MIN/{1.73_M2}
GFR SERPL CREATININE-BSD FRML MDRD: ABNORMAL ML/MIN/{1.73_M2}
GLUCOSE BLD-MCNC: 133 MG/DL (ref 70–99)
HBA1C MFR BLD: 6.5 %
HCT VFR BLD CALC: 44.9 % (ref 36.3–47.1)
HDLC SERPL-MCNC: 38 MG/DL
HEMOGLOBIN: 14.6 G/DL (ref 11.9–15.1)
IMMATURE GRANULOCYTES: 0 %
LDL CHOLESTEROL: 141 MG/DL (ref 0–130)
LYMPHOCYTES # BLD: 27 % (ref 24–43)
MCH RBC QN AUTO: 28.6 PG (ref 25.2–33.5)
MCHC RBC AUTO-ENTMCNC: 32.5 G/DL (ref 28.4–34.8)
MCV RBC AUTO: 88 FL (ref 82.6–102.9)
MICROALBUMIN/CREAT 24H UR: <12 MG/L
MICROALBUMIN/CREAT UR-RTO: NORMAL MCG/MG CREAT
MONOCYTES # BLD: 7 % (ref 3–12)
NRBC AUTOMATED: 0 PER 100 WBC
PDW BLD-RTO: 13.5 % (ref 11.8–14.4)
PLATELET # BLD: 274 K/UL (ref 138–453)
PMV BLD AUTO: 9.7 FL (ref 8.1–13.5)
POTASSIUM SERPL-SCNC: 4.1 MMOL/L (ref 3.7–5.3)
RBC # BLD: 5.1 M/UL (ref 3.95–5.11)
SEG NEUTROPHILS: 60 % (ref 36–65)
SEGMENTED NEUTROPHILS ABSOLUTE COUNT: 5.33 K/UL (ref 1.5–8.1)
SODIUM BLD-SCNC: 139 MMOL/L (ref 135–144)
THYROXINE, FREE: 1.29 NG/DL (ref 0.93–1.7)
TRIGL SERPL-MCNC: 113 MG/DL
TSH SERPL DL<=0.05 MIU/L-ACNC: 0.51 UIU/ML (ref 0.3–5)
WBC # BLD: 9 K/UL (ref 3.5–11.3)

## 2022-06-28 PROCEDURE — 99214 OFFICE O/P EST MOD 30 MIN: CPT | Performed by: NURSE PRACTITIONER

## 2022-06-28 PROCEDURE — G8427 DOCREV CUR MEDS BY ELIG CLIN: HCPCS | Performed by: NURSE PRACTITIONER

## 2022-06-28 PROCEDURE — 1090F PRES/ABSN URINE INCON ASSESS: CPT | Performed by: NURSE PRACTITIONER

## 2022-06-28 PROCEDURE — 80048 BASIC METABOLIC PNL TOTAL CA: CPT

## 2022-06-28 PROCEDURE — G8417 CALC BMI ABV UP PARAM F/U: HCPCS | Performed by: NURSE PRACTITIONER

## 2022-06-28 PROCEDURE — 80061 LIPID PANEL: CPT

## 2022-06-28 PROCEDURE — 84450 TRANSFERASE (AST) (SGOT): CPT

## 2022-06-28 PROCEDURE — 36415 COLL VENOUS BLD VENIPUNCTURE: CPT

## 2022-06-28 PROCEDURE — 84443 ASSAY THYROID STIM HORMONE: CPT

## 2022-06-28 PROCEDURE — 3044F HG A1C LEVEL LT 7.0%: CPT | Performed by: NURSE PRACTITIONER

## 2022-06-28 PROCEDURE — 84439 ASSAY OF FREE THYROXINE: CPT

## 2022-06-28 PROCEDURE — 4004F PT TOBACCO SCREEN RCVD TLK: CPT | Performed by: NURSE PRACTITIONER

## 2022-06-28 PROCEDURE — 84460 ALANINE AMINO (ALT) (SGPT): CPT

## 2022-06-28 PROCEDURE — G8400 PT W/DXA NO RESULTS DOC: HCPCS | Performed by: NURSE PRACTITIONER

## 2022-06-28 PROCEDURE — 82570 ASSAY OF URINE CREATININE: CPT

## 2022-06-28 PROCEDURE — 2022F DILAT RTA XM EVC RTNOPTHY: CPT | Performed by: NURSE PRACTITIONER

## 2022-06-28 PROCEDURE — 3017F COLORECTAL CA SCREEN DOC REV: CPT | Performed by: NURSE PRACTITIONER

## 2022-06-28 PROCEDURE — 82043 UR ALBUMIN QUANTITATIVE: CPT

## 2022-06-28 PROCEDURE — 85025 COMPLETE CBC W/AUTO DIFF WBC: CPT

## 2022-06-28 PROCEDURE — 83036 HEMOGLOBIN GLYCOSYLATED A1C: CPT | Performed by: NURSE PRACTITIONER

## 2022-06-28 PROCEDURE — 1123F ACP DISCUSS/DSCN MKR DOCD: CPT | Performed by: NURSE PRACTITIONER

## 2022-06-28 RX ORDER — ATORVASTATIN CALCIUM 80 MG/1
TABLET, FILM COATED ORAL
Qty: 90 TABLET | Refills: 3 | Status: SHIPPED | OUTPATIENT
Start: 2022-06-28

## 2022-06-28 RX ORDER — VENLAFAXINE HYDROCHLORIDE 37.5 MG/1
37.5 CAPSULE, EXTENDED RELEASE ORAL DAILY
Qty: 90 CAPSULE | Refills: 3 | Status: SHIPPED | OUTPATIENT
Start: 2022-06-28

## 2022-06-28 ASSESSMENT — PATIENT HEALTH QUESTIONNAIRE - PHQ9
6. FEELING BAD ABOUT YOURSELF - OR THAT YOU ARE A FAILURE OR HAVE LET YOURSELF OR YOUR FAMILY DOWN: 0
SUM OF ALL RESPONSES TO PHQ QUESTIONS 1-9: 6
SUM OF ALL RESPONSES TO PHQ QUESTIONS 1-9: 6
9. THOUGHTS THAT YOU WOULD BE BETTER OFF DEAD, OR OF HURTING YOURSELF: 0
2. FEELING DOWN, DEPRESSED OR HOPELESS: 1
3. TROUBLE FALLING OR STAYING ASLEEP: 1
4. FEELING TIRED OR HAVING LITTLE ENERGY: 1
8. MOVING OR SPEAKING SO SLOWLY THAT OTHER PEOPLE COULD HAVE NOTICED. OR THE OPPOSITE, BEING SO FIGETY OR RESTLESS THAT YOU HAVE BEEN MOVING AROUND A LOT MORE THAN USUAL: 0
SUM OF ALL RESPONSES TO PHQ9 QUESTIONS 1 & 2: 3
10. IF YOU CHECKED OFF ANY PROBLEMS, HOW DIFFICULT HAVE THESE PROBLEMS MADE IT FOR YOU TO DO YOUR WORK, TAKE CARE OF THINGS AT HOME, OR GET ALONG WITH OTHER PEOPLE: 1
5. POOR APPETITE OR OVEREATING: 1
1. LITTLE INTEREST OR PLEASURE IN DOING THINGS: 2
SUM OF ALL RESPONSES TO PHQ QUESTIONS 1-9: 6
7. TROUBLE CONCENTRATING ON THINGS, SUCH AS READING THE NEWSPAPER OR WATCHING TELEVISION: 0
SUM OF ALL RESPONSES TO PHQ QUESTIONS 1-9: 6

## 2022-06-28 ASSESSMENT — ENCOUNTER SYMPTOMS
VISUAL CHANGE: 0
SORE THROAT: 0
TROUBLE SWALLOWING: 0
BOWEL INCONTINENCE: 0
VOMITING: 0
SHORTNESS OF BREATH: 0
NAUSEA: 0
ABDOMINAL PAIN: 0
COUGH: 0
PHOTOPHOBIA: 0
CONSTIPATION: 0
DIARRHEA: 0
BLURRED VISION: 0
RHINORRHEA: 0
WHEEZING: 0
BACK PAIN: 1

## 2022-06-28 NOTE — TELEPHONE ENCOUNTER
----- Message from CYRUS Smart CNP sent at 6/28/2022  4:19 PM EDT -----  Labs are stable except for her cholesterol. I want her to increase atorvastatin to 80 mg daily. I have sent a new prescription for her. Continue all other medications. Thank you.

## 2022-06-28 NOTE — TELEPHONE ENCOUNTER
Contacted patient in regards to lab results. Patient informed of new script to  at the pharmacy. Patient verbalized understanding.

## 2022-06-28 NOTE — PROGRESS NOTES
Name: Rosaline Marshall  : 1955         Chief Complaint:     Chief Complaint   Patient presents with    Diabetes     routine check. requesting handicap placard    Anxiety     has not been taking medication, unsure of time frame    Cholesterol Problem    Hypertension       History of Present Illness:      Rosaline Marshall is a 75 y.o.  female who presents with Diabetes (routine check. requesting handicap placard), Anxiety (has not been taking medication, unsure of time frame), Cholesterol Problem, and Hypertension      Cameron Doctor is here today for a routine office visit. Nervousness/stresspatient states she has been out of venlafaxine due to a mixup at the pharmacy. She is having a little more anxiety. She did recently lose her permit to COVID complications. See below for further comment. Noncompliancepatient does have a history of not taking her medications when she runs out. She forgets to call for refill. She has been out of her DPP 4 for an unknown amount of time. Obesityimproving, patient has lost a significant amount of weight with diet. She is not exercising due to her chronic back pain. Diabetes  She presents for her follow-up diabetic visit. She has type 2 diabetes mellitus. Her disease course has been stable. Hypoglycemia symptoms include nervousness/anxiousness. Pertinent negatives for hypoglycemia include no confusion, dizziness, headaches, seizures or sweats. Associated symptoms include fatigue. Pertinent negatives for diabetes include no blurred vision, no chest pain, no polydipsia, no polyphagia, no polyuria, no visual change, no weakness and no weight loss. There are no hypoglycemic complications. Pertinent negatives for diabetic complications include no CVA, heart disease, nephropathy or peripheral neuropathy. Risk factors for coronary artery disease include hypertension, stress, sedentary lifestyle, post-menopausal, obesity, family history, dyslipidemia and diabetes mellitus. Current diabetic treatment includes oral agent (dual therapy). She is compliant with treatment most of the time. Her weight is stable. She is following a generally healthy diet. Meal planning includes avoidance of concentrated sweets. She has not had a previous visit with a dietitian. She never participates in exercise. There is no change in her home blood glucose trend. Her breakfast blood glucose range is generally 130-140 mg/dl. An ACE inhibitor/angiotensin II receptor blocker is being taken. She does not see a podiatrist.Eye exam is current. Hyperlipidemia  This is a chronic problem. The current episode started more than 1 year ago. The problem is controlled. Recent lipid tests were reviewed and are normal. Exacerbating diseases include diabetes, hypothyroidism and obesity. She has no history of liver disease or nephrotic syndrome. Factors aggravating her hyperlipidemia include smoking. Pertinent negatives include no chest pain, leg pain, myalgias or shortness of breath. Current antihyperlipidemic treatment includes statins. The current treatment provides moderate improvement of lipids. Compliance problems include adherence to exercise and adherence to diet. Risk factors for coronary artery disease include diabetes mellitus, dyslipidemia, family history, obesity, post-menopausal, a sedentary lifestyle and stress. Mental Health Problem  The primary symptoms include negative symptoms and somatic symptoms. The primary symptoms do not include dysphoric mood, delusions, hallucinations, bizarre behavior or disorganized speech. Primary symptoms comment: ANXIETY. The current episode started more than 1 month ago. This is a chronic problem. The negative symptoms began more than 1 month ago. The negative symptoms appear to have been worsening since their onset. The negative symptoms include attention impairment. The somatic symptoms began more than 1 month ago.  The somatic symptoms have been worsening since their onset. The symptoms are moderate. Somatic symptoms include fatigue and back pain (chronic). Somatic symptoms do not include headaches, abdominal pain, constipation or myalgias. The degree of incapacity that she is experiencing as a consequence of her illness is mild. Additional symptoms of the illness include insomnia, fatigue, attention impairment and distractible. Additional symptoms of the illness do not include anhedonia, hypersomnia, appetite change, unexpected weight change, agitation, psychomotor retardation, feelings of worthlessness, euphoric mood, increased goal-directed activity, flight of ideas, inflated self-esteem, decreased need for sleep, poor judgment, visual change, headaches, abdominal pain or seizures. She does not admit to suicidal ideas. She does not have a plan to attempt suicide. She does not contemplate harming herself. She has not already injured self. She does not contemplate injuring another person. She has not already  injured another person. Risk factors that are present for mental illness include a history of mental illness and a family history of mental illness.          Past Medical History:     Past Medical History:   Diagnosis Date    COPD (chronic obstructive pulmonary disease) ([facility] [address]     Hyperlipidemia     Hyperthyroidism     Hypothyroidism     Obesity     Osteoarthritis     Sleep apnea     Stress reaction 6/28/2022    Tobacco abuse     Type II or unspecified type diabetes mellitus without mention of complication, not stated as uncontrolled       Reviewed all health maintenance requirements and ordered appropriate tests  Health Maintenance Due   Topic Date Due    Annual Wellness Visit (AWV)  11/19/2021    Colorectal Cancer Screen  12/28/2021    Diabetic microalbuminuria test  12/29/2021    Depression Screen  02/19/2022    Lipids  07/16/2022       Past Surgical History:     Past Surgical History:   Procedure Laterality Date    FACET JOINT INJECTION Left 4/13/2021 CERVICAL FACET JOINT-C3-C4, C4-C5,C5-C6 performed by Cheryl Flores MD at [facility] [address]      right heel    KNEE ARTHROSCOPY Right 03/02/2021    partial medial meniscetomy    KNEE ARTHROSCOPY Right 3/2/2021    KNEE ARTHROSCOPY-WITH MEDIAL AND LATERAL partial MENISCECTOMY performed by Tori Wayne MD at [address] [place] [address] N/A 4/6/2021    EPIDURAL STEROID INJECTION-LUMBAR L3-4 performed by Cheryl Flores MD at [facility] Bilateral 6/8/2021    LUMBAR FACET, L4-5,L5-S1 performed by Cheryl Flores MD at [facility] of back teeth    TUBAL LIGATION          Medications:       Prior to Admission medications    Medication Sig Start Date End Date Taking?  Authorizing Provider   SITagliptin (JANUVIA) 100 MG tablet TAKE ONE TABLET BY MOUTH DAILY 6/28/22  Yes Helen Hammans Might, APRN - CNP   venlafaxine (EFFEXOR XR) 37.5 MG extended release capsule Take 1 capsule by mouth daily 6/28/22  Yes Helen Hammans Might, APRN - CNP   metoprolol tartrate (LOPRESSOR) 25 MG tablet TAKE 1/2 TABLET BY MOUTH TWICE A DAY 6/27/22  Yes Helen Hammans Might, APRN - CNP   atorvastatin (LIPITOR) 40 MG tablet TAKE ONE TABLET BY MOUTH DAILY 12/28/21  Yes Helen Hammans Might, APRN - CNP   gabapentin (NEURONTIN) 300 MG capsule TAKE ONE CAPSULE BY MOUTH THREE TIMES A DAY 12/28/21 6/28/22 Yes Helen Hammans Might, APRN - CNP   levothyroxine (SYNTHROID) 175 MCG tablet TAKE ONE TABLET BY MOUTH DAILY 12/28/21  Yes Helen Hammans Might, APRN - CNP   lisinopril (PRINIVIL;ZESTRIL) 2.5 MG tablet TAKE ONE TABLET BY MOUTH DAILY 12/28/21  Yes Helen Hammans Might, APRN - CNP   metFORMIN (GLUCOPHAGE-XR) 500 MG extended release tablet Take 2 tablets by mouth daily (with breakfast) 12/28/21  Yes Helen Hammans Might, APRN - CNP   blood glucose test strips (TRUE METRIX BLOOD GLUCOSE TEST) strip 1 each by In Vitro route daily  Patient taking differently: Inject 1 each into the skin as needed  9/1/21  Yes Eron Mcconnell Negative for bladder incontinence, difficulty urinating, dysuria and pelvic pain. Musculoskeletal: Positive for back pain (chronic) and gait problem (intermittent). Negative for myalgias, neck pain and neck stiffness. Skin: Negative for rash. Neurological: Positive for numbness. Negative for dizziness, tingling, seizures, syncope, weakness, light-headedness, headaches and paresthesias. Psychiatric/Behavioral: Positive for decreased concentration and sleep disturbance. Negative for agitation, behavioral problems, confusion, dysphoric mood, hallucinations, self-injury and suicidal ideas. The patient is nervous/anxious and has insomnia. The patient is not hyperactive. Physical Exam:   Vitals:  /74 (Position: Sitting)   Pulse 68   Temp 97.7 °F (36.5 °C) (Temporal)   Resp 20   Wt 211 lb 12.8 oz (96.1 kg)   SpO2 98%   BMI 35.25 kg/m²     Physical Exam  Vitals and nursing note reviewed. Constitutional:       General: She is not in acute distress. Appearance: Normal appearance. She is obese. She is not ill-appearing. HENT:      Mouth/Throat:      Mouth: Mucous membranes are moist.   Eyes:      General: No scleral icterus. Conjunctiva/sclera: Conjunctivae normal.   Neck:      Vascular: No carotid bruit. Cardiovascular:      Rate and Rhythm: Normal rate and regular rhythm. Heart sounds: No murmur heard. Pulmonary:      Effort: Pulmonary effort is normal.      Breath sounds: Normal breath sounds. No wheezing. Abdominal:      General: Bowel sounds are normal. There is no distension. Palpations: Abdomen is soft. Tenderness: There is no abdominal tenderness. Musculoskeletal:      Cervical back: Full passive range of motion without pain, normal range of motion and neck supple. Right lower leg: No edema. Left lower leg: No edema. Lymphadenopathy:      Cervical: No cervical adenopathy. Skin:     General: Skin is warm and dry. Findings: No rash. Neurological:      Mental Status: She is alert and oriented to person, place, and time. Psychiatric:         Mood and Affect: Mood normal.         Behavior: Behavior normal.         Data:     Lab Results   Component Value Date     07/16/2021    K 4.1 07/16/2021     07/16/2021    CO2 25 07/16/2021    BUN 9 07/16/2021    CREATININE 0.61 07/16/2021    GLUCOSE 114 07/16/2021    GLUCOSE 103 05/24/2012    PROT 7.0 03/02/2021    LABALBU 3.1 03/02/2021    LABALBU 4.5 10/27/2011    BILITOT 0.27 03/02/2021    ALKPHOS 125 03/02/2021    AST 11 07/16/2021    ALT 14 07/16/2021     Lab Results   Component Value Date    WBC 8.0 07/16/2021    RBC 5.27 07/16/2021    RBC 4.83 05/24/2012    HGB 14.4 07/16/2021    HCT 44.2 07/16/2021    MCV 83.9 07/16/2021    MCH 27.3 07/16/2021    MCHC 32.6 07/16/2021    RDW 15.3 07/16/2021     07/16/2021     05/24/2012    MPV 11.2 07/16/2021     Lab Results   Component Value Date    TSH 0.12 07/16/2021     Lab Results   Component Value Date    CHOL 242 07/16/2021    HDL 34 07/16/2021    LABA1C 6.3 12/28/2021       Assessment/Plan:      Diagnosis Orders   1. Controlled type 2 diabetes mellitus with complication, without long-term current use of insulin (HCC)  POCT glycosylated hemoglobin (Hb A1C)    SITagliptin (JANUVIA) 100 MG tablet   2. Dyslipidemia     3. Essential hypertension     4. Stress reaction  venlafaxine (EFFEXOR XR) 37.5 MG extended release capsule   5. Severe obesity (BMI 35.0-39. 9) with comorbidity ([address]     6. Noncompliance with therapeutic plan       We will continue all current medications and refill her medication she is missing. A1c is stable. Unfortunately she failed to have laboratory studies done before the visit she will do now. We will follow with results. We will see her back in 6 months with repeat visit, sooner if any issue.       1.  Cyndy received counseling on the following healthy behaviors: nutrition, exercise and medication adherence  2. Patient given educational materials - see patient instructions  3. Was a self-tracking handout given in paper form or via [employer]? No  If yes, see orders or list here. 4.  Discussed use, benefit, and side effects of prescribed medications. Barriers to medication compliance addressed. All patient questions answered. Pt voiced understanding. 5.  Reviewed prior labs and health maintenance  6. Continue current medications, diet and exercise. Completed Refills   Requested Prescriptions     Signed Prescriptions Disp Refills    SITagliptin (JANUVIA) 100 MG tablet 90 tablet 3     Sig: TAKE ONE TABLET BY MOUTH DAILY    venlafaxine (EFFEXOR XR) 37.5 MG extended release capsule 90 capsule 3     Sig: Take 1 capsule by mouth daily         Return in about 6 months (around 12/28/2022) for Check up- A1c in office.

## 2022-06-28 NOTE — LETTER
[facility]  [address]  [place] [address] [facility]  Phone: [phone]  Fax: [address] [facility], CYRUS - CNP         June 28, 2022     Patient: Mahamed Portillo   YOB: 1955   Date of Visit: 6/28/2022       To Whom It May Concern: It is my medical opinion that Levi Siegel requires a disability parking placard for the following reasons:  She cannot walk 200 feet without stopping to rest.  Duration of need: 5 years    If you have any questions or concerns, please don't hesitate to call.     Sincerely,        Yoli Johnson, APRN - CNP

## 2022-06-28 NOTE — PATIENT INSTRUCTIONS
SURVEY:     You may be receiving a survey from [employer] regarding your visit today. Please complete the survey to enable us to provide the highest quality of care to you and your family. If you cannot score us a very good on any question, please call the office to discuss how we could have made your experience a very good one. Thank you. Ioana Johnson, APRN-CNP  Chiniak Jesuscatrachito, CNP  Weston Gardner, LPN  Dwayne Grace, CMA  Andrew, CMA  Stephanie, CMA  Jazmin, PCA  Swathi, PM    Patient Education        Counting Carbohydrates for Diabetes: Care Instructions  Overview     Managing the amount of carbohydrate (carbs) you eat is an important part of planning healthy meals when you have diabetes. Carbs raise blood sugar more than any other nutrient. Carbs are found in grains, starchy vegetables, fruits,and milk and yogurt. Carbs are also found in sugar-sweetened foods and drinks. The more carbs you eat at one time, the higher your blood sugar will rise. Counting carbs can help you keep your blood sugar within your target range. If you use insulin, counting carbs helps you match the right amount of insulinto the number of grams of carbs in a meal.  A registered dietitian or diabetes educator can help you plan meals and snacks. Follow-up care is a key part of your treatment and safety. Be sure to make and go to all appointments, and call your doctor if you are having problems. It's also a good idea to know your test results and keep alist of the medicines you take. How can you care for yourself at home? Know your daily amount of carbohydrates  Your daily amount depends on several things, such as your weight, how active you are, which diabetes medicines you take, and what your goals are for your blood sugar levels. A registered dietitian or diabetes educator can help youplan how many carbs to include in each meal and snack.   For most adults, a guideline for the daily amount of carbs is:   45 to 60 grams at each meal. That's about the same as 3 to 4 carbohydrate servings.  15 to 20 grams at each snack. That's about the same as 1 carbohydrate serving. Count carbs  Counting carbs lets you know how much rapid-acting insulin to take before you eat. If you use an insulin pump, you get a constant rate of insulin during the day. So the pump must be programmed at meals. This gives you extra insulin tocover the rise in blood sugar after meals. If you take insulin:   Learn your own insulin-to-carb ratio. You and your diabetes health professional will figure out the ratio. You can do this by testing your blood sugar after meals. For example, you may need a certain amount of insulin for every 15 grams of carbs.  Add up the carb grams in a meal. Then you can figure out how many units of insulin to take based on your insulin-to-carb ratio.  Exercise lowers blood sugar. You can use less insulin than you would if you were not doing exercise. Keep in mind that timing matters. If you exercise within 1 hour after a meal, your body may need less insulin for that meal than it would if you exercised 3 hours after the meal. Test your blood sugar to find out how exercise affects your need for insulin. If you do or don't take insulin:   Look at labels on packaged foods. This can tell you how many carbs are in a serving.  Be aware of portions, or serving sizes. If a package has two servings and you eat the whole package, you need to double the number of grams of carbohydrate listed for one serving.  Protein, fat, and fiber do not raise blood sugar as much as carbs do. If you eat a lot of these nutrients in a meal, your blood sugar will rise more slowly than it would otherwise. Where can you learn more? Go to https://Naurextl.MustHaveMenus. org and sign in to your [employer] account. Enter R240 in the [facility] box to learn more about \"Counting Carbohydrates for Diabetes: Care Instructions. \"     If you do not have an account, please click on the \"Sign Up Now\" link. Current as of: July 28, 2021               Content Version: 13.3  © 2006-2022 Healthwise, Incorporated. Care instructions adapted under license by [facility] [place] ([facility]). If you have questions about a medical condition or this instruction, always ask your healthcare professional. [address] any warranty or liability for your use of this information.

## 2022-07-08 DIAGNOSIS — F43.0 STRESS REACTION: ICD-10-CM

## 2022-07-08 DIAGNOSIS — G89.29 CHRONIC BILATERAL LOW BACK PAIN WITH LEFT-SIDED SCIATICA: ICD-10-CM

## 2022-07-08 DIAGNOSIS — M54.42 CHRONIC BILATERAL LOW BACK PAIN WITH LEFT-SIDED SCIATICA: ICD-10-CM

## 2022-07-08 DIAGNOSIS — G89.29 CHRONIC NECK PAIN: ICD-10-CM

## 2022-07-08 DIAGNOSIS — M54.2 CHRONIC NECK PAIN: ICD-10-CM

## 2022-07-08 RX ORDER — HYDROXYZINE HYDROCHLORIDE 25 MG/1
TABLET, FILM COATED ORAL
Qty: 270 TABLET | Refills: 0 | Status: SHIPPED | OUTPATIENT
Start: 2022-07-08

## 2022-07-08 RX ORDER — GABAPENTIN 300 MG/1
CAPSULE ORAL
Qty: 90 CAPSULE | Refills: 2 | OUTPATIENT
Start: 2022-07-08

## 2022-07-08 NOTE — TELEPHONE ENCOUNTER
Patient is having a lot of stress. Is requesting a refill. Health Maintenance   Topic Date Due    Annual Wellness Visit (AWV)  11/19/2021    Colorectal Cancer Screen  12/28/2021    Diabetic retinal exam  07/08/2022 (Originally 11/6/2013)    Shingles vaccine (3 of 3) 12/28/2022 (Originally 10/13/2021)    Pneumococcal 65+ years Vaccine (2 - PCV) 12/28/2022 (Originally 7/8/2017)    Diabetic foot exam  06/28/2023 (Originally 6/28/2022)    DEXA (modify frequency per FRAX score)  06/28/2023 (Originally 1/2/2009)    COVID-19 Vaccine (1) 06/28/2023 (Originally 1/2/1960)    Flu vaccine (1) 09/01/2022    Low dose CT lung screening  02/25/2023    Breast cancer screen  03/19/2023    A1C test (Diabetic or Prediabetic)  06/28/2023    Diabetic microalbuminuria test  06/28/2023    Lipids  06/28/2023    Depression Screen  06/28/2023    DTaP/Tdap/Td vaccine (2 - Td or Tdap) 06/28/2031    Hepatitis C screen  Completed    Hepatitis A vaccine  Aged Out    Hib vaccine  Aged Out    Meningococcal (ACWY) vaccine  Aged Out             (applicable per patient's age: Cancer Screenings, Depression Screening, Fall Risk Screening, Immunizations)    Hemoglobin A1C (%)   Date Value   06/28/2022 6.5   12/28/2021 6.3   02/19/2021 7.0 (H)     Microalb/Crt.  Ratio (mcg/mg creat)   Date Value   06/28/2022 Can not be calculated     LDL Cholesterol (mg/dL)   Date Value   06/28/2022 141 (H)     AST (U/L)   Date Value   06/28/2022 13     ALT (U/L)   Date Value   06/28/2022 14     BUN (mg/dL)   Date Value   06/28/2022 8      (goal A1C is < 7)   (goal LDL is <100) need 30-50% reduction from baseline     BP Readings from Last 3 Encounters:   06/28/22 128/74   04/13/22 120/80   01/13/22 132/72    (goal /80)      All Future Testing planned in [facility]:  Lab Frequency Next Occurrence   CT lung screen [Initial/Annual] Once 01/25/2022   POCT Fecal Immunochemical Test (FIT) Once 04/28/2022       Next Visit Date:  Future

## 2022-07-15 DIAGNOSIS — G89.29 CHRONIC BILATERAL LOW BACK PAIN WITH LEFT-SIDED SCIATICA: ICD-10-CM

## 2022-07-15 DIAGNOSIS — M54.2 CHRONIC NECK PAIN: ICD-10-CM

## 2022-07-15 DIAGNOSIS — M54.42 CHRONIC BILATERAL LOW BACK PAIN WITH LEFT-SIDED SCIATICA: ICD-10-CM

## 2022-07-15 DIAGNOSIS — G89.29 CHRONIC NECK PAIN: ICD-10-CM

## 2022-07-15 RX ORDER — GABAPENTIN 300 MG/1
CAPSULE ORAL
Qty: 90 CAPSULE | Refills: 2 | Status: SHIPPED | OUTPATIENT
Start: 2022-07-15 | End: 2022-10-15

## 2022-07-15 NOTE — TELEPHONE ENCOUNTER
Health Maintenance   Topic Date Due    Diabetic retinal exam  11/06/2013    Annual Wellness Visit (AWV)  11/19/2021    Colorectal Cancer Screen  12/28/2021    Shingles vaccine (3 of 3) 12/28/2022 (Originally 10/13/2021)    Pneumococcal 65+ years Vaccine (2 - PCV) 12/28/2022 (Originally 7/8/2017)    Diabetic foot exam  06/28/2023 (Originally 6/28/2022)    DEXA (modify frequency per FRAX score)  06/28/2023 (Originally 1/2/2009)    COVID-19 Vaccine (1) 06/28/2023 (Originally 1955)    Flu vaccine (1) 09/01/2022    Low dose CT lung screening  02/25/2023    Breast cancer screen  03/19/2023    A1C test (Diabetic or Prediabetic)  06/28/2023    Diabetic microalbuminuria test  06/28/2023    Lipids  06/28/2023    Depression Screen  06/28/2023    DTaP/Tdap/Td vaccine (2 - Td or Tdap) 06/28/2031    Hepatitis C screen  Completed    Hepatitis A vaccine  Aged Out    Hib vaccine  Aged Out    Meningococcal (ACWY) vaccine  Aged Out             (applicable per patient's age: Cancer Screenings, Depression Screening, Fall Risk Screening, Immunizations)    Hemoglobin A1C (%)   Date Value   06/28/2022 6.5   12/28/2021 6.3   02/19/2021 7.0 (H)     Microalb/Crt.  Ratio (mcg/mg creat)   Date Value   06/28/2022 Can not be calculated     LDL Cholesterol (mg/dL)   Date Value   06/28/2022 141 (H)     AST (U/L)   Date Value   06/28/2022 13     ALT (U/L)   Date Value   06/28/2022 14     BUN (mg/dL)   Date Value   06/28/2022 8      (goal A1C is < 7)   (goal LDL is <100) need 30-50% reduction from baseline     BP Readings from Last 3 Encounters:   06/28/22 128/74   04/13/22 120/80   01/13/22 132/72    (goal /80)      All Future Testing planned in [facility]:  Lab Frequency Next Occurrence   CT lung screen [Initial/Annual] Once 01/25/2022   POCT Fecal Immunochemical Test (FIT) Once 04/28/2022       Next Visit Date:  Future Appointments   Date Time Provider Christopher Petersen   12/28/2022  9:00 AM CYRUS Blanco - CNP [facility] 4/19/2023 10:00 AM Alonzo Patton MD [facility]            Patient Active Problem List:     Dermatophytosis of foot     Controlled type 2 diabetes mellitus with complication, without long-term current use of insulin (HCC)     Dyslipidemia     Hypothyroid     Obesity (BMI 30-39. 9)     Essential hypertension     Noncompliance with therapeutic plan     Tobacco abuse     Positive FIT (fecal immunochemical test)     Morbidly obese (HCC)     Hypoxia     Atrial fibrillation with RVR (HCC)     Aspiration pneumonia (HCC)     S/P arthroscopic partial medial meniscectomy -- right knee     Moderate mitral stenosis by prior echocardiogram     Moderate aortic stenosis by prior echocardiogram     Stress reaction

## 2022-10-09 DIAGNOSIS — E11.8 CONTROLLED TYPE 2 DIABETES MELLITUS WITH COMPLICATION, WITHOUT LONG-TERM CURRENT USE OF INSULIN (HCC): ICD-10-CM

## 2022-10-10 RX ORDER — LANCETS 33 GAUGE
EACH MISCELLANEOUS
Qty: 100 EACH | Refills: 1 | Status: SHIPPED | OUTPATIENT
Start: 2022-10-10

## 2022-10-10 NOTE — TELEPHONE ENCOUNTER
Health Maintenance   Topic Date Due    Diabetic retinal exam  11/06/2013    Annual Wellness Visit (AWV)  11/19/2021    Colorectal Cancer Screen  12/28/2021    Flu vaccine (1) Never done    Shingles vaccine (3 of 3) 12/28/2022 (Originally 10/13/2021)    Diabetic foot exam  06/28/2023 (Originally 6/28/2022)    DEXA (modify frequency per FRAX score)  06/28/2023 (Originally 1/2/2009)    COVID-19 Vaccine (1) 06/28/2023 (Originally 1955)    Low dose CT lung screening  02/25/2023    Breast cancer screen  03/19/2023    A1C test (Diabetic or Prediabetic)  06/28/2023    Diabetic microalbuminuria test  06/28/2023    Lipids  06/28/2023    Depression Screen  06/28/2023    DTaP/Tdap/Td vaccine (2 - Td or Tdap) 06/28/2031    Pneumococcal 65+ years Vaccine  Completed    Hepatitis C screen  Completed    Hepatitis A vaccine  Aged Out    Hib vaccine  Aged Out    Meningococcal (ACWY) vaccine  Aged Out             (applicable per patient's age: Cancer Screenings, Depression Screening, Fall Risk Screening, Immunizations)    Hemoglobin A1C (%)   Date Value   06/28/2022 6.5   12/28/2021 6.3   02/19/2021 7.0 (H)     Microalb/Crt.  Ratio (mcg/mg creat)   Date Value   06/28/2022 Can not be calculated     LDL Cholesterol (mg/dL)   Date Value   06/28/2022 141 (H)     AST (U/L)   Date Value   06/28/2022 13     ALT (U/L)   Date Value   06/28/2022 14     BUN (mg/dL)   Date Value   06/28/2022 8      (goal A1C is < 7)   (goal LDL is <100) need 30-50% reduction from baseline     BP Readings from Last 3 Encounters:   06/28/22 128/74   04/13/22 120/80   01/13/22 132/72    (goal /80)      All Future Testing planned in [facility]:  Lab Frequency Next Occurrence   CT lung screen [Initial/Annual] Once 01/25/2022   POCT Fecal Immunochemical Test (FIT) Once 04/28/2022       Next Visit Date:  Future Appointments   Date Time Provider Christopher Petersen   12/28/2022  9:00 AM Yaakov Johnson APRN - CNP [facility]   4/19/2023 10:00 AM Rell Staley, MD [facility]            Patient Active Problem List:     Dermatophytosis of foot     Controlled type 2 diabetes mellitus with complication, without long-term current use of insulin (HCC)     Dyslipidemia     Hypothyroid     Obesity (BMI 30-39. 9)     Essential hypertension     Noncompliance with therapeutic plan     Tobacco abuse     Positive FIT (fecal immunochemical test)     Morbidly obese (HCC)     Hypoxia     Atrial fibrillation with RVR ([facility])     Aspiration pneumonia ([facility])     S/P arthroscopic partial medial meniscectomy -- right knee     Moderate mitral stenosis by prior echocardiogram     Moderate aortic stenosis by prior echocardiogram     Stress reaction

## 2022-12-16 DIAGNOSIS — G89.29 CHRONIC BILATERAL LOW BACK PAIN WITH LEFT-SIDED SCIATICA: ICD-10-CM

## 2022-12-16 DIAGNOSIS — M54.2 CHRONIC NECK PAIN: ICD-10-CM

## 2022-12-16 DIAGNOSIS — G89.29 CHRONIC NECK PAIN: ICD-10-CM

## 2022-12-16 DIAGNOSIS — M54.42 CHRONIC BILATERAL LOW BACK PAIN WITH LEFT-SIDED SCIATICA: ICD-10-CM

## 2022-12-16 RX ORDER — GABAPENTIN 300 MG/1
CAPSULE ORAL
Qty: 90 CAPSULE | Refills: 2 | Status: SHIPPED | OUTPATIENT
Start: 2022-12-16 | End: 2023-03-16

## 2022-12-16 NOTE — TELEPHONE ENCOUNTER
Health Maintenance   Topic Date Due    Diabetic retinal exam  11/06/2013    Annual Wellness Visit (AWV)  11/19/2021    Colorectal Cancer Screen  12/28/2021    Flu vaccine (1) Never done    Shingles vaccine (3 of 3) 12/28/2022 (Originally 10/13/2021)    Diabetic foot exam  06/28/2023 (Originally 6/28/2022)    DEXA (modify frequency per FRAX score)  06/28/2023 (Originally 1/2/2009)    COVID-19 Vaccine (1) 06/28/2023 (Originally 1955)    Low dose CT lung screening  02/25/2023    Breast cancer screen  03/19/2023    A1C test (Diabetic or Prediabetic)  06/28/2023    Diabetic microalbuminuria test  06/28/2023    Lipids  06/28/2023    Depression Screen  06/28/2023    DTaP/Tdap/Td vaccine (2 - Td or Tdap) 06/28/2031    Pneumococcal 65+ years Vaccine  Completed    Hepatitis C screen  Completed    Hepatitis A vaccine  Aged Out    Hib vaccine  Aged Out    Meningococcal (ACWY) vaccine  Aged Out             (applicable per patient's age: Cancer Screenings, Depression Screening, Fall Risk Screening, Immunizations)    Hemoglobin A1C (%)   Date Value   06/28/2022 6.5   12/28/2021 6.3   02/19/2021 7.0 (H)     Microalb/Crt.  Ratio (mcg/mg creat)   Date Value   06/28/2022 Can not be calculated     LDL Cholesterol (mg/dL)   Date Value   06/28/2022 141 (H)     AST (U/L)   Date Value   06/28/2022 13     ALT (U/L)   Date Value   06/28/2022 14     BUN (mg/dL)   Date Value   06/28/2022 8      (goal A1C is < 7)   (goal LDL is <100) need 30-50% reduction from baseline     BP Readings from Last 3 Encounters:   06/28/22 128/74   04/13/22 120/80   01/13/22 132/72    (goal /80)      All Future Testing planned in [facility]:  Lab Frequency Next Occurrence   CT lung screen [Initial/Annual] Once 01/25/2022   POCT Fecal Immunochemical Test (FIT) Once 04/28/2022       Next Visit Date:  Future Appointments   Date Time Provider Christopher Petersen   12/28/2022  9:00 AM CYRUS Kidd - [facility]   4/19/2023 10:00 AM Elan Rizzo, MD [facility]            Patient Active Problem List:     Dermatophytosis of foot     Controlled type 2 diabetes mellitus with complication, without long-term current use of insulin (HCC)     Dyslipidemia     Hypothyroid     Obesity (BMI 30-39. 9)     Essential hypertension     Noncompliance with therapeutic plan     Tobacco abuse     Positive FIT (fecal immunochemical test)     Morbidly obese (HCC)     Hypoxia     Atrial fibrillation with RVR ([facility])     Aspiration pneumonia ([facility])     S/P arthroscopic partial medial meniscectomy -- right knee     Moderate mitral stenosis by prior echocardiogram     Moderate aortic stenosis by prior echocardiogram     Stress reaction

## 2023-01-23 DIAGNOSIS — E03.1 CONGENITAL HYPOTHYROIDISM WITHOUT GOITER: ICD-10-CM

## 2023-01-23 RX ORDER — LEVOTHYROXINE SODIUM 175 UG/1
TABLET ORAL
Qty: 90 TABLET | Refills: 3 | OUTPATIENT
Start: 2023-01-23

## 2023-01-26 ENCOUNTER — TELEPHONE (OUTPATIENT)
Dept: ONCOLOGY | Age: 68
End: 2023-01-26

## 2023-01-26 DIAGNOSIS — Z87.891 PERSONAL HISTORY OF NICOTINE DEPENDENCE: Primary | ICD-10-CM

## 2023-01-26 NOTE — TELEPHONE ENCOUNTER
Our records indicate that your patient is coming due for their annual lung cancer screening follow up testing. For your convenience, we have pended the order for the scan for you. If you do not agree with the need for the test, please cancel the order and let us know. Sincerely,    [address] [place] [address] Screening Program    Auto printed reminder letter sent to patient.

## 2023-03-12 DIAGNOSIS — E03.1 CONGENITAL HYPOTHYROIDISM WITHOUT GOITER: ICD-10-CM

## 2023-03-12 DIAGNOSIS — E11.9 CONTROLLED TYPE 2 DIABETES MELLITUS WITHOUT COMPLICATION, WITHOUT LONG-TERM CURRENT USE OF INSULIN (HCC): ICD-10-CM

## 2023-03-12 RX ORDER — METFORMIN HYDROCHLORIDE 500 MG/1
TABLET, EXTENDED RELEASE ORAL
Qty: 180 TABLET | Refills: 3 | Status: SHIPPED | OUTPATIENT
Start: 2023-03-12

## 2023-03-12 RX ORDER — LEVOTHYROXINE SODIUM 175 UG/1
TABLET ORAL
Qty: 90 TABLET | Refills: 3 | Status: SHIPPED | OUTPATIENT
Start: 2023-03-12

## 2023-03-12 RX ORDER — LISINOPRIL 2.5 MG/1
TABLET ORAL
Qty: 90 TABLET | Refills: 3 | Status: SHIPPED | OUTPATIENT
Start: 2023-03-12

## 2023-03-23 ENCOUNTER — OFFICE VISIT (OUTPATIENT)
Dept: PRIMARY CARE CLINIC | Age: 68
End: 2023-03-23
Payer: MEDICARE

## 2023-03-23 VITALS
DIASTOLIC BLOOD PRESSURE: 70 MMHG | WEIGHT: 201.1 LBS | BODY MASS INDEX: 33.46 KG/M2 | TEMPERATURE: 97.7 F | HEART RATE: 62 BPM | OXYGEN SATURATION: 97 % | SYSTOLIC BLOOD PRESSURE: 124 MMHG | RESPIRATION RATE: 20 BRPM

## 2023-03-23 DIAGNOSIS — E03.1 CONGENITAL HYPOTHYROIDISM WITHOUT GOITER: ICD-10-CM

## 2023-03-23 DIAGNOSIS — E11.8 CONTROLLED TYPE 2 DIABETES MELLITUS WITH COMPLICATION, WITHOUT LONG-TERM CURRENT USE OF INSULIN (HCC): Primary | ICD-10-CM

## 2023-03-23 DIAGNOSIS — E78.5 DYSLIPIDEMIA: ICD-10-CM

## 2023-03-23 DIAGNOSIS — I48.0 PAF (PAROXYSMAL ATRIAL FIBRILLATION) (HCC): ICD-10-CM

## 2023-03-23 DIAGNOSIS — Z12.31 OTHER SCREENING MAMMOGRAM: ICD-10-CM

## 2023-03-23 DIAGNOSIS — Z12.11 SCREEN FOR COLON CANCER: ICD-10-CM

## 2023-03-23 LAB — HBA1C MFR BLD: 6.1 %

## 2023-03-23 PROCEDURE — 99214 OFFICE O/P EST MOD 30 MIN: CPT | Performed by: NURSE PRACTITIONER

## 2023-03-23 PROCEDURE — G8417 CALC BMI ABV UP PARAM F/U: HCPCS | Performed by: NURSE PRACTITIONER

## 2023-03-23 PROCEDURE — G8484 FLU IMMUNIZE NO ADMIN: HCPCS | Performed by: NURSE PRACTITIONER

## 2023-03-23 PROCEDURE — 83036 HEMOGLOBIN GLYCOSYLATED A1C: CPT | Performed by: NURSE PRACTITIONER

## 2023-03-23 PROCEDURE — G8427 DOCREV CUR MEDS BY ELIG CLIN: HCPCS | Performed by: NURSE PRACTITIONER

## 2023-03-23 PROCEDURE — 4004F PT TOBACCO SCREEN RCVD TLK: CPT | Performed by: NURSE PRACTITIONER

## 2023-03-23 PROCEDURE — G8400 PT W/DXA NO RESULTS DOC: HCPCS | Performed by: NURSE PRACTITIONER

## 2023-03-23 PROCEDURE — 3078F DIAST BP <80 MM HG: CPT | Performed by: NURSE PRACTITIONER

## 2023-03-23 PROCEDURE — 3017F COLORECTAL CA SCREEN DOC REV: CPT | Performed by: NURSE PRACTITIONER

## 2023-03-23 PROCEDURE — 1123F ACP DISCUSS/DSCN MKR DOCD: CPT | Performed by: NURSE PRACTITIONER

## 2023-03-23 PROCEDURE — 1090F PRES/ABSN URINE INCON ASSESS: CPT | Performed by: NURSE PRACTITIONER

## 2023-03-23 PROCEDURE — 3044F HG A1C LEVEL LT 7.0%: CPT | Performed by: NURSE PRACTITIONER

## 2023-03-23 PROCEDURE — 3074F SYST BP LT 130 MM HG: CPT | Performed by: NURSE PRACTITIONER

## 2023-03-23 PROCEDURE — 2022F DILAT RTA XM EVC RTNOPTHY: CPT | Performed by: NURSE PRACTITIONER

## 2023-03-23 RX ORDER — HYDROXYZINE HYDROCHLORIDE 25 MG/1
TABLET, FILM COATED ORAL
Qty: 270 TABLET | Refills: 0 | Status: SHIPPED | OUTPATIENT
Start: 2023-03-23

## 2023-03-23 SDOH — ECONOMIC STABILITY: HOUSING INSECURITY
IN THE LAST 12 MONTHS, WAS THERE A TIME WHEN YOU DID NOT HAVE A STEADY PLACE TO SLEEP OR SLEPT IN A SHELTER (INCLUDING NOW)?: PATIENT REFUSED

## 2023-03-23 SDOH — ECONOMIC STABILITY: FOOD INSECURITY: WITHIN THE PAST 12 MONTHS, THE FOOD YOU BOUGHT JUST DIDN'T LAST AND YOU DIDN'T HAVE MONEY TO GET MORE.: PATIENT DECLINED

## 2023-03-23 SDOH — ECONOMIC STABILITY: FOOD INSECURITY: WITHIN THE PAST 12 MONTHS, YOU WORRIED THAT YOUR FOOD WOULD RUN OUT BEFORE YOU GOT MONEY TO BUY MORE.: PATIENT DECLINED

## 2023-03-23 SDOH — ECONOMIC STABILITY: INCOME INSECURITY: HOW HARD IS IT FOR YOU TO PAY FOR THE VERY BASICS LIKE FOOD, HOUSING, MEDICAL CARE, AND HEATING?: PATIENT DECLINED

## 2023-03-23 ASSESSMENT — PATIENT HEALTH QUESTIONNAIRE - PHQ9
SUM OF ALL RESPONSES TO PHQ QUESTIONS 1-9: 0
2. FEELING DOWN, DEPRESSED OR HOPELESS: 0
1. LITTLE INTEREST OR PLEASURE IN DOING THINGS: 0
SUM OF ALL RESPONSES TO PHQ9 QUESTIONS 1 & 2: 0
SUM OF ALL RESPONSES TO PHQ QUESTIONS 1-9: 0

## 2023-03-23 ASSESSMENT — ENCOUNTER SYMPTOMS
DIARRHEA: 0
VISUAL CHANGE: 0
RHINORRHEA: 0
TROUBLE SWALLOWING: 0
PHOTOPHOBIA: 0
WHEEZING: 0
CONSTIPATION: 0
COUGH: 0
BLURRED VISION: 0
NAUSEA: 0
HOARSE VOICE: 0
HAIR LOSS: 0
SHORTNESS OF BREATH: 0
SORE THROAT: 0
VOMITING: 0

## 2023-03-23 NOTE — PROGRESS NOTES
OR    HEEL SPUR SURGERY      right heel    KNEE ARTHROSCOPY Right 03/02/2021    partial medial meniscetomy    KNEE ARTHROSCOPY Right 3/2/2021    KNEE ARTHROSCOPY-WITH MEDIAL AND LATERAL partial MENISCECTOMY performed by John Shirley MD at [facility] N/A 4/6/2021    EPIDURAL STEROID INJECTION-LUMBAR L3-4 performed by Lorna Cavanaugh MD at [facility] Bilateral 6/8/2021    LUMBAR FACET, L4-5,L5-S1 performed by Lorna Cavanaugh MD at [facility] of back teeth    TUBAL LIGATION          Medications:       Prior to Admission medications    Medication Sig Start Date End Date Taking?  Authorizing Provider   hydrOXYzine HCl (ATARAX) 25 MG tablet TAKE ONE TABLET BY MOUTH EVERY 8 HOURS AS NEEDED FOR ANXIETY 3/23/23  Yes Clora Shames Might, APRN - CNP   lisinopril (PRINIVIL;ZESTRIL) 2.5 MG tablet TAKE ONE TABLET BY MOUTH DAILY 3/12/23  Yes Clora Shames Might, APRN - CNP   metFORMIN (GLUCOPHAGE-XR) 500 MG extended release tablet TAKE TWO TABLETS BY MOUTH DAILY WITH BREAKFAST 3/12/23  Yes Clora Shames Might, APRN - CNP   levothyroxine (SYNTHROID) 175 MCG tablet TAKE ONE TABLET BY MOUTH DAILY 3/12/23  Yes Clora Shames Might, APRN - CNP   gabapentin (NEURONTIN) 300 MG capsule TAKE ONE CAPSULE BY MOUTH THREE TIMES A DAY 12/16/22 3/23/23 Yes Clora Shames Might, APRN - CNP   SITagliptin (JANUVIA) 100 MG tablet TAKE ONE TABLET BY MOUTH DAILY 6/28/22  Yes Clora Shames Might, APRN - CNP   venlafaxine (EFFEXOR XR) 37.5 MG extended release capsule Take 1 capsule by mouth daily 6/28/22  Yes Clora Shames Might, APRN - CNP   atorvastatin (LIPITOR) 80 MG tablet TAKE ONE TABLET BY MOUTH DAILY 6/28/22  Yes Clora Shames Might, APRN - CNP   metoprolol tartrate (LOPRESSOR) 25 MG tablet TAKE 1/2 TABLET BY MOUTH TWICE A DAY 6/27/22  Yes Clora Shames Might, APRN - CNP   albuterol sulfate HFA (PROAIR HFA) 108 (90 Base) MCG/ACT inhaler Inhale 2 puffs into the lungs every 4-6 hours as needed for Wheezing 8/31/21

## 2023-03-23 NOTE — PATIENT INSTRUCTIONS
SURVEY:     You may be receiving a survey from [employer] regarding your visit today. Please complete the survey to enable us to provide the highest quality of care to you and your family. If you cannot score us a very good on any question, please call the office to discuss how we could have made your experience a very good one.      Thank you,    Domonique Johnson, APRN-CNP  Breanna Moralez, APRN-CNP  Naomie Eckert, ALEXANDRA Lind, MONSERRAT Parker, MONSERRAT Brown, CMA  Jazmin, PCA  Swahti, PM

## 2023-03-25 DIAGNOSIS — G89.29 CHRONIC NECK PAIN: ICD-10-CM

## 2023-03-25 DIAGNOSIS — G89.29 CHRONIC BILATERAL LOW BACK PAIN WITH LEFT-SIDED SCIATICA: ICD-10-CM

## 2023-03-25 DIAGNOSIS — M54.42 CHRONIC BILATERAL LOW BACK PAIN WITH LEFT-SIDED SCIATICA: ICD-10-CM

## 2023-03-25 DIAGNOSIS — M54.2 CHRONIC NECK PAIN: ICD-10-CM

## 2023-03-26 RX ORDER — GABAPENTIN 300 MG/1
CAPSULE ORAL
Qty: 90 CAPSULE | Refills: 2 | Status: SHIPPED | OUTPATIENT
Start: 2023-03-26 | End: 2023-06-26

## 2023-04-19 ENCOUNTER — OFFICE VISIT (OUTPATIENT)
Dept: CARDIOLOGY | Age: 68
End: 2023-04-19
Payer: MEDICARE

## 2023-04-19 VITALS
WEIGHT: 197 LBS | SYSTOLIC BLOOD PRESSURE: 137 MMHG | DIASTOLIC BLOOD PRESSURE: 56 MMHG | HEART RATE: 66 BPM | RESPIRATION RATE: 18 BRPM | HEIGHT: 65 IN | OXYGEN SATURATION: 96 % | BODY MASS INDEX: 32.82 KG/M2

## 2023-04-19 DIAGNOSIS — I05.0 MODERATE MITRAL STENOSIS BY PRIOR ECHOCARDIOGRAM: ICD-10-CM

## 2023-04-19 DIAGNOSIS — I35.1 MODERATE AORTIC REGURGITATION: ICD-10-CM

## 2023-04-19 DIAGNOSIS — Z86.79 HISTORY OF ATRIAL FIBRILLATION: ICD-10-CM

## 2023-04-19 DIAGNOSIS — Z71.6 TOBACCO ABUSE COUNSELING: ICD-10-CM

## 2023-04-19 DIAGNOSIS — I35.0 MODERATE AORTIC STENOSIS BY PRIOR ECHOCARDIOGRAM: ICD-10-CM

## 2023-04-19 DIAGNOSIS — R06.02 SOB (SHORTNESS OF BREATH): Primary | ICD-10-CM

## 2023-04-19 DIAGNOSIS — I10 ESSENTIAL HYPERTENSION: ICD-10-CM

## 2023-04-19 PROCEDURE — 93000 ELECTROCARDIOGRAM COMPLETE: CPT | Performed by: FAMILY MEDICINE

## 2023-04-19 PROCEDURE — 1123F ACP DISCUSS/DSCN MKR DOCD: CPT | Performed by: FAMILY MEDICINE

## 2023-04-19 PROCEDURE — 4004F PT TOBACCO SCREEN RCVD TLK: CPT | Performed by: FAMILY MEDICINE

## 2023-04-19 PROCEDURE — 3074F SYST BP LT 130 MM HG: CPT | Performed by: FAMILY MEDICINE

## 2023-04-19 PROCEDURE — 99214 OFFICE O/P EST MOD 30 MIN: CPT | Performed by: FAMILY MEDICINE

## 2023-04-19 PROCEDURE — 3017F COLORECTAL CA SCREEN DOC REV: CPT | Performed by: FAMILY MEDICINE

## 2023-04-19 PROCEDURE — G8427 DOCREV CUR MEDS BY ELIG CLIN: HCPCS | Performed by: FAMILY MEDICINE

## 2023-04-19 PROCEDURE — G8417 CALC BMI ABV UP PARAM F/U: HCPCS | Performed by: FAMILY MEDICINE

## 2023-04-19 PROCEDURE — G8400 PT W/DXA NO RESULTS DOC: HCPCS | Performed by: FAMILY MEDICINE

## 2023-04-19 PROCEDURE — 1090F PRES/ABSN URINE INCON ASSESS: CPT | Performed by: FAMILY MEDICINE

## 2023-04-19 PROCEDURE — 3078F DIAST BP <80 MM HG: CPT | Performed by: FAMILY MEDICINE

## 2023-04-19 NOTE — PROGRESS NOTES
Sophy Sterling am scribing for and in the presence of Hamlet Corcoran MD, MS, F.A.C.C..    Patient: Claire Cruz  : 1955   Primary Care Physician: Lucrecia Johnson  Today's Date: 2023    Dear CYRUS Andersen - CNP,     HPI:  Ms. Ana Gasca is a 70 y.o. female who presents today to the office for chest pain that has been bothering her over the past couple of weeks. She previously had been electively admitted to the hospital for atrial fibrillation on 3/4/2021. She has been told in the past that she has a heart murmur. Family cardiac history includes her brother who had a heart attack in his 42's and he has stents. She is a smoker, starting at age 15 at one pack per day over the past 48 years. She has a past medical history significant for hypertension, hyperlipidemia, COPD, ANGE, and type 2 diabetes but denies any other cardiac history. Echocardiogram done on 3/2/2021 showed moderate mitral stenosis and moderate aortic stenosis. Cardiac event monitor done on 3/4/2021: Predominant rhythm: Normal sinus rhythm. Atrial tachycardia (AT) 6 episodes. Longest: 26 beats at average 108 bpm up to 135 bpm. Fastest: 18 beats at average 145 bpm up to 178 bpm. Premature atrial contractions 0.32%. Symptoms were reported but were associated with normal sinus rhythm. Clinical correlation required. Stress test done on 2022: Most likely normal myocardial perfusion imaging with soft tissue artifact, but without significant evidence of myocardial ischemia or infarction. Results are most consistent with a low risk for significant coronary artery disease. Echo done on 2022- EF 65%, the left ventricular wall thickness is moderately increased. The mean aortic valve gradient is 20 mmHg consistent with moderate aortic Stenosis. Mitral annular calcification is seen. Mild mitral stenosis. Mean gradient of 5.1 mm Hg. Mild mitral regurgitation    Ms. Ana Gasca is here today for a yearly follow up.  She states she is

## 2023-04-19 NOTE — PATIENT INSTRUCTIONS
SURVEY:    You may be receiving a survey from [employer] regarding your visit today. Please complete the survey to enable us to provide the highest quality of care to you and your family. If you cannot score us a very good on any question, please call the office to discuss how we could have made your experience a very good one. Thank you.

## 2023-04-20 LAB — NONINV COLON CA DNA+OCC BLD SCRN STL QL: NEGATIVE

## 2023-04-21 ENCOUNTER — TELEPHONE (OUTPATIENT)
Dept: PRIMARY CARE CLINIC | Age: 68
End: 2023-04-21

## 2023-04-21 NOTE — TELEPHONE ENCOUNTER
----- Message from [address] [place] [address], CYRUS - CNP sent at 4/21/2023  7:23 AM EDT -----  Results are normal, please call patient and make them aware.

## 2023-05-26 ENCOUNTER — TELEPHONE (OUTPATIENT)
Dept: PRIMARY CARE CLINIC | Age: 68
End: 2023-05-26

## 2023-05-26 ENCOUNTER — HOSPITAL ENCOUNTER (OUTPATIENT)
Dept: CT IMAGING | Age: 68
End: 2023-05-26
Payer: MEDICARE

## 2023-05-26 ENCOUNTER — HOSPITAL ENCOUNTER (OUTPATIENT)
Dept: WOMENS IMAGING | Age: 68
End: 2023-05-26
Payer: MEDICARE

## 2023-05-26 DIAGNOSIS — Z12.31 OTHER SCREENING MAMMOGRAM: ICD-10-CM

## 2023-05-26 DIAGNOSIS — Z87.891 PERSONAL HISTORY OF NICOTINE DEPENDENCE: ICD-10-CM

## 2023-05-26 PROCEDURE — 71271 CT THORAX LUNG CANCER SCR C-: CPT

## 2023-05-26 PROCEDURE — 77063 BREAST TOMOSYNTHESIS BI: CPT

## 2023-05-26 NOTE — TELEPHONE ENCOUNTER
----- Message from [address] [place] [address], CYRUS - CNP sent at 5/26/2023 12:33 PM EDT -----  Results are normal, please call patient and make them aware.

## 2023-05-31 ENCOUNTER — HOSPITAL ENCOUNTER (OUTPATIENT)
Dept: NON INVASIVE DIAGNOSTICS | Age: 68
Discharge: HOME | End: 2023-05-31
Payer: MEDICARE

## 2023-05-31 DIAGNOSIS — R06.02 SOB (SHORTNESS OF BREATH): ICD-10-CM

## 2023-05-31 DIAGNOSIS — I35.0 MODERATE AORTIC STENOSIS BY PRIOR ECHOCARDIOGRAM: ICD-10-CM

## 2023-05-31 DIAGNOSIS — I05.0 MODERATE MITRAL STENOSIS BY PRIOR ECHOCARDIOGRAM: ICD-10-CM

## 2023-05-31 DIAGNOSIS — I10 ESSENTIAL HYPERTENSION: ICD-10-CM

## 2023-05-31 DIAGNOSIS — Z71.6 TOBACCO ABUSE COUNSELING: ICD-10-CM

## 2023-05-31 DIAGNOSIS — Z86.79 HISTORY OF ATRIAL FIBRILLATION: ICD-10-CM

## 2023-05-31 LAB
LV EF: 65 %
LVEF MODALITY: NORMAL

## 2023-05-31 PROCEDURE — 93306 TTE W/DOPPLER COMPLETE: CPT

## 2023-06-01 ENCOUNTER — TELEPHONE (OUTPATIENT)
Dept: CARDIOLOGY | Age: 68
End: 2023-06-01

## 2023-06-01 NOTE — TELEPHONE ENCOUNTER
----- Message from Hoang Jay MD sent at 6/1/2023 12:22 AM EDT -----  Let Ms. Magdalen Bloch know their test result was ok. Will discuss at next visit. Thanks.

## 2023-06-05 ENCOUNTER — TELEPHONE (OUTPATIENT)
Dept: PRIMARY CARE CLINIC | Age: 68
End: 2023-06-05

## 2023-06-05 NOTE — TELEPHONE ENCOUNTER
----- Message from [address] [place] [address], CYRUS - CNP sent at 6/4/2023  1:06 PM EDT -----  Results are stable, continue yearly screening.

## 2023-07-12 DIAGNOSIS — E78.5 DYSLIPIDEMIA: ICD-10-CM

## 2023-07-12 DIAGNOSIS — F43.0 STRESS REACTION: ICD-10-CM

## 2023-07-12 DIAGNOSIS — E11.8 CONTROLLED TYPE 2 DIABETES MELLITUS WITH COMPLICATION, WITHOUT LONG-TERM CURRENT USE OF INSULIN (HCC): ICD-10-CM

## 2023-07-12 RX ORDER — VENLAFAXINE HYDROCHLORIDE 37.5 MG/1
CAPSULE, EXTENDED RELEASE ORAL
Qty: 90 CAPSULE | Refills: 3 | Status: SHIPPED | OUTPATIENT
Start: 2023-07-12

## 2023-07-12 RX ORDER — HYDROXYZINE HYDROCHLORIDE 25 MG/1
TABLET, FILM COATED ORAL
Qty: 270 TABLET | Refills: 0 | Status: SHIPPED | OUTPATIENT
Start: 2023-07-12

## 2023-07-12 RX ORDER — ATORVASTATIN CALCIUM 80 MG/1
TABLET, FILM COATED ORAL
Qty: 90 TABLET | Refills: 3 | Status: SHIPPED | OUTPATIENT
Start: 2023-07-12

## 2023-07-12 NOTE — TELEPHONE ENCOUNTER
Health Maintenance   Topic Date Due    COVID-19 Vaccine (1) Never done    DEXA (modify frequency per FRAX score)  Never done    Pneumococcal 65+ years Vaccine (2 - PCV) 07/08/2017    Annual Wellness Visit (AWV)  11/19/2021    Diabetic foot exam  06/28/2022    Diabetic Alb to Cr ratio (uACR) test  06/28/2023    Lipids  06/28/2023    GFR test (Diabetes, CKD 3-4, OR last GFR 15-59)  06/28/2023    Shingles vaccine (3 of 3) 03/23/2024 (Originally 10/13/2021)    Diabetic retinal exam  04/01/2024 (Originally 11/6/2013)    Flu vaccine (1) 08/01/2023    A1C test (Diabetic or Prediabetic)  03/23/2024    Depression Screen  03/23/2024    Low dose CT lung screening &/or counseling  05/26/2024    Breast cancer screen  05/26/2025    Colorectal Cancer Screen  04/12/2026    DTaP/Tdap/Td vaccine (2 - Td or Tdap) 06/28/2031    Hepatitis C screen  Completed    Hepatitis A vaccine  Aged Out    Hib vaccine  Aged Out    Meningococcal (ACWY) vaccine  Aged Out    Depression Monitoring  Discontinued             (applicable per patient's age: Cancer Screenings, Depression Screening, Fall Risk Screening, Immunizations)    Hemoglobin A1C (%)   Date Value   03/23/2023 6.1   06/28/2022 6.5   12/28/2021 6.3     Microalb/Crt.  Ratio (mcg/mg creat)   Date Value   06/28/2022 Can not be calculated     LDL Cholesterol (mg/dL)   Date Value   06/28/2022 141 (H)     AST (U/L)   Date Value   06/28/2022 13     ALT (U/L)   Date Value   06/28/2022 14     BUN (mg/dL)   Date Value   06/28/2022 8      (goal A1C is < 7)   (goal LDL is <100) need 30-50% reduction from baseline     BP Readings from Last 3 Encounters:   04/19/23 (!) 137/56   03/23/23 124/70   06/28/22 128/74    (goal /80)      All Future Testing planned in [facility]:  Lab Frequency Next Occurrence   T4, Free Once 09/19/2023   TSH Once 09/19/2023   CBC with Auto Differential Once 09/19/2023   ALT Once 09/23/2023   AST Once 09/23/2023   Hemoglobin A1C Once 09/19/2023   Lipid Panel Once 09/19/2023

## 2023-09-19 ENCOUNTER — TELEPHONE (OUTPATIENT)
Dept: PRIMARY CARE CLINIC | Age: 68
End: 2023-09-19

## 2023-09-25 ENCOUNTER — TELEPHONE (OUTPATIENT)
Dept: PRIMARY CARE CLINIC | Age: 68
End: 2023-09-25

## 2023-10-10 ENCOUNTER — TELEPHONE (OUTPATIENT)
Dept: PRIMARY CARE CLINIC | Age: 68
End: 2023-10-10

## 2023-10-14 ENCOUNTER — HOSPITAL ENCOUNTER (OUTPATIENT)
Age: 68
Discharge: HOME | End: 2023-10-14
Payer: MEDICARE

## 2023-10-14 DIAGNOSIS — E03.1 CONGENITAL HYPOTHYROIDISM WITHOUT GOITER: ICD-10-CM

## 2023-10-14 DIAGNOSIS — E11.8 CONTROLLED TYPE 2 DIABETES MELLITUS WITH COMPLICATION, WITHOUT LONG-TERM CURRENT USE OF INSULIN (HCC): ICD-10-CM

## 2023-10-14 LAB
ALT SERPL-CCNC: 16 U/L (ref 5–33)
ANION GAP SERPL CALCULATED.3IONS-SCNC: 10 MMOL/L (ref 9–17)
AST SERPL-CCNC: 13 U/L
BASOPHILS # BLD: 0.05 K/UL (ref 0–0.2)
BASOPHILS NFR BLD: 1 % (ref 0–2)
BUN SERPL-MCNC: 13 MG/DL (ref 8–23)
BUN/CREAT SERPL: 19 (ref 9–20)
CALCIUM SERPL-MCNC: 9.7 MG/DL (ref 8.6–10.4)
CHLORIDE SERPL-SCNC: 104 MMOL/L (ref 98–107)
CHOLEST SERPL-MCNC: 177 MG/DL
CHOLESTEROL/HDL RATIO: 4.3
CO2 SERPL-SCNC: 24 MMOL/L (ref 20–31)
CREAT SERPL-MCNC: 0.7 MG/DL (ref 0.5–0.9)
CREAT UR-MCNC: 61.1 MG/DL (ref 28–217)
EOSINOPHIL # BLD: 0.47 K/UL (ref 0–0.44)
EOSINOPHILS RELATIVE PERCENT: 6 % (ref 1–4)
ERYTHROCYTE [DISTWIDTH] IN BLOOD BY AUTOMATED COUNT: 13.5 % (ref 11.8–14.4)
GFR SERPL CREATININE-BSD FRML MDRD: >60 ML/MIN/1.73M2
GLUCOSE SERPL-MCNC: 125 MG/DL (ref 70–99)
HCT VFR BLD AUTO: 39.3 % (ref 36.3–47.1)
HDLC SERPL-MCNC: 41 MG/DL
HGB BLD-MCNC: 12.9 G/DL (ref 11.9–15.1)
IMM GRANULOCYTES # BLD AUTO: 0.03 K/UL (ref 0–0.3)
IMM GRANULOCYTES NFR BLD: 0 %
LDLC SERPL CALC-MCNC: 115 MG/DL (ref 0–130)
LYMPHOCYTES NFR BLD: 2.27 K/UL (ref 1.1–3.7)
LYMPHOCYTES RELATIVE PERCENT: 28 % (ref 24–43)
MCH RBC QN AUTO: 27.7 PG (ref 25.2–33.5)
MCHC RBC AUTO-ENTMCNC: 32.8 G/DL (ref 28.4–34.8)
MCV RBC AUTO: 84.3 FL (ref 82.6–102.9)
MICROALBUMIN UR-MCNC: <12 MG/L
MICROALBUMIN/CREAT UR-RTO: NORMAL MCG/MG CREAT
MONOCYTES NFR BLD: 0.77 K/UL (ref 0.1–1.2)
MONOCYTES NFR BLD: 10 % (ref 3–12)
NEUTROPHILS NFR BLD: 55 % (ref 36–65)
NEUTS SEG NFR BLD: 4.42 K/UL (ref 1.5–8.1)
NRBC BLD-RTO: 0 PER 100 WBC
PLATELET # BLD AUTO: 266 K/UL (ref 138–453)
PMV BLD AUTO: 9.3 FL (ref 8.1–13.5)
POTASSIUM SERPL-SCNC: 4.1 MMOL/L (ref 3.7–5.3)
RBC # BLD AUTO: 4.66 M/UL (ref 3.95–5.11)
SODIUM SERPL-SCNC: 138 MMOL/L (ref 135–144)
T4 FREE SERPL-MCNC: 1.7 NG/DL (ref 0.9–1.7)
TRIGL SERPL-MCNC: 106 MG/DL
TSH SERPL DL<=0.05 MIU/L-ACNC: 0.09 UIU/ML (ref 0.3–5)
WBC OTHER # BLD: 8 K/UL (ref 3.5–11.3)

## 2023-10-14 PROCEDURE — 82043 UR ALBUMIN QUANTITATIVE: CPT

## 2023-10-14 PROCEDURE — 36415 COLL VENOUS BLD VENIPUNCTURE: CPT

## 2023-10-14 PROCEDURE — 80061 LIPID PANEL: CPT

## 2023-10-14 PROCEDURE — 83036 HEMOGLOBIN GLYCOSYLATED A1C: CPT

## 2023-10-14 PROCEDURE — 84439 ASSAY OF FREE THYROXINE: CPT

## 2023-10-14 PROCEDURE — 85025 COMPLETE CBC W/AUTO DIFF WBC: CPT

## 2023-10-14 PROCEDURE — 84450 TRANSFERASE (AST) (SGOT): CPT

## 2023-10-14 PROCEDURE — 82570 ASSAY OF URINE CREATININE: CPT

## 2023-10-14 PROCEDURE — 84460 ALANINE AMINO (ALT) (SGPT): CPT

## 2023-10-14 PROCEDURE — 80048 BASIC METABOLIC PNL TOTAL CA: CPT

## 2023-10-14 PROCEDURE — 84443 ASSAY THYROID STIM HORMONE: CPT

## 2023-10-15 LAB
EST. AVERAGE GLUCOSE BLD GHB EST-MCNC: 134 MG/DL
HBA1C MFR BLD: 6.3 % (ref 4–6)

## 2023-10-17 ENCOUNTER — OFFICE VISIT (OUTPATIENT)
Dept: PRIMARY CARE CLINIC | Age: 68
End: 2023-10-17
Payer: MEDICARE

## 2023-10-17 VITALS
OXYGEN SATURATION: 97 % | WEIGHT: 215.3 LBS | SYSTOLIC BLOOD PRESSURE: 124 MMHG | HEART RATE: 72 BPM | RESPIRATION RATE: 22 BRPM | BODY MASS INDEX: 35.83 KG/M2 | DIASTOLIC BLOOD PRESSURE: 78 MMHG | TEMPERATURE: 98.5 F

## 2023-10-17 DIAGNOSIS — R32 FEMALE INCONTINENCE: ICD-10-CM

## 2023-10-17 DIAGNOSIS — E11.8 CONTROLLED TYPE 2 DIABETES MELLITUS WITH COMPLICATION, WITHOUT LONG-TERM CURRENT USE OF INSULIN (HCC): Primary | ICD-10-CM

## 2023-10-17 DIAGNOSIS — E03.1 CONGENITAL HYPOTHYROIDISM WITHOUT GOITER: ICD-10-CM

## 2023-10-17 PROCEDURE — 0509F URINE INCON PLAN DOCD: CPT | Performed by: NURSE PRACTITIONER

## 2023-10-17 PROCEDURE — G8417 CALC BMI ABV UP PARAM F/U: HCPCS | Performed by: NURSE PRACTITIONER

## 2023-10-17 PROCEDURE — 1036F TOBACCO NON-USER: CPT | Performed by: NURSE PRACTITIONER

## 2023-10-17 PROCEDURE — 3044F HG A1C LEVEL LT 7.0%: CPT | Performed by: NURSE PRACTITIONER

## 2023-10-17 PROCEDURE — G8400 PT W/DXA NO RESULTS DOC: HCPCS | Performed by: NURSE PRACTITIONER

## 2023-10-17 PROCEDURE — 3078F DIAST BP <80 MM HG: CPT | Performed by: NURSE PRACTITIONER

## 2023-10-17 PROCEDURE — 2022F DILAT RTA XM EVC RTNOPTHY: CPT | Performed by: NURSE PRACTITIONER

## 2023-10-17 PROCEDURE — G8427 DOCREV CUR MEDS BY ELIG CLIN: HCPCS | Performed by: NURSE PRACTITIONER

## 2023-10-17 PROCEDURE — 1123F ACP DISCUSS/DSCN MKR DOCD: CPT | Performed by: NURSE PRACTITIONER

## 2023-10-17 PROCEDURE — 99214 OFFICE O/P EST MOD 30 MIN: CPT | Performed by: NURSE PRACTITIONER

## 2023-10-17 PROCEDURE — 1090F PRES/ABSN URINE INCON ASSESS: CPT | Performed by: NURSE PRACTITIONER

## 2023-10-17 PROCEDURE — G8484 FLU IMMUNIZE NO ADMIN: HCPCS | Performed by: NURSE PRACTITIONER

## 2023-10-17 PROCEDURE — 3017F COLORECTAL CA SCREEN DOC REV: CPT | Performed by: NURSE PRACTITIONER

## 2023-10-17 PROCEDURE — 3074F SYST BP LT 130 MM HG: CPT | Performed by: NURSE PRACTITIONER

## 2023-10-17 RX ORDER — OXYBUTYNIN CHLORIDE 5 MG/1
5 TABLET, EXTENDED RELEASE ORAL DAILY
Qty: 90 TABLET | Refills: 1 | Status: SHIPPED | OUTPATIENT
Start: 2023-10-17

## 2023-10-17 SDOH — ECONOMIC STABILITY: FOOD INSECURITY: WITHIN THE PAST 12 MONTHS, THE FOOD YOU BOUGHT JUST DIDN'T LAST AND YOU DIDN'T HAVE MONEY TO GET MORE.: PATIENT DECLINED

## 2023-10-17 SDOH — ECONOMIC STABILITY: FOOD INSECURITY: WITHIN THE PAST 12 MONTHS, YOU WORRIED THAT YOUR FOOD WOULD RUN OUT BEFORE YOU GOT MONEY TO BUY MORE.: PATIENT DECLINED

## 2023-10-17 SDOH — ECONOMIC STABILITY: INCOME INSECURITY: HOW HARD IS IT FOR YOU TO PAY FOR THE VERY BASICS LIKE FOOD, HOUSING, MEDICAL CARE, AND HEATING?: PATIENT DECLINED

## 2023-10-17 ASSESSMENT — ENCOUNTER SYMPTOMS
TROUBLE SWALLOWING: 0
RHINORRHEA: 0
VOMITING: 0
WHEEZING: 0
NAUSEA: 0
HAIR LOSS: 0
COUGH: 0
BLURRED VISION: 0
VISUAL CHANGE: 0
SORE THROAT: 0
HOARSE VOICE: 0
CONSTIPATION: 0
DIARRHEA: 0
SHORTNESS OF BREATH: 0
PHOTOPHOBIA: 0

## 2023-10-17 ASSESSMENT — PATIENT HEALTH QUESTIONNAIRE - PHQ9
SUM OF ALL RESPONSES TO PHQ QUESTIONS 1-9: 0
SUM OF ALL RESPONSES TO PHQ QUESTIONS 1-9: 0
SUM OF ALL RESPONSES TO PHQ9 QUESTIONS 1 & 2: 0
1. LITTLE INTEREST OR PLEASURE IN DOING THINGS: 0
2. FEELING DOWN, DEPRESSED OR HOPELESS: 0
SUM OF ALL RESPONSES TO PHQ QUESTIONS 1-9: 0
SUM OF ALL RESPONSES TO PHQ QUESTIONS 1-9: 0

## 2023-10-17 NOTE — PATIENT INSTRUCTIONS
SURVEY:     You may be receiving a survey from [employer] regarding your visit today. Please complete the survey to enable us to provide the highest quality of care to you and your family. If you cannot score us a very good on any question, please call the office to discuss how we could have made your experience a very good one.      Thank you,    Janet Johnson, APRN-MU Ellis, APRN-CNP  Tamia Jim, ALEXANDRA Neff, MONSERRAT Francis, MONSERRAT Brown, CMA  Jazmin, BIMAL Echevarria, PM

## 2023-10-17 NOTE — PROGRESS NOTES
exam is current. Thyroid Problem  Presents for follow-up visit. Symptoms include anxiety. Patient reports no cold intolerance, constipation, depressed mood, diaphoresis, diarrhea, dry skin, fatigue, hair loss, heat intolerance, hoarse voice, leg swelling, menstrual problem, nail problem, palpitations, tremors, visual change, weight gain or weight loss. The symptoms have been stable. Her past medical history is significant for diabetes. Incontinence  This is a recurrent problem. The current episode started more than 1 month ago. The problem has been gradually worsening since onset. Aggravating factors include coughing. Bladder irritants consumed are caffeine and carbonated fluids. Incontinence occurs while awake. Frequency of incontinence is occasional. Incontinence negatively affects quality of life. Patient is aware of incontinence. Voiding frequency occurs every 2 hours. Protection used includes: pads. Patient's risk factors for incontinence are hysterectomy and urgency. Caffeine is consumed daily. Prior evaluation included none. Type of incontinence is dribbling. Drug therapy includes none.         Past Medical History:     Past Medical History:   Diagnosis Date    COPD (chronic obstructive pulmonary disease) ([address])     Hyperlipidemia     Hyperthyroidism     Hypothyroidism     Obesity     Osteoarthritis     Sleep apnea     Stress reaction 6/28/2022    Tobacco abuse     Type II or unspecified type diabetes mellitus without mention of complication, not stated as uncontrolled       Reviewed all health maintenance requirements and ordered appropriate tests  Health Maintenance Due   Topic Date Due    Annual Wellness Visit (AWV)  11/19/2021       Past Surgical History:     Past Surgical History:   Procedure Laterality Date    FACET JOINT INJECTION Left 4/13/2021    CERVICAL FACET JOINT-C3-C4, C4-C5,C5-C6 performed by Shekhar Brito MD at [address]      right heel    KNEE ARTHROSCOPY Right 03/02/2021

## 2024-04-12 ENCOUNTER — TELEPHONE (OUTPATIENT)
Dept: PRIMARY CARE CLINIC | Age: 69
End: 2024-04-12

## 2024-04-12 DIAGNOSIS — R32 FEMALE INCONTINENCE: ICD-10-CM

## 2024-04-12 RX ORDER — OXYBUTYNIN CHLORIDE 5 MG/1
5 TABLET, EXTENDED RELEASE ORAL DAILY
Qty: 90 TABLET | Refills: 1 | Status: SHIPPED | OUTPATIENT
Start: 2024-04-12

## 2024-04-12 NOTE — TELEPHONE ENCOUNTER
Once 04/14/2024   Hemoglobin A1C Once 04/14/2024   Basic Metabolic Panel Once 04/17/2024       Next Visit Date:  Future Appointments   Date Time Provider Department Center   4/18/2024  3:00 PM Jose Eduardo Johnson APRN - CNP [facility]   4/22/2024  9:40 AM [facility]            Patient Active Problem List:     Dermatophytosis of foot     Controlled type 2 diabetes mellitus with complication, without long-term current use of insulin ([facility])     Dyslipidemia     Hypothyroid     Obesity (BMI 30-39.9)     Essential hypertension     Noncompliance with therapeutic plan     Tobacco abuse     Positive FIT (fecal immunochemical test)     Morbidly obese (HCC)     Hypoxia     Atrial fibrillation with RVR ([facility])     Aspiration pneumonia (HCC)     S/P arthroscopic partial medial meniscectomy -- right knee     Moderate mitral stenosis by prior echocardiogram     Moderate aortic stenosis by prior echocardiogram     Stress reaction     PAF (paroxysmal atrial fibrillation) ([facility])     Moderate aortic regurgitation

## 2024-04-15 ENCOUNTER — HOSPITAL ENCOUNTER (OUTPATIENT)
Age: 69
Discharge: HOME | End: 2024-04-15
Payer: MEDICARE

## 2024-04-15 DIAGNOSIS — E03.1 CONGENITAL HYPOTHYROIDISM WITHOUT GOITER: ICD-10-CM

## 2024-04-15 DIAGNOSIS — E11.8 CONTROLLED TYPE 2 DIABETES MELLITUS WITH COMPLICATION, WITHOUT LONG-TERM CURRENT USE OF INSULIN (HCC): ICD-10-CM

## 2024-04-15 LAB
ANION GAP SERPL CALCULATED.3IONS-SCNC: 11 MMOL/L (ref 9–17)
BUN SERPL-MCNC: 15 MG/DL (ref 8–23)
BUN/CREAT SERPL: 21 (ref 9–20)
CALCIUM SERPL-MCNC: 9.5 MG/DL (ref 8.6–10.4)
CHLORIDE SERPL-SCNC: 103 MMOL/L (ref 98–107)
CO2 SERPL-SCNC: 25 MMOL/L (ref 20–31)
CREAT SERPL-MCNC: 0.7 MG/DL (ref 0.5–0.9)
EST. AVERAGE GLUCOSE BLD GHB EST-MCNC: 148 MG/DL
GFR SERPL CREATININE-BSD FRML MDRD: >90 ML/MIN/1.73M2
GLUCOSE SERPL-MCNC: 122 MG/DL (ref 70–99)
HBA1C MFR BLD: 6.8 % (ref 4–6)
POTASSIUM SERPL-SCNC: 4.6 MMOL/L (ref 3.7–5.3)
SODIUM SERPL-SCNC: 139 MMOL/L (ref 135–144)
T4 FREE SERPL-MCNC: 1.3 NG/DL (ref 0.92–1.68)
TSH SERPL DL<=0.05 MIU/L-ACNC: 0.43 UIU/ML (ref 0.3–5)

## 2024-04-15 PROCEDURE — 84443 ASSAY THYROID STIM HORMONE: CPT

## 2024-04-15 PROCEDURE — 80048 BASIC METABOLIC PNL TOTAL CA: CPT

## 2024-04-15 PROCEDURE — 36415 COLL VENOUS BLD VENIPUNCTURE: CPT

## 2024-04-15 PROCEDURE — 84439 ASSAY OF FREE THYROXINE: CPT

## 2024-04-15 PROCEDURE — 83036 HEMOGLOBIN GLYCOSYLATED A1C: CPT

## 2024-04-18 ENCOUNTER — OFFICE VISIT (OUTPATIENT)
Dept: PRIMARY CARE CLINIC | Age: 69
End: 2024-04-18
Payer: MEDICARE

## 2024-04-18 VITALS
OXYGEN SATURATION: 98 % | HEART RATE: 72 BPM | BODY MASS INDEX: 38.07 KG/M2 | DIASTOLIC BLOOD PRESSURE: 72 MMHG | RESPIRATION RATE: 20 BRPM | WEIGHT: 228.8 LBS | SYSTOLIC BLOOD PRESSURE: 128 MMHG | TEMPERATURE: 98.5 F

## 2024-04-18 DIAGNOSIS — M54.2 CHRONIC NECK PAIN: ICD-10-CM

## 2024-04-18 DIAGNOSIS — E11.8 CONTROLLED TYPE 2 DIABETES MELLITUS WITH COMPLICATION, WITHOUT LONG-TERM CURRENT USE OF INSULIN (HCC): Primary | ICD-10-CM

## 2024-04-18 DIAGNOSIS — E03.1 CONGENITAL HYPOTHYROIDISM WITHOUT GOITER: ICD-10-CM

## 2024-04-18 DIAGNOSIS — G89.29 CHRONIC NECK PAIN: ICD-10-CM

## 2024-04-18 DIAGNOSIS — I48.0 PAF (PAROXYSMAL ATRIAL FIBRILLATION) (HCC): ICD-10-CM

## 2024-04-18 PROCEDURE — G8427 DOCREV CUR MEDS BY ELIG CLIN: HCPCS | Performed by: NURSE PRACTITIONER

## 2024-04-18 PROCEDURE — G8417 CALC BMI ABV UP PARAM F/U: HCPCS | Performed by: NURSE PRACTITIONER

## 2024-04-18 PROCEDURE — G8400 PT W/DXA NO RESULTS DOC: HCPCS | Performed by: NURSE PRACTITIONER

## 2024-04-18 PROCEDURE — 3074F SYST BP LT 130 MM HG: CPT | Performed by: NURSE PRACTITIONER

## 2024-04-18 PROCEDURE — 2022F DILAT RTA XM EVC RTNOPTHY: CPT | Performed by: NURSE PRACTITIONER

## 2024-04-18 PROCEDURE — 3017F COLORECTAL CA SCREEN DOC REV: CPT | Performed by: NURSE PRACTITIONER

## 2024-04-18 PROCEDURE — 1036F TOBACCO NON-USER: CPT | Performed by: NURSE PRACTITIONER

## 2024-04-18 PROCEDURE — 3078F DIAST BP <80 MM HG: CPT | Performed by: NURSE PRACTITIONER

## 2024-04-18 PROCEDURE — 3044F HG A1C LEVEL LT 7.0%: CPT | Performed by: NURSE PRACTITIONER

## 2024-04-18 PROCEDURE — 99214 OFFICE O/P EST MOD 30 MIN: CPT | Performed by: NURSE PRACTITIONER

## 2024-04-18 PROCEDURE — 1090F PRES/ABSN URINE INCON ASSESS: CPT | Performed by: NURSE PRACTITIONER

## 2024-04-18 PROCEDURE — 1123F ACP DISCUSS/DSCN MKR DOCD: CPT | Performed by: NURSE PRACTITIONER

## 2024-04-18 RX ORDER — LEVOTHYROXINE SODIUM 175 UG/1
175 TABLET ORAL DAILY
Qty: 90 TABLET | Refills: 3 | Status: SHIPPED | OUTPATIENT
Start: 2024-04-18

## 2024-04-18 RX ORDER — METFORMIN HYDROCHLORIDE 500 MG/1
1000 TABLET, EXTENDED RELEASE ORAL
Qty: 180 TABLET | Refills: 3 | Status: SHIPPED | OUTPATIENT
Start: 2024-04-18

## 2024-04-18 RX ORDER — LISINOPRIL 2.5 MG/1
2.5 TABLET ORAL DAILY
Qty: 90 TABLET | Refills: 3 | Status: SHIPPED | OUTPATIENT
Start: 2024-04-18

## 2024-04-18 RX ORDER — GABAPENTIN 300 MG/1
300 CAPSULE ORAL 2 TIMES DAILY
Qty: 60 CAPSULE | Refills: 2 | Status: SHIPPED | OUTPATIENT
Start: 2024-04-18 | End: 2024-07-17

## 2024-04-18 RX ORDER — GABAPENTIN 300 MG/1
300 CAPSULE ORAL 3 TIMES DAILY
Qty: 90 CAPSULE | Refills: 2 | Status: CANCELLED | OUTPATIENT
Start: 2024-04-18 | End: 2025-05-12

## 2024-04-18 ASSESSMENT — PATIENT HEALTH QUESTIONNAIRE - PHQ9
SUM OF ALL RESPONSES TO PHQ9 QUESTIONS 1 & 2: 0
1. LITTLE INTEREST OR PLEASURE IN DOING THINGS: NOT AT ALL
2. FEELING DOWN, DEPRESSED OR HOPELESS: NOT AT ALL
SUM OF ALL RESPONSES TO PHQ QUESTIONS 1-9: 0

## 2024-04-18 ASSESSMENT — ENCOUNTER SYMPTOMS
SORE THROAT: 0
NAUSEA: 0
WHEEZING: 0
DIARRHEA: 0
SHORTNESS OF BREATH: 0
RHINORRHEA: 0
VOMITING: 0
HAIR LOSS: 0
BLURRED VISION: 0
VISUAL CHANGE: 0
TROUBLE SWALLOWING: 0
HOARSE VOICE: 0
COUGH: 0
PHOTOPHOBIA: 0
CONSTIPATION: 0

## 2024-04-18 NOTE — PROGRESS NOTES
CERVICAL FACET JOINT-C3-C4, C4-C5,C5-C6 performed by Timmy Gates MD at [facility] OR    HEEL SPUR SURGERY      right heel    KNEE ARTHROSCOPY Right 03/02/2021    partial medial meniscetomy    KNEE ARTHROSCOPY Right 3/2/2021    KNEE ARTHROSCOPY-WITH MEDIAL AND LATERAL partial MENISCECTOMY performed by Baljinder Muir MD at [facility] OR    PAIN MANAGEMENT PROCEDURE N/A 4/6/2021    EPIDURAL STEROID INJECTION-LUMBAR L3-4 performed by Timmy Gates MD at [facility] OR    PAIN MANAGEMENT PROCEDURE Bilateral 6/8/2021    LUMBAR FACET, L4-5,L5-S1 performed by Timmy Gates MD at [facility] OR    TONSILLECTOMY      TOOTH EXTRACTION      All of back teeth    TUBAL LIGATION          Medications:       Prior to Admission medications    Medication Sig Start Date End Date Taking? Authorizing Provider   metFORMIN (GLUCOPHAGE-XR) 500 MG extended release tablet Take 2 tablets by mouth daily (with breakfast) 4/18/24  Yes Jose Eduardo Johnson APRN - CNP   lisinopril (PRINIVIL;ZESTRIL) 2.5 MG tablet Take 1 tablet by mouth daily 4/18/24  Yes Jose Eduardo Johnson APRN - CNP   levothyroxine (SYNTHROID) 175 MCG tablet Take 1 tablet by mouth daily 4/18/24  Yes Jose Eduardo Johnson APRN - CNP   gabapentin (NEURONTIN) 300 MG capsule Take 1 capsule by mouth in the morning and at bedtime for 90 days. 4/18/24 7/17/24 Yes Jose Eduardo Johnson APRN - CNP   oxyBUTYnin (DITROPAN XL) 5 MG extended release tablet Take 1 tablet by mouth daily 4/12/24  Yes Jose Eduardo Johnson APRN - CNP   metoprolol tartrate (LOPRESSOR) 25 MG tablet TAKE 1/2 TABLET BY MOUTH TWICE A DAY 7/12/23  Yes Elham Rivero APRN - CNP   venlafaxine (EFFEXOR XR) 37.5 MG extended release capsule TAKE ONE CAPSULE BY MOUTH DAILY 7/12/23  Yes Elham Rivero APRN - CNP   hydrOXYzine HCl (ATARAX) 25 MG tablet TAKE ONE TABLET BY MOUTH EVERY 8 HOURS AS NEEDED FOR ANXIETY 7/12/23  Yes Elham Rivero APRN - CNP   SITagliptin (JANUVIA) 100 MG tablet TAKE ONE TABLET BY MOUTH DAILY 7/12/23  Yes Elham Rivero, APRN - CNP

## 2024-04-18 NOTE — PATIENT INSTRUCTIONS
SURVEY:     You may be receiving a survey from [facility] [employer] regarding your visit today.     Please complete the survey to enable us to provide the highest quality of care to you and your family.     If you cannot score us a very good on any question, please call the office to discuss how we could have made your experience a very good one.     Thank you,    Jose Eduardo Johnson, APRN-CNP  Elham Rivero, APRN-CNP  Vera, LPN  Anamaria, CMA  Andrew, CMA  Stephanie, CMA  Jazmin, PCA  Imani, CMA  Swathi, PM

## 2024-04-22 ENCOUNTER — OFFICE VISIT (OUTPATIENT)
Dept: CARDIOLOGY | Age: 69
End: 2024-04-22
Payer: MEDICARE

## 2024-04-22 VITALS
OXYGEN SATURATION: 96 % | HEART RATE: 69 BPM | DIASTOLIC BLOOD PRESSURE: 69 MMHG | BODY MASS INDEX: 37.89 KG/M2 | RESPIRATION RATE: 18 BRPM | WEIGHT: 227.4 LBS | HEIGHT: 65 IN | SYSTOLIC BLOOD PRESSURE: 140 MMHG

## 2024-04-22 DIAGNOSIS — E11.8 CONTROLLED TYPE 2 DIABETES MELLITUS WITH COMPLICATION, WITHOUT LONG-TERM CURRENT USE OF INSULIN (HCC): ICD-10-CM

## 2024-04-22 DIAGNOSIS — I05.0 MODERATE MITRAL STENOSIS BY PRIOR ECHOCARDIOGRAM: ICD-10-CM

## 2024-04-22 DIAGNOSIS — I10 ESSENTIAL HYPERTENSION: ICD-10-CM

## 2024-04-22 DIAGNOSIS — E78.5 DYSLIPIDEMIA: ICD-10-CM

## 2024-04-22 DIAGNOSIS — R06.02 SOB (SHORTNESS OF BREATH): Primary | ICD-10-CM

## 2024-04-22 DIAGNOSIS — I35.0 MODERATE AORTIC STENOSIS BY PRIOR ECHOCARDIOGRAM: ICD-10-CM

## 2024-04-22 PROCEDURE — 3077F SYST BP >= 140 MM HG: CPT | Performed by: FAMILY MEDICINE

## 2024-04-22 PROCEDURE — 3078F DIAST BP <80 MM HG: CPT | Performed by: FAMILY MEDICINE

## 2024-04-22 PROCEDURE — G8427 DOCREV CUR MEDS BY ELIG CLIN: HCPCS | Performed by: FAMILY MEDICINE

## 2024-04-22 PROCEDURE — 2022F DILAT RTA XM EVC RTNOPTHY: CPT | Performed by: FAMILY MEDICINE

## 2024-04-22 PROCEDURE — 99214 OFFICE O/P EST MOD 30 MIN: CPT | Performed by: FAMILY MEDICINE

## 2024-04-22 PROCEDURE — 1036F TOBACCO NON-USER: CPT | Performed by: FAMILY MEDICINE

## 2024-04-22 PROCEDURE — G8417 CALC BMI ABV UP PARAM F/U: HCPCS | Performed by: FAMILY MEDICINE

## 2024-04-22 PROCEDURE — 1123F ACP DISCUSS/DSCN MKR DOCD: CPT | Performed by: FAMILY MEDICINE

## 2024-04-22 PROCEDURE — 3044F HG A1C LEVEL LT 7.0%: CPT | Performed by: FAMILY MEDICINE

## 2024-04-22 PROCEDURE — 93000 ELECTROCARDIOGRAM COMPLETE: CPT | Performed by: FAMILY MEDICINE

## 2024-04-22 PROCEDURE — 3017F COLORECTAL CA SCREEN DOC REV: CPT | Performed by: FAMILY MEDICINE

## 2024-04-22 PROCEDURE — 1090F PRES/ABSN URINE INCON ASSESS: CPT | Performed by: FAMILY MEDICINE

## 2024-04-22 PROCEDURE — G8400 PT W/DXA NO RESULTS DOC: HCPCS | Performed by: FAMILY MEDICINE

## 2024-04-22 RX ORDER — METOPROLOL SUCCINATE 50 MG/1
50 TABLET, EXTENDED RELEASE ORAL DAILY
Qty: 90 TABLET | Refills: 3 | Status: SHIPPED | OUTPATIENT
Start: 2024-04-22

## 2024-04-22 RX ORDER — LISINOPRIL 5 MG/1
5 TABLET ORAL DAILY
Qty: 90 TABLET | Refills: 3 | Status: SHIPPED | OUTPATIENT
Start: 2024-04-22

## 2024-04-22 NOTE — PROGRESS NOTES
Channel Blocker: Not indicated at this time.   Anticoagulation: Not indicated at this time. No further episodes of atrial fibrillation noted on CAM.    Moderate  Aortic and Mitral Stenosis : symptomatic at least when in atrial fibrillation with RVR, spontaneously resolved  Antiplatelet Agent: STOP {aspirin and START Aspirin 81 mg daily. I also reminded them to watch for signs of bloody or black tarry stools and stop the medication immediately if this develops as this could be life threatening.  Beta Blocker: STOP metoprolol tartrate (Lopressor) and START Metoprolol succinate (Toprol XL) 50 mg daily.     ACE Inibitor/ARB: INCREASE to lisinopril 5 mg daily. I also discussed the potential side effects of this medication including lightheadedness and dizziness and instructed them to stop the medication of this occurs and call our office if this occurs.   Additional Testing List: None    Essential Hypertension: Controlled  Beta Blocker: STOP metoprolol tartrate (Lopressor) and START Metoprolol succinate (Toprol XL) 50 mg daily.     ACE Inibitor/ARB: INCREASE to lisinopril 5 mg daily.       Hyperlipidemia: Mixed.  on 10/14/2023  Cholesterol Reduction Therapy: Continue Atorvastatin (Lipitor) 80 mg daily.      Tobacco Abuse Counseling: She quit 8 months ago.      Once again, thank you for allowing me to participate in this patients care. Please do not hesitate to contact me if I could be of any further assistance.     FOLLOW UP: I told Ms. Sanderson to call my office if she had any problems, but otherwise told her to Return in about 1 year (around 4/22/2025). However, I would be happy to see her sooner should the need arise.     Sincerely,  Elmer Finch MD, MS, F.A.C.C.  [facility], [address] [address]  Phone: [phone], Fax: [phone]     I believe that the risk of significant morbidity and mortality related to the patient's current medical conditions are:

## 2024-04-26 ENCOUNTER — TELEPHONE (OUTPATIENT)
Dept: ONCOLOGY | Age: 69
End: 2024-04-26

## 2024-04-26 DIAGNOSIS — Z87.891 PERSONAL HISTORY OF NICOTINE DEPENDENCE: Primary | ICD-10-CM

## 2024-04-26 NOTE — TELEPHONE ENCOUNTER
Our records indicate that your patient is coming due for their annual lung cancer screening follow up testing.     For your convenience, we have pended the order for the scan for you. If you do not agree with the need for the test, please cancel the order and let us know.     Sincerely,    [facility]   Lung Cancer Screening Program    Auto printed reminder letter sent to patient.

## 2024-06-20 RX ORDER — HYDROXYZINE HYDROCHLORIDE 25 MG/1
25 TABLET, FILM COATED ORAL EVERY 8 HOURS PRN
Qty: 270 TABLET | Refills: 0 | Status: SHIPPED | OUTPATIENT
Start: 2024-06-20

## 2024-06-20 NOTE — TELEPHONE ENCOUNTER
Health Maintenance   Topic Date Due    Annual Wellness Visit (Medicare Advantage)  01/01/2024    Low dose CT lung screening &/or counseling  05/26/2024    Diabetic foot exam  10/17/2024 (Originally 6/28/2022)    DEXA (modify frequency per FRAX score)  10/17/2024 (Originally 1/2/2009)    Flu vaccine (Season Ended) 10/17/2024 (Originally 8/1/2024)    Pneumococcal 65+ years Vaccine (2 of 2 - PCV) 10/17/2024 (Originally 7/8/2017)    COVID-19 Vaccine (1) 10/17/2024 (Originally 1955)    Shingles vaccine (3 of 3) 04/18/2025 (Originally 10/13/2021)    Respiratory Syncytial Virus (RSV) Pregnant or age 60 yrs+ (1 - 1-dose 60+ series) 04/18/2025 (Originally 1/2/2014)    Diabetic retinal exam  04/28/2025 (Originally 11/6/2013)    Diabetic Alb to Cr ratio (uACR) test  10/14/2024    Lipids  10/14/2024    A1C test (Diabetic or Prediabetic)  04/15/2025    GFR test (Diabetes, CKD 3-4, OR last GFR 15-59)  04/15/2025    Depression Screen  04/18/2025    Breast cancer screen  05/26/2025    Colorectal Cancer Screen  04/12/2026    DTaP/Tdap/Td vaccine (2 - Td or Tdap) 06/28/2031    Hepatitis B vaccine  Completed    Hepatitis C screen  Completed    Hepatitis A vaccine  Aged Out    Hib vaccine  Aged Out    Polio vaccine  Aged Out    Meningococcal (ACWY) vaccine  Aged Out    Depression Monitoring  Discontinued             (applicable per patient's age: Cancer Screenings, Depression Screening, Fall Risk Screening, Immunizations)    Hemoglobin A1C (%)   Date Value   04/15/2024 6.8 (H)   10/14/2023 6.3 (H)   03/23/2023 6.1     AST (U/L)   Date Value   10/14/2023 13     ALT (U/L)   Date Value   10/14/2023 16     BUN (mg/dL)   Date Value   04/15/2024 15      (goal A1C is < 7)   (goal LDL is <100) need 30-50% reduction from baseline     BP Readings from Last 3 Encounters:   04/22/24 (!) 140/69   04/18/24 128/72   10/17/23 124/78    (goal /80)      All Future Testing planned in [facility]:  Lab Frequency Next Occurrence   CBC with Auto

## 2024-08-10 DIAGNOSIS — M54.2 CHRONIC NECK PAIN: ICD-10-CM

## 2024-08-10 DIAGNOSIS — G89.29 CHRONIC NECK PAIN: ICD-10-CM

## 2024-08-12 RX ORDER — GABAPENTIN 300 MG/1
CAPSULE ORAL
Qty: 60 CAPSULE | Refills: 2 | Status: SHIPPED | OUTPATIENT
Start: 2024-08-12 | End: 2024-11-10

## 2024-09-17 ENCOUNTER — TELEPHONE (OUTPATIENT)
Dept: PHARMACY | Facility: CLINIC | Age: 69
End: 2024-09-17

## 2024-09-25 DIAGNOSIS — E78.5 DYSLIPIDEMIA: ICD-10-CM

## 2024-09-25 RX ORDER — ATORVASTATIN CALCIUM 80 MG/1
TABLET, FILM COATED ORAL
Qty: 90 TABLET | Refills: 3 | Status: SHIPPED | OUTPATIENT
Start: 2024-09-25

## 2024-10-16 ENCOUNTER — TELEPHONE (OUTPATIENT)
Dept: PRIMARY CARE CLINIC | Age: 69
End: 2024-10-16

## 2024-10-22 ENCOUNTER — HOSPITAL ENCOUNTER (OUTPATIENT)
Age: 69
Discharge: HOME | End: 2024-10-22
Payer: MEDICARE

## 2024-10-22 DIAGNOSIS — E11.8 CONTROLLED TYPE 2 DIABETES MELLITUS WITH COMPLICATION, WITHOUT LONG-TERM CURRENT USE OF INSULIN (HCC): ICD-10-CM

## 2024-10-22 LAB
ALT SERPL-CCNC: 18 U/L (ref 10–35)
ANION GAP SERPL CALCULATED.3IONS-SCNC: 10 MMOL/L (ref 9–16)
AST SERPL-CCNC: 20 U/L (ref 10–35)
BASOPHILS # BLD: 0.05 K/UL (ref 0–0.2)
BASOPHILS NFR BLD: 1 % (ref 0–2)
BUN SERPL-MCNC: 16 MG/DL (ref 8–23)
BUN/CREAT SERPL: 18 (ref 9–20)
CALCIUM SERPL-MCNC: 9.4 MG/DL (ref 8.6–10.4)
CHLORIDE SERPL-SCNC: 103 MMOL/L (ref 98–107)
CHOLEST SERPL-MCNC: 198 MG/DL (ref 0–199)
CHOLESTEROL/HDL RATIO: 5
CO2 SERPL-SCNC: 26 MMOL/L (ref 20–31)
CREAT SERPL-MCNC: 0.9 MG/DL (ref 0.5–0.9)
CREAT UR-MCNC: 94.7 MG/DL (ref 28–217)
EOSINOPHIL # BLD: 0.33 K/UL (ref 0–0.44)
EOSINOPHILS RELATIVE PERCENT: 4 % (ref 1–4)
ERYTHROCYTE [DISTWIDTH] IN BLOOD BY AUTOMATED COUNT: 13.1 % (ref 11.8–14.4)
GFR, ESTIMATED: 71 ML/MIN/1.73M2
GLUCOSE SERPL-MCNC: 152 MG/DL (ref 74–99)
HCT VFR BLD AUTO: 39.9 % (ref 36.3–47.1)
HDLC SERPL-MCNC: 43 MG/DL
HGB BLD-MCNC: 12.9 G/DL (ref 11.9–15.1)
IMM GRANULOCYTES # BLD AUTO: 0.07 K/UL (ref 0–0.3)
IMM GRANULOCYTES NFR BLD: 1 %
LDLC SERPL CALC-MCNC: 126 MG/DL (ref 0–100)
LYMPHOCYTES NFR BLD: 1.96 K/UL (ref 1.1–3.7)
LYMPHOCYTES RELATIVE PERCENT: 25 % (ref 24–43)
MCH RBC QN AUTO: 28.9 PG (ref 25.2–33.5)
MCHC RBC AUTO-ENTMCNC: 32.3 G/DL (ref 28.4–34.8)
MCV RBC AUTO: 89.3 FL (ref 82.6–102.9)
MICROALBUMIN UR-MCNC: 40 MG/L (ref 0–20)
MICROALBUMIN/CREAT UR-RTO: 42 MCG/MG CREAT (ref 0–25)
MONOCYTES NFR BLD: 0.66 K/UL (ref 0.1–1.2)
MONOCYTES NFR BLD: 8 % (ref 3–12)
NEUTROPHILS NFR BLD: 61 % (ref 36–65)
NEUTS SEG NFR BLD: 4.75 K/UL (ref 1.5–8.1)
NRBC BLD-RTO: 0 PER 100 WBC
PLATELET # BLD AUTO: 232 K/UL (ref 138–453)
PMV BLD AUTO: 10.1 FL (ref 8.1–13.5)
POTASSIUM SERPL-SCNC: 4.3 MMOL/L (ref 3.7–5.3)
RBC # BLD AUTO: 4.47 M/UL (ref 3.95–5.11)
SODIUM SERPL-SCNC: 139 MMOL/L (ref 136–145)
TRIGL SERPL-MCNC: 146 MG/DL
VLDLC SERPL CALC-MCNC: 29 MG/DL
WBC OTHER # BLD: 7.8 K/UL (ref 3.5–11.3)

## 2024-10-22 PROCEDURE — 36415 COLL VENOUS BLD VENIPUNCTURE: CPT

## 2024-10-22 PROCEDURE — 80048 BASIC METABOLIC PNL TOTAL CA: CPT

## 2024-10-22 PROCEDURE — 80061 LIPID PANEL: CPT

## 2024-10-22 PROCEDURE — 82043 UR ALBUMIN QUANTITATIVE: CPT

## 2024-10-22 PROCEDURE — 85025 COMPLETE CBC W/AUTO DIFF WBC: CPT

## 2024-10-22 PROCEDURE — 82570 ASSAY OF URINE CREATININE: CPT

## 2024-10-22 PROCEDURE — 84450 TRANSFERASE (AST) (SGOT): CPT

## 2024-10-22 PROCEDURE — 84460 ALANINE AMINO (ALT) (SGPT): CPT

## 2024-10-24 ENCOUNTER — OFFICE VISIT (OUTPATIENT)
Dept: PRIMARY CARE CLINIC | Age: 69
End: 2024-10-24

## 2024-10-24 VITALS
OXYGEN SATURATION: 94 % | HEART RATE: 57 BPM | HEIGHT: 65 IN | BODY MASS INDEX: 41.25 KG/M2 | WEIGHT: 247.6 LBS | DIASTOLIC BLOOD PRESSURE: 78 MMHG | SYSTOLIC BLOOD PRESSURE: 132 MMHG | TEMPERATURE: 97.2 F

## 2024-10-24 DIAGNOSIS — Z00.00 MEDICARE ANNUAL WELLNESS VISIT, SUBSEQUENT: Primary | ICD-10-CM

## 2024-10-24 DIAGNOSIS — E03.1 CONGENITAL HYPOTHYROIDISM WITHOUT GOITER: ICD-10-CM

## 2024-10-24 DIAGNOSIS — E11.8 CONTROLLED TYPE 2 DIABETES MELLITUS WITH COMPLICATION, WITHOUT LONG-TERM CURRENT USE OF INSULIN (HCC): ICD-10-CM

## 2024-10-24 DIAGNOSIS — Z87.891 PERSONAL HISTORY OF TOBACCO USE: ICD-10-CM

## 2024-10-24 DIAGNOSIS — F34.1 DYSTHYMIA: ICD-10-CM

## 2024-10-24 DIAGNOSIS — Z13.31 POSITIVE DEPRESSION SCREENING: ICD-10-CM

## 2024-10-24 LAB — HBA1C MFR BLD: 7.5 %

## 2024-10-24 RX ORDER — ALBUTEROL SULFATE 90 UG/1
2 INHALANT RESPIRATORY (INHALATION)
Qty: 1 EACH | Refills: 0 | Status: SHIPPED | OUTPATIENT
Start: 2024-10-24

## 2024-10-24 RX ORDER — LANCETS 33 GAUGE
1 EACH MISCELLANEOUS DAILY
Qty: 100 EACH | Refills: 3 | Status: SHIPPED | OUTPATIENT
Start: 2024-10-24

## 2024-10-24 RX ORDER — CALCIUM CITRATE/VITAMIN D3 200MG-6.25
1 TABLET ORAL DAILY
Qty: 100 EACH | Refills: 3 | Status: SHIPPED | OUTPATIENT
Start: 2024-10-24

## 2024-10-24 RX ORDER — OXYBUTYNIN CHLORIDE 10 MG/1
10 TABLET, EXTENDED RELEASE ORAL DAILY
Qty: 90 TABLET | Refills: 3 | Status: SHIPPED | OUTPATIENT
Start: 2024-10-24

## 2024-10-24 RX ORDER — VENLAFAXINE HYDROCHLORIDE 75 MG/1
75 CAPSULE, EXTENDED RELEASE ORAL DAILY
Qty: 90 CAPSULE | Refills: 3 | Status: SHIPPED | OUTPATIENT
Start: 2024-10-24

## 2024-10-24 SDOH — ECONOMIC STABILITY: INCOME INSECURITY: HOW HARD IS IT FOR YOU TO PAY FOR THE VERY BASICS LIKE FOOD, HOUSING, MEDICAL CARE, AND HEATING?: NOT HARD AT ALL

## 2024-10-24 SDOH — ECONOMIC STABILITY: FOOD INSECURITY: WITHIN THE PAST 12 MONTHS, THE FOOD YOU BOUGHT JUST DIDN'T LAST AND YOU DIDN'T HAVE MONEY TO GET MORE.: NEVER TRUE

## 2024-10-24 SDOH — ECONOMIC STABILITY: FOOD INSECURITY: WITHIN THE PAST 12 MONTHS, YOU WORRIED THAT YOUR FOOD WOULD RUN OUT BEFORE YOU GOT MONEY TO BUY MORE.: NEVER TRUE

## 2024-10-24 ASSESSMENT — PATIENT HEALTH QUESTIONNAIRE - PHQ9
SUM OF ALL RESPONSES TO PHQ QUESTIONS 1-9: 16
2. FEELING DOWN, DEPRESSED OR HOPELESS: NEARLY EVERY DAY
1. LITTLE INTEREST OR PLEASURE IN DOING THINGS: NEARLY EVERY DAY
SUM OF ALL RESPONSES TO PHQ QUESTIONS 1-9: 15
5. POOR APPETITE OR OVEREATING: NEARLY EVERY DAY
9. THOUGHTS THAT YOU WOULD BE BETTER OFF DEAD, OR OF HURTING YOURSELF: SEVERAL DAYS
10. IF YOU CHECKED OFF ANY PROBLEMS, HOW DIFFICULT HAVE THESE PROBLEMS MADE IT FOR YOU TO DO YOUR WORK, TAKE CARE OF THINGS AT HOME, OR GET ALONG WITH OTHER PEOPLE: SOMEWHAT DIFFICULT
SUM OF ALL RESPONSES TO PHQ9 QUESTIONS 1 & 2: 6
SUM OF ALL RESPONSES TO PHQ QUESTIONS 1-9: 16
6. FEELING BAD ABOUT YOURSELF - OR THAT YOU ARE A FAILURE OR HAVE LET YOURSELF OR YOUR FAMILY DOWN: SEVERAL DAYS
3. TROUBLE FALLING OR STAYING ASLEEP: SEVERAL DAYS
SUM OF ALL RESPONSES TO PHQ QUESTIONS 1-9: 16
4. FEELING TIRED OR HAVING LITTLE ENERGY: NEARLY EVERY DAY
8. MOVING OR SPEAKING SO SLOWLY THAT OTHER PEOPLE COULD HAVE NOTICED. OR THE OPPOSITE, BEING SO FIGETY OR RESTLESS THAT YOU HAVE BEEN MOVING AROUND A LOT MORE THAN USUAL: NOT AT ALL
7. TROUBLE CONCENTRATING ON THINGS, SUCH AS READING THE NEWSPAPER OR WATCHING TELEVISION: SEVERAL DAYS

## 2024-10-24 ASSESSMENT — ENCOUNTER SYMPTOMS
VISUAL CHANGE: 0
BLURRED VISION: 0
ABDOMINAL PAIN: 0
VOMITING: 0
HAIR LOSS: 0
COUGH: 0
WHEEZING: 0
RHINORRHEA: 0
SHORTNESS OF BREATH: 0
SORE THROAT: 0
DIARRHEA: 0
HOARSE VOICE: 0
NAUSEA: 0
CONSTIPATION: 0

## 2024-10-24 ASSESSMENT — LIFESTYLE VARIABLES
HOW MANY STANDARD DRINKS CONTAINING ALCOHOL DO YOU HAVE ON A TYPICAL DAY: PATIENT DOES NOT DRINK
HOW OFTEN DO YOU HAVE A DRINK CONTAINING ALCOHOL: NEVER

## 2024-10-24 ASSESSMENT — COLUMBIA-SUICIDE SEVERITY RATING SCALE - C-SSRS
2. HAVE YOU ACTUALLY HAD ANY THOUGHTS OF KILLING YOURSELF?: NO
1. WITHIN THE PAST MONTH, HAVE YOU WISHED YOU WERE DEAD OR WISHED YOU COULD GO TO SLEEP AND NOT WAKE UP?: YES
6. HAVE YOU EVER DONE ANYTHING, STARTED TO DO ANYTHING, OR PREPARED TO DO ANYTHING TO END YOUR LIFE?: NO

## 2024-10-24 NOTE — PROGRESS NOTES
dizziness, headaches, seizures, sweats or tremors. Pertinent negatives for diabetes include no blurred vision, no chest pain, no fatigue, no polydipsia, no polyphagia, no polyuria, no visual change, no weakness and no weight loss. There are no hypoglycemic complications. Pertinent negatives for diabetic complications include no CVA, heart disease, nephropathy or peripheral neuropathy. Risk factors for coronary artery disease include hypertension, stress, sedentary lifestyle, post-menopausal, obesity, family history, dyslipidemia and diabetes mellitus. Current diabetic treatment includes oral agent (dual therapy). She is compliant with treatment most of the time. Her weight is stable. She is following a generally healthy diet. Meal planning includes avoidance of concentrated sweets. She has not had a previous visit with a dietitian. She never participates in exercise. There is no change in her home blood glucose trend. Her breakfast blood glucose range is generally 130-140 mg/dl. An ACE inhibitor/angiotensin II receptor blocker is being taken. She does not see a podiatrist.Eye exam is current.   Thyroid Problem  Presents for follow-up visit. Symptoms include anxiety. Patient reports no cold intolerance, constipation, depressed mood, diaphoresis, diarrhea, dry skin, fatigue, hair loss, heat intolerance, hoarse voice, leg swelling, menstrual problem, nail problem, palpitations, tremors, visual change, weight gain or weight loss. The symptoms have been stable. Her past medical history is significant for diabetes.   Mental Health Problem  The primary symptoms include dysphoric mood. The primary symptoms do not include delusions, hallucinations, bizarre behavior, disorganized speech, negative symptoms or somatic symptoms. The current episode started more than 1 month ago. This is a chronic problem.   The onset of the illness is precipitated by emotional stress and a stressful event. The degree of incapacity that she is

## 2024-10-24 NOTE — PATIENT INSTRUCTIONS
SURVEY:     You may be receiving a survey from [employer] regarding your visit today.     Please complete the survey to enable us to provide the highest quality of care to you and your family.     If you cannot score us a very good on any question, please call the office to discuss how we could have made your experience a very good one.     Thank you,    Jose Eduardo Johnson, APRN-CNP  Elham Rivero, APRN-CNP  Vera, LPN  Anamaria, CMA  Andrew, CMA  Stephanie, CMA  Jazmin, PCA  Imani, CMA  Swathi, PM        Preventing Falls: Care Instructions  Injuries and health problems such as trouble walking or poor eyesight can increase your risk of falling. So can some medicines. But there are things you can do to help prevent falls. You can exercise to get stronger. You can also arrange your home to make it safer.    Talk to your doctor about the medicines you take. Ask if any of them increase the risk of falls and whether they can be changed or stopped.   Try to exercise regularly. It can help improve your strength and balance. This can help lower your risk of falling.         Practice fall safety and prevention.   Wear low-heeled shoes that fit well and give your feet good support. Talk to your doctor if you have foot problems that make this hard.  Carry a cellphone or wear a medical alert device that you can use to call for help.  Use stepladders instead of chairs to reach high objects. Don't climb if you're at risk for falls. Ask for help, if needed.  Wear the correct eyeglasses, if you need them.        Make your home safer.   Remove rugs, cords, clutter, and furniture from walkways.  Keep your house well lit. Use night-lights in hallways and bathrooms.  Install and use sturdy handrails on stairways.  Wear nonskid footwear, even inside. Don't walk barefoot or in socks without shoes.        Be safe outside.   Use handrails, curb cuts, and ramps whenever possible.  Keep your hands free by using a shoulder bag or backpack.  Try to walk

## 2024-10-29 ENCOUNTER — TELEPHONE (OUTPATIENT)
Dept: PRIMARY CARE CLINIC | Age: 69
End: 2024-10-29

## 2024-10-29 ENCOUNTER — HOSPITAL ENCOUNTER (OUTPATIENT)
Age: 69
Discharge: HOME | End: 2024-10-29
Payer: MEDICARE

## 2024-10-29 DIAGNOSIS — R41.0 CONFUSION: Primary | ICD-10-CM

## 2024-10-29 DIAGNOSIS — R41.0 CONFUSION: ICD-10-CM

## 2024-10-29 DIAGNOSIS — N39.0 ACUTE UTI: Primary | ICD-10-CM

## 2024-10-29 LAB
ALBUMIN SERPL-MCNC: 4.1 G/DL (ref 3.5–5.2)
ALBUMIN/GLOB SERPL: 1.2 {RATIO} (ref 1–2.5)
ALP SERPL-CCNC: 151 U/L (ref 35–104)
ALT SERPL-CCNC: 17 U/L (ref 10–35)
AMMONIA PLAS-SCNC: 15 UMOL/L (ref 11–51)
ANION GAP SERPL CALCULATED.3IONS-SCNC: 12 MMOL/L (ref 9–16)
AST SERPL-CCNC: 18 U/L (ref 10–35)
BACTERIA URNS QL MICRO: ABNORMAL
BILIRUB SERPL-MCNC: 0.4 MG/DL (ref 0–1.2)
BILIRUB UR QL STRIP: NEGATIVE
BUN SERPL-MCNC: 16 MG/DL (ref 8–23)
BUN/CREAT SERPL: 16 (ref 9–20)
CALCIUM SERPL-MCNC: 9.8 MG/DL (ref 8.6–10.4)
CHLORIDE SERPL-SCNC: 103 MMOL/L (ref 98–107)
CLARITY UR: CLEAR
CO2 SERPL-SCNC: 26 MMOL/L (ref 20–31)
COLOR UR: YELLOW
CREAT SERPL-MCNC: 1 MG/DL (ref 0.5–0.9)
EPI CELLS #/AREA URNS HPF: ABNORMAL /HPF (ref 0–25)
GFR, ESTIMATED: 62 ML/MIN/1.73M2
GLUCOSE SERPL-MCNC: 107 MG/DL (ref 74–99)
GLUCOSE UR STRIP-MCNC: NEGATIVE MG/DL
HGB UR QL STRIP.AUTO: NEGATIVE
KETONES UR STRIP-MCNC: NEGATIVE MG/DL
LEUKOCYTE ESTERASE UR QL STRIP: ABNORMAL
MAGNESIUM SERPL-MCNC: 1.7 MG/DL (ref 1.6–2.4)
NITRITE UR QL STRIP: NEGATIVE
PH UR STRIP: 7 [PH] (ref 5–9)
POTASSIUM SERPL-SCNC: 4.3 MMOL/L (ref 3.7–5.3)
PROT SERPL-MCNC: 7.5 G/DL (ref 6.6–8.7)
PROT UR STRIP-MCNC: NEGATIVE MG/DL
RBC #/AREA URNS HPF: ABNORMAL /HPF (ref 0–2)
SODIUM SERPL-SCNC: 141 MMOL/L (ref 136–145)
SP GR UR STRIP: 1.01 (ref 1.01–1.02)
T4 FREE SERPL-MCNC: 1.2 NG/DL (ref 0.92–1.68)
TSH SERPL DL<=0.05 MIU/L-ACNC: 5.65 UIU/ML (ref 0.27–4.2)
UROBILINOGEN UR STRIP-ACNC: NORMAL EU/DL (ref 0–1)
WBC #/AREA URNS HPF: ABNORMAL /HPF (ref 0–5)

## 2024-10-29 PROCEDURE — 87086 URINE CULTURE/COLONY COUNT: CPT

## 2024-10-29 PROCEDURE — 83735 ASSAY OF MAGNESIUM: CPT

## 2024-10-29 PROCEDURE — 81001 URINALYSIS AUTO W/SCOPE: CPT

## 2024-10-29 PROCEDURE — 84443 ASSAY THYROID STIM HORMONE: CPT

## 2024-10-29 PROCEDURE — 84439 ASSAY OF FREE THYROXINE: CPT

## 2024-10-29 PROCEDURE — 80053 COMPREHEN METABOLIC PANEL: CPT

## 2024-10-29 PROCEDURE — 36415 COLL VENOUS BLD VENIPUNCTURE: CPT

## 2024-10-29 PROCEDURE — 82140 ASSAY OF AMMONIA: CPT

## 2024-10-29 RX ORDER — CIPROFLOXACIN 500 MG/1
500 TABLET, FILM COATED ORAL 2 TIMES DAILY
Qty: 14 TABLET | Refills: 0 | Status: SHIPPED | OUTPATIENT
Start: 2024-10-29 | End: 2024-11-05

## 2024-10-29 NOTE — TELEPHONE ENCOUNTER
Patients daughter contacted the office stating that patient is talking to people who are not there throughout the night, confusion, no urinary symptoms other than ongoing incontinence for months. Patients daughter is expressing concerns of UTI, patient told daughter she did not bring up issues during 10/24 visit due to she did not want B. Might to think she is crazy.         Please Advise.

## 2024-10-29 NOTE — TELEPHONE ENCOUNTER
I have ordered some labs for her to complete.  Although I would suggest if she has continued hallucinations and confusion she be seen in the ER.  Thank you.

## 2024-10-29 NOTE — TELEPHONE ENCOUNTER
----- Message from CYRUS Miranda CNP sent at 10/29/2024  4:33 PM EDT -----  It appears she has a UTI.  We will treat her with antibiotics.  If any worsening I would like her to be seen in the ER.  Thank you.

## 2024-10-30 ENCOUNTER — TELEPHONE (OUTPATIENT)
Dept: PRIMARY CARE CLINIC | Age: 69
End: 2024-10-30

## 2024-10-30 LAB
MICROORGANISM SPEC CULT: NORMAL
SPECIMEN DESCRIPTION: NORMAL

## 2024-10-30 NOTE — TELEPHONE ENCOUNTER
Cyndy called for lab results.  Results given for UA along with prescription information.    Cyndy would like her other lab results.

## 2024-10-30 NOTE — TELEPHONE ENCOUNTER
Her other labs are okay.  Make sure she is taking her thyroid medication on an empty stomach with water only.  TSH is just a little bit off.  Otherwise completely normal labs.

## 2024-11-04 RX ORDER — HYDROXYZINE HYDROCHLORIDE 25 MG/1
25 TABLET, FILM COATED ORAL EVERY 8 HOURS PRN
Qty: 270 TABLET | Refills: 0 | Status: SHIPPED | OUTPATIENT
Start: 2024-11-04

## 2024-11-04 NOTE — TELEPHONE ENCOUNTER
[Initial/Annual] Once 05/26/2024   T4, Free Once 04/22/2025   TSH Once 04/22/2025   Hemoglobin A1C Once 04/22/2025   Basic Metabolic Panel Once 04/24/2025       Next Visit Date:  Future Appointments   Date Time Provider Department Center   4/22/2025  3:00 PM Elmer Finch MD [facility]   4/28/2025  3:40 PM Jose Eduardo Johnson, APRN - CNP [facility]            Patient Active Problem List:     Dermatophytosis of foot     Controlled type 2 diabetes mellitus with complication, without long-term current use of insulin ([facility])     Dyslipidemia     Hypothyroid     Obesity (BMI 30-39.9)     Essential hypertension     Noncompliance with therapeutic plan     Tobacco abuse     Positive FIT (fecal immunochemical test)     Morbidly obese     Hypoxia     Atrial fibrillation with RVR (HCC)     Aspiration pneumonia (HCC)     S/P arthroscopic partial medial meniscectomy -- right knee     Moderate mitral stenosis by prior echocardiogram     Moderate aortic stenosis by prior echocardiogram     Stress reaction     PAF (paroxysmal atrial fibrillation) ([facility])     Moderate aortic regurgitation

## 2024-11-15 NOTE — PLAN OF CARE
Problem: Falls - Risk of:  Goal: Will remain free from falls  Description: Will remain free from falls  3/3/2021 0851 by Keira Erazo RN  Outcome: Ongoing  3/2/2021 2232 by Paresh Peres RN  Note: Fall risk assessment done and patient is a high risk for falls. Alarms on as needed for patient safety. Patient being monitored on a regular basis. No falls noted at this time. 3/2/2021 2231 by Paresh Peres RN  Outcome: Ongoing  Note: Fall risk assessment done and patient is a high risk for falls. Alarms on as needed for patient safety. Patient being monitored on a regular basis. No falls noted at this time. Goal: Absence of physical injury  Description: Absence of physical injury  Outcome: Ongoing     Problem: Pain:  Description: Pain management should include both nonpharmacologic and pharmacologic interventions. Goal: Pain level will decrease  Description: Pain level will decrease  3/3/2021 0851 by Keira Erazo RN  Outcome: Ongoing  3/2/2021 2232 by Paresh Peres RN  Outcome: Ongoing  Note: Pain assessed routinely and as needed with a 0-10 scale. PRN pain medication given as appropriate per orders. Pain reassessed within an hour, will continue to monitor    3/2/2021 2231 by Paresh Peres RN  Outcome: Ongoing  Note: Pain assessed routinely and as needed with a 0-10 scale. PRN pain medication given as appropriate per orders.  Pain reassessed within an hour, will continue to monitor    Goal: Control of acute pain  Description: Control of acute pain  Outcome: Ongoing  Goal: Control of chronic pain  Description: Control of chronic pain  Outcome: Ongoing     Problem: Airway Clearance - Ineffective:  Goal: Clear lung sounds  Description: Clear lung sounds  3/3/2021 0851 by Keira Erazo RN  Outcome: Ongoing  3/2/2021 2232 by Paresh Peres RN  Outcome: Ongoing  Note: Patient has diminished lung sounds  Goal: Ability to maintain a clear airway will improve  Description: Ability to maintain a clear airway will improve  Outcome: Ongoing     Problem: Gas Exchange - Impaired:  Goal: Levels of oxygenation will improve  Description: Levels of oxygenation will improve  3/3/2021 0851 by Shaw Gomez RN  Outcome: Ongoing  3/2/2021 2232 by Jourdan Shah RN  Outcome: Ongoing  Note: Patient remains on O2 but SpO2 is in the mid 90s.      Problem: Musculor/Skeletal Functional Status  Goal: Highest potential functional level  Outcome: Ongoing  Goal: Absence of falls  Outcome: Ongoing No indicators present

## 2025-01-25 NOTE — TELEPHONE ENCOUNTER
----- Message from CYRUS Acosta CNP sent at 1/11/2021  1:30 PM EST -----  She does have some fluid around the right knee. Use walker, brace, medication as instructed. Follow with orthopedics as planned. OT evaluate and treat

## 2025-01-30 NOTE — TELEPHONE ENCOUNTER
Pt notified and verbalized understanding. Specific medications requesting must be listed. Referral can not proceed without this information.

## 2025-03-17 DIAGNOSIS — G89.29 CHRONIC NECK PAIN: ICD-10-CM

## 2025-03-17 DIAGNOSIS — M54.2 CHRONIC NECK PAIN: ICD-10-CM

## 2025-03-17 RX ORDER — GABAPENTIN 300 MG/1
300 CAPSULE ORAL 2 TIMES DAILY
Qty: 60 CAPSULE | Refills: 2 | Status: SHIPPED | OUTPATIENT
Start: 2025-03-17 | End: 2025-06-15

## 2025-03-17 NOTE — TELEPHONE ENCOUNTER
contrast/bubble/strain/3D) Once 04/22/2024   CT lung screen [Initial/Annual] Once 05/26/2024   T4, Free Once 04/22/2025   TSH Once 04/22/2025   Hemoglobin A1C Once 04/22/2025   Basic Metabolic Panel Once 04/24/2025       Next Visit Date:  Future Appointments   Date Time Provider Department Center   4/22/2025  2:40 PM Elmer Finch MD [facility]   4/28/2025  3:40 PM Jose Eduardo Johnson APRN - CNP [facility] DE[facility]            Patient Active Problem List:     Dermatophytosis of foot     Controlled type 2 diabetes mellitus with complication, without long-term current use of insulin (HCC)     Dyslipidemia     Hypothyroid     Obesity (BMI 30-39.9)     Essential hypertension     Noncompliance with therapeutic plan     Tobacco abuse     Positive FIT (fecal immunochemical test)     Morbidly obese     Hypoxia     Atrial fibrillation with RVR (HCC)     Aspiration pneumonia (HCC)     S/P arthroscopic partial medial meniscectomy -- right knee     Moderate mitral stenosis by prior echocardiogram     Moderate aortic stenosis by prior echocardiogram     Stress reaction     PAF (paroxysmal atrial fibrillation) ([facility])     Moderate aortic regurgitation

## 2025-04-21 ENCOUNTER — TELEPHONE (OUTPATIENT)
Dept: PRIMARY CARE CLINIC | Age: 70
End: 2025-04-21

## 2025-04-23 RX ORDER — METOPROLOL SUCCINATE 50 MG/1
TABLET, EXTENDED RELEASE ORAL
Qty: 90 TABLET | Refills: 11 | OUTPATIENT
Start: 2025-04-23

## 2025-04-24 ENCOUNTER — HOSPITAL ENCOUNTER (OUTPATIENT)
Age: 70
Discharge: HOME | End: 2025-04-24
Payer: MEDICARE

## 2025-04-24 DIAGNOSIS — E03.1 CONGENITAL HYPOTHYROIDISM WITHOUT GOITER: ICD-10-CM

## 2025-04-24 DIAGNOSIS — E11.8 CONTROLLED TYPE 2 DIABETES MELLITUS WITH COMPLICATION, WITHOUT LONG-TERM CURRENT USE OF INSULIN (HCC): ICD-10-CM

## 2025-04-24 LAB
ANION GAP SERPL CALCULATED.3IONS-SCNC: 11 MMOL/L (ref 9–16)
BUN SERPL-MCNC: 13 MG/DL (ref 8–23)
BUN/CREAT SERPL: 16 (ref 9–20)
CALCIUM SERPL-MCNC: 9.3 MG/DL (ref 8.6–10.4)
CHLORIDE SERPL-SCNC: 100 MMOL/L (ref 98–107)
CO2 SERPL-SCNC: 26 MMOL/L (ref 20–31)
CREAT SERPL-MCNC: 0.8 MG/DL (ref 0.5–0.9)
EST. AVERAGE GLUCOSE BLD GHB EST-MCNC: 163 MG/DL
GFR, ESTIMATED: 85 ML/MIN/1.73M2
GLUCOSE SERPL-MCNC: 152 MG/DL (ref 74–99)
HBA1C MFR BLD: 7.3 % (ref 4–6)
POTASSIUM SERPL-SCNC: 3.9 MMOL/L (ref 3.7–5.3)
SODIUM SERPL-SCNC: 137 MMOL/L (ref 136–145)
T4 FREE SERPL-MCNC: 1.3 NG/DL (ref 0.92–1.68)
TSH SERPL DL<=0.05 MIU/L-ACNC: 0.9 UIU/ML (ref 0.27–4.2)

## 2025-04-24 PROCEDURE — 36415 COLL VENOUS BLD VENIPUNCTURE: CPT

## 2025-04-24 PROCEDURE — 84443 ASSAY THYROID STIM HORMONE: CPT

## 2025-04-24 PROCEDURE — 80048 BASIC METABOLIC PNL TOTAL CA: CPT

## 2025-04-24 PROCEDURE — 83036 HEMOGLOBIN GLYCOSYLATED A1C: CPT

## 2025-04-24 PROCEDURE — 84439 ASSAY OF FREE THYROXINE: CPT

## 2025-04-28 ENCOUNTER — HOSPITAL ENCOUNTER (OUTPATIENT)
Age: 70
Setting detail: SPECIMEN
Discharge: HOME | End: 2025-04-28
Payer: MEDICARE

## 2025-04-28 ENCOUNTER — OFFICE VISIT (OUTPATIENT)
Dept: PRIMARY CARE CLINIC | Age: 70
End: 2025-04-28
Payer: MEDICARE

## 2025-04-28 VITALS
DIASTOLIC BLOOD PRESSURE: 80 MMHG | SYSTOLIC BLOOD PRESSURE: 120 MMHG | BODY MASS INDEX: 40.85 KG/M2 | HEART RATE: 52 BPM | RESPIRATION RATE: 18 BRPM | WEIGHT: 245.2 LBS | HEIGHT: 65 IN | TEMPERATURE: 99.1 F | OXYGEN SATURATION: 97 %

## 2025-04-28 DIAGNOSIS — Z00.00 MEDICARE ANNUAL WELLNESS VISIT, SUBSEQUENT: Primary | ICD-10-CM

## 2025-04-28 DIAGNOSIS — Z87.891 PERSONAL HISTORY OF TOBACCO USE: ICD-10-CM

## 2025-04-28 DIAGNOSIS — E11.8 CONTROLLED TYPE 2 DIABETES MELLITUS WITH COMPLICATION, WITHOUT LONG-TERM CURRENT USE OF INSULIN (HCC): ICD-10-CM

## 2025-04-28 DIAGNOSIS — E03.1 CONGENITAL HYPOTHYROIDISM WITHOUT GOITER: ICD-10-CM

## 2025-04-28 DIAGNOSIS — R35.0 URINARY FREQUENCY: ICD-10-CM

## 2025-04-28 DIAGNOSIS — I48.0 PAF (PAROXYSMAL ATRIAL FIBRILLATION) (HCC): ICD-10-CM

## 2025-04-28 DIAGNOSIS — N39.0 ACUTE UTI: ICD-10-CM

## 2025-04-28 DIAGNOSIS — Z12.31 VISIT FOR SCREENING MAMMOGRAM: ICD-10-CM

## 2025-04-28 LAB
BILIRUBIN, POC: NORMAL
BLOOD URINE, POC: NORMAL
CLARITY, POC: CLEAR
COLOR, POC: YELLOW
GLUCOSE URINE, POC: NORMAL MG/DL
KETONES, POC: NORMAL MG/DL
LEUKOCYTE EST, POC: NORMAL
NITRITE, POC: NORMAL
PH, POC: 6.5
PROTEIN, POC: NORMAL MG/DL
SPECIFIC GRAVITY, POC: 1.01
UROBILINOGEN, POC: 1 MG/DL

## 2025-04-28 PROCEDURE — 1160F RVW MEDS BY RX/DR IN RCRD: CPT | Performed by: NURSE PRACTITIONER

## 2025-04-28 PROCEDURE — 1123F ACP DISCUSS/DSCN MKR DOCD: CPT | Performed by: NURSE PRACTITIONER

## 2025-04-28 PROCEDURE — 1159F MED LIST DOCD IN RCRD: CPT | Performed by: NURSE PRACTITIONER

## 2025-04-28 PROCEDURE — 3051F HG A1C>EQUAL 7.0%<8.0%: CPT | Performed by: NURSE PRACTITIONER

## 2025-04-28 PROCEDURE — 87086 URINE CULTURE/COLONY COUNT: CPT

## 2025-04-28 PROCEDURE — 3079F DIAST BP 80-89 MM HG: CPT | Performed by: NURSE PRACTITIONER

## 2025-04-28 PROCEDURE — G0439 PPPS, SUBSEQ VISIT: HCPCS | Performed by: NURSE PRACTITIONER

## 2025-04-28 PROCEDURE — 3017F COLORECTAL CA SCREEN DOC REV: CPT | Performed by: NURSE PRACTITIONER

## 2025-04-28 PROCEDURE — G0296 VISIT TO DETERM LDCT ELIG: HCPCS | Performed by: NURSE PRACTITIONER

## 2025-04-28 PROCEDURE — 3074F SYST BP LT 130 MM HG: CPT | Performed by: NURSE PRACTITIONER

## 2025-04-28 PROCEDURE — 81003 URINALYSIS AUTO W/O SCOPE: CPT | Performed by: NURSE PRACTITIONER

## 2025-04-28 RX ORDER — CEPHALEXIN 500 MG/1
500 CAPSULE ORAL 2 TIMES DAILY
Qty: 10 CAPSULE | Refills: 0 | Status: SHIPPED | OUTPATIENT
Start: 2025-04-28 | End: 2025-04-28

## 2025-04-28 RX ORDER — METOPROLOL SUCCINATE 50 MG/1
TABLET, EXTENDED RELEASE ORAL
Qty: 90 TABLET | Refills: 1 | OUTPATIENT
Start: 2025-04-28

## 2025-04-28 RX ORDER — CALCIUM CITRATE/VITAMIN D3 200MG-6.25
1 TABLET ORAL DAILY
Qty: 100 EACH | Refills: 3 | Status: SHIPPED | OUTPATIENT
Start: 2025-04-28

## 2025-04-28 RX ORDER — CEPHALEXIN 500 MG/1
500 CAPSULE ORAL 2 TIMES DAILY
Qty: 10 CAPSULE | Refills: 0 | Status: SHIPPED | OUTPATIENT
Start: 2025-04-28 | End: 2025-05-03

## 2025-04-28 SDOH — ECONOMIC STABILITY: FOOD INSECURITY: WITHIN THE PAST 12 MONTHS, YOU WORRIED THAT YOUR FOOD WOULD RUN OUT BEFORE YOU GOT MONEY TO BUY MORE.: NEVER TRUE

## 2025-04-28 SDOH — ECONOMIC STABILITY: FOOD INSECURITY: WITHIN THE PAST 12 MONTHS, THE FOOD YOU BOUGHT JUST DIDN'T LAST AND YOU DIDN'T HAVE MONEY TO GET MORE.: NEVER TRUE

## 2025-04-28 ASSESSMENT — PATIENT HEALTH QUESTIONNAIRE - PHQ9
SUM OF ALL RESPONSES TO PHQ QUESTIONS 1-9: 0
2. FEELING DOWN, DEPRESSED OR HOPELESS: NOT AT ALL
4. FEELING TIRED OR HAVING LITTLE ENERGY: NOT AT ALL
8. MOVING OR SPEAKING SO SLOWLY THAT OTHER PEOPLE COULD HAVE NOTICED. OR THE OPPOSITE, BEING SO FIGETY OR RESTLESS THAT YOU HAVE BEEN MOVING AROUND A LOT MORE THAN USUAL: NOT AT ALL
1. LITTLE INTEREST OR PLEASURE IN DOING THINGS: NOT AT ALL
SUM OF ALL RESPONSES TO PHQ QUESTIONS 1-9: 0
9. THOUGHTS THAT YOU WOULD BE BETTER OFF DEAD, OR OF HURTING YOURSELF: NOT AT ALL
6. FEELING BAD ABOUT YOURSELF - OR THAT YOU ARE A FAILURE OR HAVE LET YOURSELF OR YOUR FAMILY DOWN: NOT AT ALL
7. TROUBLE CONCENTRATING ON THINGS, SUCH AS READING THE NEWSPAPER OR WATCHING TELEVISION: NOT AT ALL
SUM OF ALL RESPONSES TO PHQ QUESTIONS 1-9: 0
10. IF YOU CHECKED OFF ANY PROBLEMS, HOW DIFFICULT HAVE THESE PROBLEMS MADE IT FOR YOU TO DO YOUR WORK, TAKE CARE OF THINGS AT HOME, OR GET ALONG WITH OTHER PEOPLE: NOT DIFFICULT AT ALL
5. POOR APPETITE OR OVEREATING: NOT AT ALL
3. TROUBLE FALLING OR STAYING ASLEEP: NOT AT ALL
SUM OF ALL RESPONSES TO PHQ QUESTIONS 1-9: 0

## 2025-04-28 ASSESSMENT — ENCOUNTER SYMPTOMS
COUGH: 0
CONSTIPATION: 0
VOMITING: 0
BLURRED VISION: 0
NAUSEA: 0
HOARSE VOICE: 0
ABDOMINAL PAIN: 0
RHINORRHEA: 0
VISUAL CHANGE: 0
HAIR LOSS: 0
WHEEZING: 0
SORE THROAT: 0
SHORTNESS OF BREATH: 0
DIARRHEA: 0

## 2025-04-28 ASSESSMENT — LIFESTYLE VARIABLES
HOW MANY STANDARD DRINKS CONTAINING ALCOHOL DO YOU HAVE ON A TYPICAL DAY: 1 OR 2
HOW OFTEN DO YOU HAVE A DRINK CONTAINING ALCOHOL: NEVER

## 2025-04-28 NOTE — PATIENT INSTRUCTIONS
your weight-loss goals.  A dietitian can help you make healthy changes in your diet.  An exercise specialist or  can help you develop a safe and effective exercise program.  A counselor or psychiatrist can help you cope with issues such as depression, anxiety, or family problems that can make it hard to focus on weight loss.  Consider joining a support group for people who are trying to lose weight. Your doctor can suggest groups in your area.  Where can you learn more?  Go to https://www.[employer].net/patientEd and enter U357 to learn more about \"Starting a Weight-Loss Plan: Care Instructions.\"  Current as of: April 30, 2024  Content Version: 14.4  © 2024-2025 [employer].   Care instructions adapted under license by [employer]. If you have questions about a medical condition or this instruction, always ask your healthcare professional. [employer], disclaims any warranty or liability for your use of this information.         Advance Directives: Care Instructions  Overview  An advance directive is a legal way to state your wishes at the end of your life. It tells your family and your doctor what to do if you can't say what you want.  There are two main types of advance directives. You can change them any time your wishes change.  Living will.  This form tells your family and your doctor your wishes about life support and other treatment. The form is also called a declaration.  Medical power of .  This form lets you name a person to make treatment decisions for you when you can't speak for yourself. This person is called a health care agent (health care proxy, health care surrogate). The form is also called a durable power of  for health care.  If you do not have an advance directive, decisions about your medical care may be made by a family member, or by a doctor or a  who doesn't know you.  It may help to think of an advance directive as a gift to the

## 2025-04-28 NOTE — PROGRESS NOTES
Discussed with the patient the current USPSTF guidelines released March 9, 2021 for screening for lung cancer.    For adults aged 50 to 80 years who have a 20 pack-year smoking history and currently smoke or have quit within the past 15 years the grade B recommendation is to:  Screen for lung cancer with low-dose computed tomography (LDCT) every year.  Stop screening once a person has not smoked for 15 years or has a health problem that limits life expectancy or the ability to have lung surgery.    The patient  reports that she quit smoking about 21 months ago. Her smoking use included cigarettes. She started smoking about 41 years ago. She has a 40 pack-year smoking history. She has never been exposed to tobacco smoke. She has never used smokeless tobacco.. Discussed with patient the risks and benefits of screening, including over-diagnosis, false positive rate, and total radiation exposure.  The patient currently exhibits no signs or symptoms suggestive of lung cancer.  Discussed with patient the importance of compliance with yearly annual lung cancer screenings and willingness to undergo diagnosis and treatment if screening scan is positive.  In addition, the patient was counseled regarding the importance of remaining smoke free and/or total smoking cessation.    Also reviewed the following if the patient has Medicare that as of February 10, 2022, Medicare only covers LDCT screening in patients aged 50-77 with at least a 20 pack-year smoking history who currently smoke or have quit in the last 15 years  
either still smoking or quit within the last 15 years. There are no signs or symptoms of lung cancer.            Objective   Vitals:    04/28/25 1605   BP: 120/80   BP Site: Left Upper Arm   Patient Position: Sitting   Pulse: 52   Resp: 18   Temp: 99.1 °F (37.3 °C)   TempSrc: Temporal   SpO2: 97%   Weight: 111.2 kg (245 lb 3.2 oz)   Height: 1.651 m (5' 5\")      Body mass index is 40.8 kg/m².      General Appearance: alert and oriented to person, place and time, well-developed and well nourished, in no acute distress, and obese  Skin: warm and dry  Head: normocephalic and atraumatic  Eyes: conjunctivae normal and sclera anicteric  ENT: oropharynx clear and moist with normal mucous membranes  Neck: neck supple and non tender without mass   Pulmonary/Chest: clear to auscultation bilaterally- no wheezes, rales or rhonchi, normal air movement, no respiratory distress  Cardiovascular: normal rate, regular rhythm, and systolic murmur present  Abdomen: soft, non-tender and no CVA tenderness  Extremities: trace + edema-  bilateral ankles  Musculoskeletal: no swollen joints  Neurologic: gait and coordination normal and speech normal          No Known Allergies  Prior to Visit Medications    Medication Sig Taking? Authorizing Provider   Blood Glucose Monitoring Suppl (TRUE METRIX METER) w/Device KIT 1 each by Does not apply route daily Yes Jose Eduardo Johnson APRN - CNP   blood glucose test strips (TRUE METRIX BLOOD GLUCOSE TEST) strip 1 each by In Vitro route daily Yes Jose Eduardo Johnson APRN - CNP   cephALEXin (KEFLEX) 500 MG capsule Take 1 capsule by mouth 2 times daily for 5 days Yes Jose Eduardo Johnson APRN - CNP   gabapentin (NEURONTIN) 300 MG capsule Take 1 capsule by mouth in the morning and at bedtime for 90 days. Yes Elham Rivero APRN - CNP   hydrOXYzine HCl (ATARAX) 25 MG tablet Take 1 tablet by mouth every 8 hours as needed for Anxiety Yes Jose Eduardo Johnson APRN - CNP   albuterol sulfate HFA (PROAIR HFA) 108 (90 Base)

## 2025-04-29 DIAGNOSIS — N39.0 ACUTE UTI: ICD-10-CM

## 2025-04-30 LAB
MICROORGANISM SPEC CULT: NO GROWTH
SERVICE CMNT-IMP: NORMAL
SPECIMEN DESCRIPTION: NORMAL

## 2025-05-27 RX ORDER — METOPROLOL SUCCINATE 50 MG/1
TABLET, EXTENDED RELEASE ORAL
Qty: 90 TABLET | Refills: 1 | Status: SHIPPED | OUTPATIENT
Start: 2025-05-27

## 2025-05-30 DIAGNOSIS — E03.1 CONGENITAL HYPOTHYROIDISM WITHOUT GOITER: ICD-10-CM

## 2025-05-30 DIAGNOSIS — E11.8 CONTROLLED TYPE 2 DIABETES MELLITUS WITH COMPLICATION, WITHOUT LONG-TERM CURRENT USE OF INSULIN (HCC): ICD-10-CM

## 2025-05-30 NOTE — TELEPHONE ENCOUNTER
Health Maintenance   Topic Date Due    Diabetic retinal exam  11/06/2013    Lung Cancer Screening &/or Counseling  05/26/2024    Breast cancer screen  05/26/2025    DEXA (modify frequency per FRAX score)  10/24/2025 (Originally 1/2/2009)    Flu vaccine (Season Ended) 10/24/2025 (Originally 8/1/2025)    Shingles vaccine (3 of 3) 04/28/2026 (Originally 10/13/2021)    Pneumococcal 50+ years Vaccine (2 of 2 - PCV) 04/28/2026 (Originally 7/8/2017)    COVID-19 Vaccine (1 - 2024-25 season) 04/28/2026 (Originally 9/1/2024)    Respiratory Syncytial Virus (RSV) Pregnant or age 60 yrs+ (1 - Risk 60-74 years 1-dose series) 04/28/2026 (Originally 1/2/2014)    Diabetic Alb to Cr ratio (uACR) test  10/22/2025    Lipids  10/22/2025    Diabetic foot exam  10/24/2025    Colorectal Cancer Screen  04/12/2026    A1C test (Diabetic or Prediabetic)  04/24/2026    GFR test (Diabetes, CKD 3-4, OR last GFR 15-59)  04/24/2026    Depression Monitoring  04/28/2026    DTaP/Tdap/Td vaccine (2 - Td or Tdap) 06/28/2031    Hepatitis B vaccine  Completed    Annual Wellness Visit (Medicare Advantage)  Completed    Hepatitis C screen  Completed    Hepatitis A vaccine  Aged Out    Hib vaccine  Aged Out    Polio vaccine  Aged Out    Meningococcal (ACWY) vaccine  Aged Out    Meningococcal B vaccine  Aged Out    Depression Screen  Discontinued             (applicable per patient's age: Cancer Screenings, Depression Screening, Fall Risk Screening, Immunizations)    Hemoglobin A1C (%)   Date Value   04/24/2025 7.3 (H)   10/24/2024 7.5   04/15/2024 6.8 (H)     AST (U/L)   Date Value   10/29/2024 18     ALT (U/L)   Date Value   10/29/2024 17     BUN (mg/dL)   Date Value   04/24/2025 13      (goal A1C is < 7)   (goal LDL is <100) need 30-50% reduction from baseline     BP Readings from Last 3 Encounters:   04/28/25 120/80   10/24/24 132/78   04/22/24 (!) 140/69    (goal /80)      All Future Testing planned in [facility]:  Lab Frequency Next Occurrence   HOLLIE

## 2025-06-02 RX ORDER — METFORMIN HYDROCHLORIDE 500 MG/1
1000 TABLET, EXTENDED RELEASE ORAL
Qty: 180 TABLET | Refills: 3 | Status: SHIPPED | OUTPATIENT
Start: 2025-06-02

## 2025-06-02 RX ORDER — LISINOPRIL 5 MG/1
5 TABLET ORAL DAILY
Qty: 90 TABLET | Refills: 3 | Status: SHIPPED | OUTPATIENT
Start: 2025-06-02

## 2025-06-02 RX ORDER — LEVOTHYROXINE SODIUM 175 UG/1
175 TABLET ORAL DAILY
Qty: 90 TABLET | Refills: 3 | Status: SHIPPED | OUTPATIENT
Start: 2025-06-02

## 2025-06-04 DIAGNOSIS — G89.29 CHRONIC NECK PAIN: ICD-10-CM

## 2025-06-04 DIAGNOSIS — M54.2 CHRONIC NECK PAIN: ICD-10-CM

## 2025-06-04 RX ORDER — GABAPENTIN 300 MG/1
CAPSULE ORAL
Qty: 60 CAPSULE | Refills: 11 | OUTPATIENT
Start: 2025-06-04

## 2025-06-25 DIAGNOSIS — M54.2 CHRONIC NECK PAIN: ICD-10-CM

## 2025-06-25 DIAGNOSIS — G89.29 CHRONIC NECK PAIN: ICD-10-CM

## 2025-06-25 RX ORDER — GABAPENTIN 300 MG/1
300 CAPSULE ORAL 2 TIMES DAILY
Qty: 60 CAPSULE | Refills: 2 | Status: SHIPPED | OUTPATIENT
Start: 2025-06-25 | End: 2025-09-23

## (undated) DEVICE — SOLUTION IV IRRIG 0.9% NACL 3000ML BAG 2B7477

## (undated) DEVICE — TUBING, SUCTION, 9/32" X 12', STRAIGHT: Brand: MEDLINE INDUSTRIES, INC.

## (undated) DEVICE — GLOVE SURG SZ 75 L12IN FNGR THK87MIL WHT LTX FREE

## (undated) DEVICE — Device

## (undated) DEVICE — BANDAGE COMPR W6INXL12FT SMOOTH FOR LIMB EXSANG ESMARCH

## (undated) DEVICE — UNDERGLOVE SURG SZ 8 BLU LTX FREE SYN POLYISOPRENE POLYMER

## (undated) DEVICE — PAIN TRAY: Brand: MEDLINE INDUSTRIES, INC.

## (undated) DEVICE — PADDING CAST W6INXL4YD COT LO LINTING WYTEX

## (undated) DEVICE — SYRINGE MED 10ML LUERLOCK TIP W/O SFTY DISP

## (undated) DEVICE — AVANOS* QUINCKE NEEDLE: Brand: AVANOS

## (undated) DEVICE — AVANOS* TUOHY EPIDURAL NEEDLE: Brand: AVANOS

## (undated) DEVICE — [AGGRESSIVE PLUS CUTTER, ARTHROSCOPIC SHAVER BLADE,  DO NOT RESTERILIZE,  DO NOT USE IF PACKAGE IS DAMAGED,  KEEP DRY,  KEEP AWAY FROM SUNLIGHT]: Brand: FORMULA

## (undated) DEVICE — HYPODERMIC SAFETY NEEDLE: Brand: MAGELLAN

## (undated) DEVICE — SOLUTION IV IRRIG POUR BRL 0.9% SODIUM CHL 2F7124

## (undated) DEVICE — DRIP REDUCTION MANIFOLD

## (undated) DEVICE — SYRINGE 20ML LL S/C 50

## (undated) DEVICE — GOWN,REINF,POLY,AURORA,XLNG/XL,STRL: Brand: MEDLINE

## (undated) DEVICE — TUBING PMP L16FT MAIN DISP FOR AR-6400 AR-6475

## (undated) DEVICE — AVANOS* UNIVERSAL BLOCK TRAYS: Brand: AVANOS

## (undated) DEVICE — PAD,ABDOMINAL,8"X10",ST,LF: Brand: MEDLINE

## (undated) DEVICE — Device: Brand: PORTEX

## (undated) DEVICE — SPONGE GZ W4XL4IN COT 12 PLY TYP VII WVN C FLD DSGN

## (undated) DEVICE — NEEDLE SPNL L3.5IN PNK HUB S STL REG WALL FIT STYL W/ QNCKE

## (undated) DEVICE — BANDAGE COMPR M W6INXL10YD WHT BGE VELC E MTRX HK AND LOOP

## (undated) DEVICE — SUTURE ETHLN SZ 3-0 L18IN NONABSORBABLE BLK L24MM PS-1 3/8 1663G

## (undated) DEVICE — SET EXTN TBNG MINIBOR 20IN

## (undated) DEVICE — PACK,ARTHROSCOPY I,SIRUS: Brand: MEDLINE

## (undated) DEVICE — BANDAGE COBAN 6 IN WND 6INX5YD FOAM